# Patient Record
Sex: MALE | Race: WHITE | Employment: OTHER | URBAN - METROPOLITAN AREA
[De-identification: names, ages, dates, MRNs, and addresses within clinical notes are randomized per-mention and may not be internally consistent; named-entity substitution may affect disease eponyms.]

---

## 2017-01-11 ENCOUNTER — ANTI-COAG VISIT (OUTPATIENT)
Dept: FAMILY MEDICINE CLINIC | Age: 77
End: 2017-01-11

## 2017-01-11 DIAGNOSIS — I48.91 ATRIAL FIBRILLATION, UNSPECIFIED TYPE (HCC): ICD-10-CM

## 2017-01-11 LAB
INR BLD: 2
PROTHROMBIN TIME: 22 SEC (ref 8.1–13.7)

## 2017-01-19 ENCOUNTER — OFFICE VISIT (OUTPATIENT)
Dept: CARDIOLOGY | Age: 77
End: 2017-01-19

## 2017-01-19 VITALS
RESPIRATION RATE: 18 BRPM | DIASTOLIC BLOOD PRESSURE: 72 MMHG | HEART RATE: 80 BPM | OXYGEN SATURATION: 94 % | SYSTOLIC BLOOD PRESSURE: 118 MMHG | WEIGHT: 315 LBS | HEIGHT: 69 IN | BODY MASS INDEX: 46.65 KG/M2

## 2017-01-19 DIAGNOSIS — I10 ESSENTIAL HYPERTENSION: ICD-10-CM

## 2017-01-19 DIAGNOSIS — I25.10 CORONARY ARTERY DISEASE INVOLVING NATIVE CORONARY ARTERY OF NATIVE HEART WITHOUT ANGINA PECTORIS: ICD-10-CM

## 2017-01-19 DIAGNOSIS — I48.91 ATRIAL FIBRILLATION, UNSPECIFIED TYPE (HCC): Primary | ICD-10-CM

## 2017-01-19 DIAGNOSIS — E78.2 MIXED HYPERLIPIDEMIA: ICD-10-CM

## 2017-01-19 DIAGNOSIS — G47.33 OSA (OBSTRUCTIVE SLEEP APNEA): Chronic | ICD-10-CM

## 2017-01-19 PROCEDURE — 93000 ELECTROCARDIOGRAM COMPLETE: CPT | Performed by: INTERNAL MEDICINE

## 2017-01-19 PROCEDURE — G8419 CALC BMI OUT NRM PARAM NOF/U: HCPCS | Performed by: INTERNAL MEDICINE

## 2017-01-19 PROCEDURE — 4040F PNEUMOC VAC/ADMIN/RCVD: CPT | Performed by: INTERNAL MEDICINE

## 2017-01-19 PROCEDURE — 1036F TOBACCO NON-USER: CPT | Performed by: INTERNAL MEDICINE

## 2017-01-19 PROCEDURE — 1123F ACP DISCUSS/DSCN MKR DOCD: CPT | Performed by: INTERNAL MEDICINE

## 2017-01-19 PROCEDURE — G8484 FLU IMMUNIZE NO ADMIN: HCPCS | Performed by: INTERNAL MEDICINE

## 2017-01-19 PROCEDURE — 99213 OFFICE O/P EST LOW 20 MIN: CPT | Performed by: INTERNAL MEDICINE

## 2017-01-19 PROCEDURE — G8598 ASA/ANTIPLAT THER USED: HCPCS | Performed by: INTERNAL MEDICINE

## 2017-01-19 PROCEDURE — G8427 DOCREV CUR MEDS BY ELIG CLIN: HCPCS | Performed by: INTERNAL MEDICINE

## 2017-01-19 RX ORDER — BRIMONIDINE TARTRATE 0.15 %
DROPS OPHTHALMIC (EYE)
COMMUNITY
Start: 2017-01-16 | End: 2022-03-22

## 2017-01-19 RX ORDER — POTASSIUM CHLORIDE 750 MG/1
10 TABLET, EXTENDED RELEASE ORAL
COMMUNITY
Start: 2016-11-05 | End: 2017-07-27 | Stop reason: SDUPTHER

## 2017-01-19 RX ORDER — LATANOPROST 50 UG/ML
SOLUTION/ DROPS OPHTHALMIC
COMMUNITY
Start: 2017-01-17

## 2017-02-14 DIAGNOSIS — I48.91 ATRIAL FIBRILLATION, UNSPECIFIED TYPE (HCC): ICD-10-CM

## 2017-02-14 LAB
INR BLD: 2.2
PROTHROMBIN TIME: 24.6 SEC (ref 8.1–13.7)

## 2017-02-15 ENCOUNTER — ANTI-COAG VISIT (OUTPATIENT)
Dept: FAMILY MEDICINE CLINIC | Age: 77
End: 2017-02-15

## 2017-02-17 ENCOUNTER — TELEPHONE (OUTPATIENT)
Dept: FAMILY MEDICINE CLINIC | Age: 77
End: 2017-02-17

## 2017-03-21 ENCOUNTER — ANTI-COAG VISIT (OUTPATIENT)
Dept: FAMILY MEDICINE CLINIC | Age: 77
End: 2017-03-21

## 2017-03-21 DIAGNOSIS — I48.91 ATRIAL FIBRILLATION, UNSPECIFIED TYPE (HCC): ICD-10-CM

## 2017-03-21 LAB
INR BLD: 2.2
PROTHROMBIN TIME: 24.7 SEC (ref 8.1–13.7)

## 2017-05-02 ENCOUNTER — ANTI-COAG VISIT (OUTPATIENT)
Dept: FAMILY MEDICINE CLINIC | Age: 77
End: 2017-05-02

## 2017-05-02 DIAGNOSIS — I48.91 ATRIAL FIBRILLATION, UNSPECIFIED TYPE (HCC): ICD-10-CM

## 2017-05-02 LAB
INR BLD: 2.3
PROTHROMBIN TIME: 24.9 SEC (ref 8.1–13.7)

## 2017-05-18 ENCOUNTER — OFFICE VISIT (OUTPATIENT)
Dept: FAMILY MEDICINE CLINIC | Age: 77
End: 2017-05-18

## 2017-05-18 VITALS
WEIGHT: 302 LBS | BODY MASS INDEX: 44.73 KG/M2 | SYSTOLIC BLOOD PRESSURE: 120 MMHG | RESPIRATION RATE: 18 BRPM | HEART RATE: 72 BPM | DIASTOLIC BLOOD PRESSURE: 58 MMHG | HEIGHT: 69 IN | TEMPERATURE: 97.7 F

## 2017-05-18 DIAGNOSIS — I48.91 ATRIAL FIBRILLATION, UNSPECIFIED TYPE (HCC): ICD-10-CM

## 2017-05-18 DIAGNOSIS — I10 ESSENTIAL HYPERTENSION: Primary | ICD-10-CM

## 2017-05-18 DIAGNOSIS — G47.33 OSA (OBSTRUCTIVE SLEEP APNEA): Chronic | ICD-10-CM

## 2017-05-18 DIAGNOSIS — E78.2 MIXED HYPERLIPIDEMIA: ICD-10-CM

## 2017-05-18 DIAGNOSIS — J44.9 CHRONIC OBSTRUCTIVE PULMONARY DISEASE, UNSPECIFIED COPD TYPE (HCC): ICD-10-CM

## 2017-05-18 DIAGNOSIS — E66.01 MORBID OBESITY WITH BMI OF 40.0-44.9, ADULT (HCC): ICD-10-CM

## 2017-05-18 PROCEDURE — G8926 SPIRO NO PERF OR DOC: HCPCS | Performed by: FAMILY MEDICINE

## 2017-05-18 PROCEDURE — 3023F SPIROM DOC REV: CPT | Performed by: FAMILY MEDICINE

## 2017-05-18 PROCEDURE — 1123F ACP DISCUSS/DSCN MKR DOCD: CPT | Performed by: FAMILY MEDICINE

## 2017-05-18 PROCEDURE — G8427 DOCREV CUR MEDS BY ELIG CLIN: HCPCS | Performed by: FAMILY MEDICINE

## 2017-05-18 PROCEDURE — G8417 CALC BMI ABV UP PARAM F/U: HCPCS | Performed by: FAMILY MEDICINE

## 2017-05-18 PROCEDURE — 1036F TOBACCO NON-USER: CPT | Performed by: FAMILY MEDICINE

## 2017-05-18 PROCEDURE — 4040F PNEUMOC VAC/ADMIN/RCVD: CPT | Performed by: FAMILY MEDICINE

## 2017-05-18 PROCEDURE — G8598 ASA/ANTIPLAT THER USED: HCPCS | Performed by: FAMILY MEDICINE

## 2017-05-18 PROCEDURE — 99214 OFFICE O/P EST MOD 30 MIN: CPT | Performed by: FAMILY MEDICINE

## 2017-05-18 RX ORDER — FLUTICASONE FUROATE AND VILANTEROL 200; 25 UG/1; UG/1
1 POWDER RESPIRATORY (INHALATION) DAILY
Qty: 90 EACH | Refills: 3 | Status: SHIPPED | OUTPATIENT
Start: 2017-05-18 | End: 2018-01-18 | Stop reason: SDUPTHER

## 2017-05-18 ASSESSMENT — ENCOUNTER SYMPTOMS
SHORTNESS OF BREATH: 0
ABDOMINAL PAIN: 0
CONSTIPATION: 0
COUGH: 0
TROUBLE SWALLOWING: 0
VOMITING: 0
DIARRHEA: 0
NAUSEA: 0
BLOOD IN STOOL: 0
CHEST TIGHTNESS: 0

## 2017-06-21 ENCOUNTER — ANTI-COAG VISIT (OUTPATIENT)
Dept: FAMILY MEDICINE CLINIC | Age: 77
End: 2017-06-21

## 2017-06-21 DIAGNOSIS — J44.9 CHRONIC OBSTRUCTIVE PULMONARY DISEASE, UNSPECIFIED COPD TYPE (HCC): ICD-10-CM

## 2017-06-21 DIAGNOSIS — I10 ESSENTIAL HYPERTENSION: ICD-10-CM

## 2017-06-21 DIAGNOSIS — E78.2 MIXED HYPERLIPIDEMIA: ICD-10-CM

## 2017-06-21 LAB
ALBUMIN SERPL-MCNC: 3.8 G/DL (ref 3.9–4.9)
ALP BLD-CCNC: 80 U/L (ref 35–104)
ALT SERPL-CCNC: 17 U/L (ref 0–41)
ANION GAP SERPL CALCULATED.3IONS-SCNC: 12 MEQ/L (ref 7–13)
AST SERPL-CCNC: 19 U/L (ref 0–40)
BASOPHILS ABSOLUTE: 0.1 K/UL (ref 0–0.2)
BASOPHILS RELATIVE PERCENT: 0.8 %
BILIRUB SERPL-MCNC: 0.4 MG/DL (ref 0–1.2)
BUN BLDV-MCNC: 15 MG/DL (ref 8–23)
CALCIUM SERPL-MCNC: 8.2 MG/DL (ref 8.6–10.2)
CHLORIDE BLD-SCNC: 105 MEQ/L (ref 98–107)
CHOLESTEROL, TOTAL: 124 MG/DL (ref 0–199)
CO2: 23 MEQ/L (ref 22–29)
CREAT SERPL-MCNC: 1.05 MG/DL (ref 0.7–1.2)
EOSINOPHILS ABSOLUTE: 0.3 K/UL (ref 0–0.7)
EOSINOPHILS RELATIVE PERCENT: 3.6 %
GFR AFRICAN AMERICAN: >60
GFR NON-AFRICAN AMERICAN: >60
GLOBULIN: 2.1 G/DL (ref 2.3–3.5)
GLUCOSE BLD-MCNC: 134 MG/DL (ref 74–109)
HCT VFR BLD CALC: 40.8 % (ref 42–52)
HDLC SERPL-MCNC: 36 MG/DL (ref 40–59)
HEMOGLOBIN: 14.1 G/DL (ref 14–18)
INR BLD: 1.4
LDL CHOLESTEROL CALCULATED: 70 MG/DL (ref 0–129)
LYMPHOCYTES ABSOLUTE: 1.4 K/UL (ref 1–4.8)
LYMPHOCYTES RELATIVE PERCENT: 20.4 %
MCH RBC QN AUTO: 33.4 PG (ref 27–31.3)
MCHC RBC AUTO-ENTMCNC: 34.6 % (ref 33–37)
MCV RBC AUTO: 96.5 FL (ref 80–100)
MONOCYTES ABSOLUTE: 0.5 K/UL (ref 0.2–0.8)
MONOCYTES RELATIVE PERCENT: 7.4 %
NEUTROPHILS ABSOLUTE: 4.8 K/UL (ref 1.4–6.5)
NEUTROPHILS RELATIVE PERCENT: 67.8 %
PDW BLD-RTO: 14.2 % (ref 11.5–14.5)
PLATELET # BLD: 176 K/UL (ref 130–400)
POTASSIUM SERPL-SCNC: 3.9 MEQ/L (ref 3.5–5.1)
PROTHROMBIN TIME: 15.5 SEC (ref 8.1–13.7)
RBC # BLD: 4.23 M/UL (ref 4.7–6.1)
SODIUM BLD-SCNC: 140 MEQ/L (ref 132–144)
TOTAL PROTEIN: 5.9 G/DL (ref 6.4–8.1)
TRIGL SERPL-MCNC: 91 MG/DL (ref 0–200)
WBC # BLD: 7.1 K/UL (ref 4.8–10.8)

## 2017-07-03 ENCOUNTER — ANTI-COAG VISIT (OUTPATIENT)
Dept: FAMILY MEDICINE CLINIC | Age: 77
End: 2017-07-03

## 2017-07-03 DIAGNOSIS — I48.91 ATRIAL FIBRILLATION, UNSPECIFIED TYPE (HCC): ICD-10-CM

## 2017-07-03 DIAGNOSIS — I48.91 ATRIAL FIBRILLATION, UNSPECIFIED TYPE (HCC): Primary | ICD-10-CM

## 2017-07-03 LAB
INR BLD: 2
PROTHROMBIN TIME: 22.1 SEC (ref 8.1–13.7)

## 2017-07-12 ENCOUNTER — OFFICE VISIT (OUTPATIENT)
Dept: FAMILY MEDICINE CLINIC | Age: 77
End: 2017-07-12

## 2017-07-12 VITALS
DIASTOLIC BLOOD PRESSURE: 62 MMHG | WEIGHT: 306 LBS | HEIGHT: 69 IN | HEART RATE: 66 BPM | SYSTOLIC BLOOD PRESSURE: 122 MMHG | TEMPERATURE: 97.4 F | RESPIRATION RATE: 24 BRPM | BODY MASS INDEX: 45.32 KG/M2

## 2017-07-12 DIAGNOSIS — I10 ESSENTIAL HYPERTENSION: Primary | ICD-10-CM

## 2017-07-12 DIAGNOSIS — I48.91 ATRIAL FIBRILLATION, UNSPECIFIED TYPE (HCC): ICD-10-CM

## 2017-07-12 DIAGNOSIS — R79.9 ABNORMAL FINDING OF BLOOD CHEMISTRY: ICD-10-CM

## 2017-07-12 DIAGNOSIS — G47.33 OSA (OBSTRUCTIVE SLEEP APNEA): Chronic | ICD-10-CM

## 2017-07-12 DIAGNOSIS — E66.01 MORBID OBESITY WITH BMI OF 40.0-44.9, ADULT (HCC): ICD-10-CM

## 2017-07-12 DIAGNOSIS — J44.9 CHRONIC OBSTRUCTIVE PULMONARY DISEASE, UNSPECIFIED COPD TYPE (HCC): ICD-10-CM

## 2017-07-12 DIAGNOSIS — E78.2 MIXED HYPERLIPIDEMIA: ICD-10-CM

## 2017-07-12 PROCEDURE — 1123F ACP DISCUSS/DSCN MKR DOCD: CPT | Performed by: FAMILY MEDICINE

## 2017-07-12 PROCEDURE — 1036F TOBACCO NON-USER: CPT | Performed by: FAMILY MEDICINE

## 2017-07-12 PROCEDURE — 99214 OFFICE O/P EST MOD 30 MIN: CPT | Performed by: FAMILY MEDICINE

## 2017-07-12 PROCEDURE — G8598 ASA/ANTIPLAT THER USED: HCPCS | Performed by: FAMILY MEDICINE

## 2017-07-12 PROCEDURE — G8926 SPIRO NO PERF OR DOC: HCPCS | Performed by: FAMILY MEDICINE

## 2017-07-12 PROCEDURE — G8427 DOCREV CUR MEDS BY ELIG CLIN: HCPCS | Performed by: FAMILY MEDICINE

## 2017-07-12 PROCEDURE — 3023F SPIROM DOC REV: CPT | Performed by: FAMILY MEDICINE

## 2017-07-12 PROCEDURE — G8417 CALC BMI ABV UP PARAM F/U: HCPCS | Performed by: FAMILY MEDICINE

## 2017-07-12 PROCEDURE — 4040F PNEUMOC VAC/ADMIN/RCVD: CPT | Performed by: FAMILY MEDICINE

## 2017-07-20 ENCOUNTER — OFFICE VISIT (OUTPATIENT)
Dept: CARDIOLOGY | Age: 77
End: 2017-07-20

## 2017-07-20 VITALS
WEIGHT: 314.2 LBS | DIASTOLIC BLOOD PRESSURE: 64 MMHG | HEART RATE: 66 BPM | OXYGEN SATURATION: 97 % | RESPIRATION RATE: 16 BRPM | BODY MASS INDEX: 46.06 KG/M2 | SYSTOLIC BLOOD PRESSURE: 136 MMHG

## 2017-07-20 DIAGNOSIS — I48.91 ATRIAL FIBRILLATION, UNSPECIFIED TYPE (HCC): Primary | ICD-10-CM

## 2017-07-20 DIAGNOSIS — I10 ESSENTIAL HYPERTENSION: ICD-10-CM

## 2017-07-20 DIAGNOSIS — E66.01 MORBID OBESITY WITH BMI OF 40.0-44.9, ADULT (HCC): ICD-10-CM

## 2017-07-20 DIAGNOSIS — G47.33 OSA (OBSTRUCTIVE SLEEP APNEA): Chronic | ICD-10-CM

## 2017-07-20 PROCEDURE — G8417 CALC BMI ABV UP PARAM F/U: HCPCS | Performed by: INTERNAL MEDICINE

## 2017-07-20 PROCEDURE — G8427 DOCREV CUR MEDS BY ELIG CLIN: HCPCS | Performed by: INTERNAL MEDICINE

## 2017-07-20 PROCEDURE — 93000 ELECTROCARDIOGRAM COMPLETE: CPT | Performed by: INTERNAL MEDICINE

## 2017-07-20 PROCEDURE — 1036F TOBACCO NON-USER: CPT | Performed by: INTERNAL MEDICINE

## 2017-07-20 PROCEDURE — 99213 OFFICE O/P EST LOW 20 MIN: CPT | Performed by: INTERNAL MEDICINE

## 2017-07-20 PROCEDURE — 4040F PNEUMOC VAC/ADMIN/RCVD: CPT | Performed by: INTERNAL MEDICINE

## 2017-07-20 PROCEDURE — G8598 ASA/ANTIPLAT THER USED: HCPCS | Performed by: INTERNAL MEDICINE

## 2017-07-20 PROCEDURE — 1123F ACP DISCUSS/DSCN MKR DOCD: CPT | Performed by: INTERNAL MEDICINE

## 2017-07-24 ENCOUNTER — ANTI-COAG VISIT (OUTPATIENT)
Dept: FAMILY MEDICINE CLINIC | Age: 77
End: 2017-07-24

## 2017-07-24 DIAGNOSIS — I48.91 ATRIAL FIBRILLATION, UNSPECIFIED TYPE (HCC): ICD-10-CM

## 2017-07-24 LAB
INR BLD: 1.9
PROTHROMBIN TIME: 21 SEC (ref 8.1–13.7)

## 2017-08-30 ENCOUNTER — ANTI-COAG VISIT (OUTPATIENT)
Dept: FAMILY MEDICINE CLINIC | Age: 77
End: 2017-08-30

## 2017-08-30 DIAGNOSIS — I48.91 ATRIAL FIBRILLATION, UNSPECIFIED TYPE (HCC): ICD-10-CM

## 2017-08-30 LAB
INR BLD: 2.6
PROTHROMBIN TIME: 27.9 SEC (ref 8.1–13.7)

## 2017-10-12 DIAGNOSIS — I48.91 ATRIAL FIBRILLATION, UNSPECIFIED TYPE (HCC): ICD-10-CM

## 2017-10-12 LAB
INR BLD: 2.4
PROTHROMBIN TIME: 25 SEC (ref 8.1–13.7)

## 2017-10-13 ENCOUNTER — ANTI-COAG VISIT (OUTPATIENT)
Dept: FAMILY MEDICINE CLINIC | Age: 77
End: 2017-10-13

## 2017-10-26 ENCOUNTER — OFFICE VISIT (OUTPATIENT)
Dept: FAMILY MEDICINE CLINIC | Age: 77
End: 2017-10-26

## 2017-10-26 VITALS
HEART RATE: 84 BPM | WEIGHT: 313 LBS | BODY MASS INDEX: 45.89 KG/M2 | DIASTOLIC BLOOD PRESSURE: 70 MMHG | RESPIRATION RATE: 24 BRPM | SYSTOLIC BLOOD PRESSURE: 126 MMHG | TEMPERATURE: 97.7 F

## 2017-10-26 DIAGNOSIS — I10 ESSENTIAL HYPERTENSION: ICD-10-CM

## 2017-10-26 DIAGNOSIS — S81.802A WOUND OF LEFT LOWER EXTREMITY, INITIAL ENCOUNTER: Primary | ICD-10-CM

## 2017-10-26 DIAGNOSIS — Z23 NEED FOR IMMUNIZATION AGAINST INFLUENZA: ICD-10-CM

## 2017-10-26 PROCEDURE — 1123F ACP DISCUSS/DSCN MKR DOCD: CPT | Performed by: FAMILY MEDICINE

## 2017-10-26 PROCEDURE — 4040F PNEUMOC VAC/ADMIN/RCVD: CPT | Performed by: FAMILY MEDICINE

## 2017-10-26 PROCEDURE — 99213 OFFICE O/P EST LOW 20 MIN: CPT | Performed by: FAMILY MEDICINE

## 2017-10-26 PROCEDURE — G8417 CALC BMI ABV UP PARAM F/U: HCPCS | Performed by: FAMILY MEDICINE

## 2017-10-26 PROCEDURE — G8598 ASA/ANTIPLAT THER USED: HCPCS | Performed by: FAMILY MEDICINE

## 2017-10-26 PROCEDURE — G8484 FLU IMMUNIZE NO ADMIN: HCPCS | Performed by: FAMILY MEDICINE

## 2017-10-26 PROCEDURE — 90662 IIV NO PRSV INCREASED AG IM: CPT | Performed by: FAMILY MEDICINE

## 2017-10-26 PROCEDURE — G0008 ADMIN INFLUENZA VIRUS VAC: HCPCS | Performed by: FAMILY MEDICINE

## 2017-10-26 PROCEDURE — G8427 DOCREV CUR MEDS BY ELIG CLIN: HCPCS | Performed by: FAMILY MEDICINE

## 2017-10-26 PROCEDURE — 1036F TOBACCO NON-USER: CPT | Performed by: FAMILY MEDICINE

## 2017-10-26 RX ORDER — TERAZOSIN 10 MG/1
10 CAPSULE ORAL NIGHTLY
Qty: 90 CAPSULE | Refills: 3 | Status: SHIPPED | OUTPATIENT
Start: 2017-10-26 | End: 2018-07-22 | Stop reason: SDUPTHER

## 2017-11-09 ENCOUNTER — OFFICE VISIT (OUTPATIENT)
Dept: FAMILY MEDICINE CLINIC | Age: 77
End: 2017-11-09

## 2017-11-09 VITALS
HEIGHT: 69 IN | DIASTOLIC BLOOD PRESSURE: 80 MMHG | RESPIRATION RATE: 16 BRPM | BODY MASS INDEX: 45.85 KG/M2 | WEIGHT: 309.6 LBS | SYSTOLIC BLOOD PRESSURE: 132 MMHG | HEART RATE: 75 BPM

## 2017-11-09 DIAGNOSIS — Z23 NEED FOR DIPHTHERIA-TETANUS-PERTUSSIS (TDAP) VACCINE, ADULT/ADOLESCENT: ICD-10-CM

## 2017-11-09 DIAGNOSIS — L97.921 SKIN ULCER OF LEFT LOWER LEG, LIMITED TO BREAKDOWN OF SKIN (HCC): Primary | ICD-10-CM

## 2017-11-09 PROCEDURE — G8484 FLU IMMUNIZE NO ADMIN: HCPCS | Performed by: FAMILY MEDICINE

## 2017-11-09 PROCEDURE — 1123F ACP DISCUSS/DSCN MKR DOCD: CPT | Performed by: FAMILY MEDICINE

## 2017-11-09 PROCEDURE — G8598 ASA/ANTIPLAT THER USED: HCPCS | Performed by: FAMILY MEDICINE

## 2017-11-09 PROCEDURE — G8417 CALC BMI ABV UP PARAM F/U: HCPCS | Performed by: FAMILY MEDICINE

## 2017-11-09 PROCEDURE — 99213 OFFICE O/P EST LOW 20 MIN: CPT | Performed by: FAMILY MEDICINE

## 2017-11-09 PROCEDURE — G8427 DOCREV CUR MEDS BY ELIG CLIN: HCPCS | Performed by: FAMILY MEDICINE

## 2017-11-09 PROCEDURE — 1036F TOBACCO NON-USER: CPT | Performed by: FAMILY MEDICINE

## 2017-11-09 PROCEDURE — 4040F PNEUMOC VAC/ADMIN/RCVD: CPT | Performed by: FAMILY MEDICINE

## 2017-11-09 ASSESSMENT — PATIENT HEALTH QUESTIONNAIRE - PHQ9
2. FEELING DOWN, DEPRESSED OR HOPELESS: 0
1. LITTLE INTEREST OR PLEASURE IN DOING THINGS: 0
SUM OF ALL RESPONSES TO PHQ QUESTIONS 1-9: 0
SUM OF ALL RESPONSES TO PHQ9 QUESTIONS 1 & 2: 0

## 2017-11-09 NOTE — PROGRESS NOTES
Chief Complaint   Patient presents with    Wound Check     2 week fo left low leg       HPI: Juanjose Aguayo is a 65 y.o. male presenting for 2 week follow-up of wound on left lower leg. It is about the same as it was 2 weeks ago and is tender enough to interfere with sleep.   this patient is due for his tetanus, diphtheria, acellular pertussis booster. EXAM:  Constitutional Blood pressure 132/80, pulse 75, resp. rate 16, height 5' 9\" (1.753 m), weight (!) 309 lb 9.6 oz (140.4 kg). .   Physical Exam   Constitutional: He is oriented to person, place, and time. He appears well-developed and well-nourished. Cardiovascular: Normal rate, regular rhythm and normal heart sounds. Pulmonary/Chest: Effort normal and breath sounds normal.   Abdominal: Rebound: This patient is due for his tetanus shot. Neurological: He is oriented to person, place, and time. Skin:   Open sore approximately 2 cm x 1 and into the fatty layer on the lateral left shin. This is surrounded by some erythematous skin although not indurated. DIAGNOSIS:   1. Skin ulcer of left lower leg, limited to breakdown of skin ([address]  Amb External Referral To Wound Clinic   2. Need for diphtheria-tetanus-pertussis (Tdap) vaccine, adult/adolescent  Tdap (ADACEL) 5-2-15.5 LF-MCG/0.5 injection     Plan for follow up: Follow up in scheduled time with blood work as ordered. Other follow up as needed.       Electronically signed by Tonja Queen, 9:38 PM 11/12/17

## 2017-11-14 DIAGNOSIS — I10 ESSENTIAL HYPERTENSION: ICD-10-CM

## 2017-11-14 DIAGNOSIS — E78.2 MIXED HYPERLIPIDEMIA: Primary | ICD-10-CM

## 2017-11-14 DIAGNOSIS — R79.9 ABNORMAL FINDING OF BLOOD CHEMISTRY: ICD-10-CM

## 2017-11-14 DIAGNOSIS — E78.2 MIXED HYPERLIPIDEMIA: ICD-10-CM

## 2017-11-14 DIAGNOSIS — I48.91 ATRIAL FIBRILLATION, UNSPECIFIED TYPE (HCC): ICD-10-CM

## 2017-11-14 LAB
ALBUMIN SERPL-MCNC: 4 G/DL (ref 3.9–4.9)
ALP BLD-CCNC: 77 U/L (ref 35–104)
ALT SERPL-CCNC: 22 U/L (ref 0–41)
ANION GAP SERPL CALCULATED.3IONS-SCNC: 18 MEQ/L (ref 7–13)
AST SERPL-CCNC: 18 U/L (ref 0–40)
BILIRUB SERPL-MCNC: 0.6 MG/DL (ref 0–1.2)
BUN BLDV-MCNC: 22 MG/DL (ref 8–23)
CALCIUM SERPL-MCNC: 9.2 MG/DL (ref 8.6–10.2)
CHLORIDE BLD-SCNC: 101 MEQ/L (ref 98–107)
CHOLESTEROL, TOTAL: 134 MG/DL (ref 0–199)
CO2: 23 MEQ/L (ref 22–29)
CREAT SERPL-MCNC: 1.09 MG/DL (ref 0.7–1.2)
GFR AFRICAN AMERICAN: >60
GFR NON-AFRICAN AMERICAN: >60
GLOBULIN: 2.5 G/DL (ref 2.3–3.5)
GLUCOSE BLD-MCNC: 111 MG/DL (ref 74–109)
HBA1C MFR BLD: 6.5 % (ref 4.8–5.9)
HDLC SERPL-MCNC: 35 MG/DL (ref 40–59)
INR BLD: 2.5
LDL CHOLESTEROL CALCULATED: 73 MG/DL (ref 0–129)
POTASSIUM SERPL-SCNC: 4.2 MEQ/L (ref 3.5–5.1)
PROTHROMBIN TIME: 26.4 SEC (ref 8.1–13.7)
SODIUM BLD-SCNC: 142 MEQ/L (ref 132–144)
TOTAL PROTEIN: 6.5 G/DL (ref 6.4–8.1)
TRIGL SERPL-MCNC: 128 MG/DL (ref 0–200)

## 2017-11-15 ENCOUNTER — ANTI-COAG VISIT (OUTPATIENT)
Dept: FAMILY MEDICINE CLINIC | Age: 77
End: 2017-11-15

## 2017-11-20 ENCOUNTER — OFFICE VISIT (OUTPATIENT)
Dept: FAMILY MEDICINE CLINIC | Age: 77
End: 2017-11-20

## 2017-11-20 VITALS
WEIGHT: 312 LBS | DIASTOLIC BLOOD PRESSURE: 78 MMHG | RESPIRATION RATE: 24 BRPM | SYSTOLIC BLOOD PRESSURE: 150 MMHG | HEART RATE: 84 BPM | TEMPERATURE: 98.4 F | BODY MASS INDEX: 46.07 KG/M2

## 2017-11-20 DIAGNOSIS — J44.9 CHRONIC OBSTRUCTIVE PULMONARY DISEASE, UNSPECIFIED COPD TYPE (HCC): ICD-10-CM

## 2017-11-20 DIAGNOSIS — I10 ESSENTIAL HYPERTENSION: ICD-10-CM

## 2017-11-20 DIAGNOSIS — L97.921 SKIN ULCER OF LEFT LOWER LEG, LIMITED TO BREAKDOWN OF SKIN (HCC): ICD-10-CM

## 2017-11-20 DIAGNOSIS — E11.9 TYPE 2 DIABETES MELLITUS WITHOUT COMPLICATION, WITHOUT LONG-TERM CURRENT USE OF INSULIN (HCC): Primary | ICD-10-CM

## 2017-11-20 DIAGNOSIS — I48.91 ATRIAL FIBRILLATION, UNSPECIFIED TYPE (HCC): ICD-10-CM

## 2017-11-20 DIAGNOSIS — E78.2 MIXED HYPERLIPIDEMIA: ICD-10-CM

## 2017-11-20 PROCEDURE — G8417 CALC BMI ABV UP PARAM F/U: HCPCS | Performed by: FAMILY MEDICINE

## 2017-11-20 PROCEDURE — 4040F PNEUMOC VAC/ADMIN/RCVD: CPT | Performed by: FAMILY MEDICINE

## 2017-11-20 PROCEDURE — G8427 DOCREV CUR MEDS BY ELIG CLIN: HCPCS | Performed by: FAMILY MEDICINE

## 2017-11-20 PROCEDURE — 1123F ACP DISCUSS/DSCN MKR DOCD: CPT | Performed by: FAMILY MEDICINE

## 2017-11-20 PROCEDURE — 3023F SPIROM DOC REV: CPT | Performed by: FAMILY MEDICINE

## 2017-11-20 PROCEDURE — G8926 SPIRO NO PERF OR DOC: HCPCS | Performed by: FAMILY MEDICINE

## 2017-11-20 PROCEDURE — G8598 ASA/ANTIPLAT THER USED: HCPCS | Performed by: FAMILY MEDICINE

## 2017-11-20 PROCEDURE — G8484 FLU IMMUNIZE NO ADMIN: HCPCS | Performed by: FAMILY MEDICINE

## 2017-11-20 PROCEDURE — 1036F TOBACCO NON-USER: CPT | Performed by: FAMILY MEDICINE

## 2017-11-20 PROCEDURE — 99214 OFFICE O/P EST MOD 30 MIN: CPT | Performed by: FAMILY MEDICINE

## 2017-11-20 NOTE — PROGRESS NOTES
Dyslipidemia and Hypertension: Jac Rodriguez presents for evaluation of lipids. Compliance with treatment thus far has been good. The patient exercises three times a week. Patient here for follow-up of elevated blood pressure. He is adherent to low salt diet. Blood pressure is well controlled at home Antihypertensive medication side effects: no medication side effects noted. Use of agents associated with hypertension: none. No new myalgias or GI upset on simvastatin (Zocor). The patient is diabetic and his hemoglobin A1c is 6.5%. This is well-controlled with current diet. At this point we do not need to add medication. Skin ulcer in the left lower leg is still present, it is shallow and it seems to be healing but not rapidly. His COPD is stable with current treatment. Lab results for chronic conditions:    GFR Non- (no units)   Date Value   11/14/2017 >60.0   06/21/2017 >60.0   11/08/2016 >60.0     GFR  (no units)   Date Value   11/14/2017 >60.0   06/21/2017 >60.0   11/08/2016 >60.0     Cholesterol, Total (mg/dL)   Date Value   11/14/2017 134   06/21/2017 124   11/08/2016 147     Triglycerides (mg/dL)   Date Value   11/14/2017 128   06/21/2017 91   11/08/2016 142     HDL (mg/dL)   Date Value   11/14/2017 35 (L)   06/21/2017 36 (L)   11/08/2016 37 (L)     LDL Calculated (mg/dL)   Date Value   11/14/2017 73   06/21/2017 70   11/08/2016 82          Past Medical History:   Diagnosis Date    Atrial fibrillation (HCC)     BPH (benign prostatic hypertrophy)     CAD (coronary artery disease)     COPD (chronic obstructive pulmonary disease) (HCC)     Hyperlipidemia     Hypertension     Insomnia     Sleep apnea     CPAP     ROS:This patient reports no polyuria, polydipsia or episodes of hypoglycemia. Medications are tolerated well with good compliance reported. This patient reports no chest pains or pressure. There is no shortness of breath or chest wall soreness.  The patient COPD type ([address]  CBC Auto Differential    Well controlled, continue current medication. if the skin wound is not healed over the next couple weeks patient is to call back and he would then need referral to wound center. Plan for follow up: Follow up in 6 months with blood work as ordered. Other follow up as needed.       Electronically signed by Chapin Yuan, 9:18 PM 11/25/17

## 2017-11-25 RX ORDER — SIMVASTATIN 20 MG
20 TABLET ORAL NIGHTLY
Qty: 90 TABLET | Refills: 3 | Status: SHIPPED | OUTPATIENT
Start: 2017-11-25 | End: 2018-07-22 | Stop reason: SDUPTHER

## 2018-01-18 ENCOUNTER — OFFICE VISIT (OUTPATIENT)
Dept: CARDIOLOGY | Age: 78
End: 2018-01-18

## 2018-01-18 ENCOUNTER — TELEPHONE (OUTPATIENT)
Dept: FAMILY MEDICINE CLINIC | Age: 78
End: 2018-01-18

## 2018-01-18 VITALS
BODY MASS INDEX: 46.65 KG/M2 | HEART RATE: 66 BPM | DIASTOLIC BLOOD PRESSURE: 80 MMHG | SYSTOLIC BLOOD PRESSURE: 130 MMHG | WEIGHT: 315 LBS | HEIGHT: 69 IN

## 2018-01-18 DIAGNOSIS — I10 ESSENTIAL HYPERTENSION: ICD-10-CM

## 2018-01-18 DIAGNOSIS — E13.8 DIABETES MELLITUS OF OTHER TYPE WITH COMPLICATION, UNSPECIFIED LONG TERM INSULIN USE STATUS: ICD-10-CM

## 2018-01-18 DIAGNOSIS — I87.2 VENOUS INSUFFICIENCY: ICD-10-CM

## 2018-01-18 DIAGNOSIS — I48.91 ATRIAL FIBRILLATION, UNSPECIFIED TYPE (HCC): Primary | ICD-10-CM

## 2018-01-18 DIAGNOSIS — L98.491 CHRONIC SKIN ULCER, LIMITED TO BREAKDOWN OF SKIN (HCC): ICD-10-CM

## 2018-01-18 DIAGNOSIS — I48.20 CHRONIC ATRIAL FIBRILLATION (HCC): ICD-10-CM

## 2018-01-18 DIAGNOSIS — E66.01 MORBID OBESITY WITH BMI OF 45.0-49.9, ADULT (HCC): ICD-10-CM

## 2018-01-18 DIAGNOSIS — J44.9 CHRONIC OBSTRUCTIVE PULMONARY DISEASE, UNSPECIFIED COPD TYPE (HCC): ICD-10-CM

## 2018-01-18 DIAGNOSIS — R60.0 BILATERAL LEG EDEMA: ICD-10-CM

## 2018-01-18 DIAGNOSIS — R60.9 PERIPHERAL EDEMA: ICD-10-CM

## 2018-01-18 PROCEDURE — 93000 ELECTROCARDIOGRAM COMPLETE: CPT | Performed by: INTERNAL MEDICINE

## 2018-01-18 PROCEDURE — 4040F PNEUMOC VAC/ADMIN/RCVD: CPT | Performed by: INTERNAL MEDICINE

## 2018-01-18 PROCEDURE — G8417 CALC BMI ABV UP PARAM F/U: HCPCS | Performed by: INTERNAL MEDICINE

## 2018-01-18 PROCEDURE — G8427 DOCREV CUR MEDS BY ELIG CLIN: HCPCS | Performed by: INTERNAL MEDICINE

## 2018-01-18 PROCEDURE — G8484 FLU IMMUNIZE NO ADMIN: HCPCS | Performed by: INTERNAL MEDICINE

## 2018-01-18 PROCEDURE — 1123F ACP DISCUSS/DSCN MKR DOCD: CPT | Performed by: INTERNAL MEDICINE

## 2018-01-18 PROCEDURE — 1036F TOBACCO NON-USER: CPT | Performed by: INTERNAL MEDICINE

## 2018-01-18 PROCEDURE — 99213 OFFICE O/P EST LOW 20 MIN: CPT | Performed by: INTERNAL MEDICINE

## 2018-01-18 PROCEDURE — G8598 ASA/ANTIPLAT THER USED: HCPCS | Performed by: INTERNAL MEDICINE

## 2018-01-18 RX ORDER — LISINOPRIL 20 MG/1
TABLET ORAL
Qty: 90 TABLET | Refills: 3 | Status: SHIPPED | OUTPATIENT
Start: 2018-01-18 | End: 2018-10-29 | Stop reason: SDUPTHER

## 2018-01-18 RX ORDER — FUROSEMIDE 40 MG/1
TABLET ORAL
Qty: 180 TABLET | Refills: 3 | Status: SHIPPED | OUTPATIENT
Start: 2018-01-18 | End: 2018-10-29 | Stop reason: SDUPTHER

## 2018-01-18 RX ORDER — FINASTERIDE 5 MG/1
TABLET, FILM COATED ORAL
Qty: 90 TABLET | Refills: 3 | Status: SHIPPED | OUTPATIENT
Start: 2018-01-18 | End: 2018-10-29 | Stop reason: SDUPTHER

## 2018-01-18 RX ORDER — POTASSIUM CHLORIDE 750 MG/1
TABLET, EXTENDED RELEASE ORAL
Qty: 90 TABLET | Refills: 3 | Status: SHIPPED | OUTPATIENT
Start: 2018-01-18 | End: 2018-10-29 | Stop reason: SDUPTHER

## 2018-01-20 RX ORDER — FLUTICASONE FUROATE AND VILANTEROL 200; 25 UG/1; UG/1
1 POWDER RESPIRATORY (INHALATION) DAILY
Qty: 90 EACH | Refills: 3 | Status: SHIPPED | OUTPATIENT
Start: 2018-01-20 | End: 2018-10-29

## 2018-01-25 ENCOUNTER — ANTI-COAG VISIT (OUTPATIENT)
Dept: FAMILY MEDICINE CLINIC | Age: 78
End: 2018-01-25

## 2018-01-25 DIAGNOSIS — I48.91 ATRIAL FIBRILLATION, UNSPECIFIED TYPE (HCC): ICD-10-CM

## 2018-01-25 LAB
INR BLD: 2.7
PROTHROMBIN TIME: 29 SEC (ref 8.1–13.7)

## 2018-02-28 ENCOUNTER — ANTI-COAG VISIT (OUTPATIENT)
Dept: FAMILY MEDICINE CLINIC | Age: 78
End: 2018-02-28

## 2018-02-28 DIAGNOSIS — I48.91 ATRIAL FIBRILLATION, UNSPECIFIED TYPE (HCC): ICD-10-CM

## 2018-02-28 LAB
INR BLD: 3
PROTHROMBIN TIME: 31.4 SEC (ref 8.1–13.7)

## 2018-04-02 ENCOUNTER — ANTI-COAG VISIT (OUTPATIENT)
Dept: FAMILY MEDICINE CLINIC | Age: 78
End: 2018-04-02

## 2018-04-02 ENCOUNTER — TELEPHONE (OUTPATIENT)
Dept: FAMILY MEDICINE CLINIC | Age: 78
End: 2018-04-02

## 2018-04-02 DIAGNOSIS — I48.91 ATRIAL FIBRILLATION, UNSPECIFIED TYPE (HCC): ICD-10-CM

## 2018-04-02 LAB
INR BLD: 2.4
PROTHROMBIN TIME: 25.8 SEC (ref 8.1–13.7)

## 2018-04-02 RX ORDER — WARFARIN SODIUM 5 MG/1
TABLET ORAL
Qty: 135 TABLET | Refills: 3 | Status: SHIPPED | OUTPATIENT
Start: 2018-04-02 | End: 2018-10-29 | Stop reason: SDUPTHER

## 2018-05-14 ENCOUNTER — ANTI-COAG VISIT (OUTPATIENT)
Dept: FAMILY MEDICINE CLINIC | Age: 78
End: 2018-05-14

## 2018-05-14 DIAGNOSIS — I48.91 ATRIAL FIBRILLATION, UNSPECIFIED TYPE (HCC): ICD-10-CM

## 2018-05-14 DIAGNOSIS — E78.2 MIXED HYPERLIPIDEMIA: ICD-10-CM

## 2018-05-14 DIAGNOSIS — J44.9 CHRONIC OBSTRUCTIVE PULMONARY DISEASE, UNSPECIFIED COPD TYPE (HCC): ICD-10-CM

## 2018-05-14 DIAGNOSIS — E11.9 TYPE 2 DIABETES MELLITUS WITHOUT COMPLICATION, WITHOUT LONG-TERM CURRENT USE OF INSULIN (HCC): ICD-10-CM

## 2018-05-14 DIAGNOSIS — I10 ESSENTIAL HYPERTENSION: ICD-10-CM

## 2018-05-14 LAB
ALBUMIN SERPL-MCNC: 4.1 G/DL (ref 3.9–4.9)
ALP BLD-CCNC: 101 U/L (ref 35–104)
ALT SERPL-CCNC: 25 U/L (ref 0–41)
ANION GAP SERPL CALCULATED.3IONS-SCNC: 19 MEQ/L (ref 7–13)
AST SERPL-CCNC: 21 U/L (ref 0–40)
BASOPHILS ABSOLUTE: 0 K/UL (ref 0–0.2)
BASOPHILS RELATIVE PERCENT: 0.6 %
BILIRUB SERPL-MCNC: 0.4 MG/DL (ref 0–1.2)
BUN BLDV-MCNC: 19 MG/DL (ref 8–23)
CALCIUM SERPL-MCNC: 8.5 MG/DL (ref 8.6–10.2)
CHLORIDE BLD-SCNC: 101 MEQ/L (ref 98–107)
CHOLESTEROL, TOTAL: 139 MG/DL (ref 0–199)
CO2: 23 MEQ/L (ref 22–29)
CREAT SERPL-MCNC: 1.07 MG/DL (ref 0.7–1.2)
EOSINOPHILS ABSOLUTE: 0.2 K/UL (ref 0–0.7)
EOSINOPHILS RELATIVE PERCENT: 2.8 %
GFR AFRICAN AMERICAN: >60
GFR NON-AFRICAN AMERICAN: >60
GLOBULIN: 2.2 G/DL (ref 2.3–3.5)
GLUCOSE BLD-MCNC: 140 MG/DL (ref 74–109)
HBA1C MFR BLD: 6.5 % (ref 4.8–5.9)
HCT VFR BLD CALC: 41.9 % (ref 42–52)
HDLC SERPL-MCNC: 34 MG/DL (ref 40–59)
HEMOGLOBIN: 14.5 G/DL (ref 14–18)
INR BLD: 2.7
LDL CHOLESTEROL CALCULATED: 80 MG/DL (ref 0–129)
LYMPHOCYTES ABSOLUTE: 1.2 K/UL (ref 1–4.8)
LYMPHOCYTES RELATIVE PERCENT: 15.7 %
MCH RBC QN AUTO: 34 PG (ref 27–31.3)
MCHC RBC AUTO-ENTMCNC: 34.7 % (ref 33–37)
MCV RBC AUTO: 97.9 FL (ref 80–100)
MONOCYTES ABSOLUTE: 0.5 K/UL (ref 0.2–0.8)
MONOCYTES RELATIVE PERCENT: 6.4 %
NEUTROPHILS ABSOLUTE: 5.6 K/UL (ref 1.4–6.5)
NEUTROPHILS RELATIVE PERCENT: 74.5 %
PDW BLD-RTO: 14.9 % (ref 11.5–14.5)
PLATELET # BLD: 196 K/UL (ref 130–400)
POTASSIUM SERPL-SCNC: 4.3 MEQ/L (ref 3.5–5.1)
PROTHROMBIN TIME: 28.8 SEC (ref 9.6–12.3)
RBC # BLD: 4.28 M/UL (ref 4.7–6.1)
SODIUM BLD-SCNC: 143 MEQ/L (ref 132–144)
TOTAL PROTEIN: 6.3 G/DL (ref 6.4–8.1)
TRIGL SERPL-MCNC: 125 MG/DL (ref 0–200)
WBC # BLD: 7.6 K/UL (ref 4.8–10.8)

## 2018-05-21 ENCOUNTER — OFFICE VISIT (OUTPATIENT)
Dept: FAMILY MEDICINE CLINIC | Age: 78
End: 2018-05-21
Payer: MEDICARE

## 2018-05-21 VITALS
BODY MASS INDEX: 46.65 KG/M2 | SYSTOLIC BLOOD PRESSURE: 132 MMHG | HEART RATE: 72 BPM | DIASTOLIC BLOOD PRESSURE: 76 MMHG | RESPIRATION RATE: 16 BRPM | OXYGEN SATURATION: 94 % | HEIGHT: 69 IN | WEIGHT: 315 LBS | TEMPERATURE: 97.7 F

## 2018-05-21 DIAGNOSIS — I10 ESSENTIAL HYPERTENSION: ICD-10-CM

## 2018-05-21 DIAGNOSIS — E78.2 MIXED HYPERLIPIDEMIA: ICD-10-CM

## 2018-05-21 DIAGNOSIS — G47.33 OSA (OBSTRUCTIVE SLEEP APNEA): Chronic | ICD-10-CM

## 2018-05-21 DIAGNOSIS — E11.9 TYPE 2 DIABETES MELLITUS WITHOUT COMPLICATION, WITHOUT LONG-TERM CURRENT USE OF INSULIN (HCC): ICD-10-CM

## 2018-05-21 DIAGNOSIS — H40.9 GLAUCOMA OF BOTH EYES, UNSPECIFIED GLAUCOMA TYPE: ICD-10-CM

## 2018-05-21 DIAGNOSIS — E11.9 TYPE 2 DIABETES MELLITUS WITHOUT COMPLICATION, WITHOUT LONG-TERM CURRENT USE OF INSULIN (HCC): Primary | ICD-10-CM

## 2018-05-21 DIAGNOSIS — I48.91 ATRIAL FIBRILLATION, UNSPECIFIED TYPE (HCC): ICD-10-CM

## 2018-05-21 DIAGNOSIS — F51.01 PRIMARY INSOMNIA: ICD-10-CM

## 2018-05-21 DIAGNOSIS — J44.9 CHRONIC OBSTRUCTIVE PULMONARY DISEASE, UNSPECIFIED COPD TYPE (HCC): ICD-10-CM

## 2018-05-21 LAB
CREATININE URINE: 74.7 MG/DL
MICROALBUMIN UR-MCNC: <1.2 MG/DL
MICROALBUMIN/CREAT UR-RTO: NORMAL MG/G (ref 0–30)

## 2018-05-21 PROCEDURE — 1036F TOBACCO NON-USER: CPT | Performed by: FAMILY MEDICINE

## 2018-05-21 PROCEDURE — 1123F ACP DISCUSS/DSCN MKR DOCD: CPT | Performed by: FAMILY MEDICINE

## 2018-05-21 PROCEDURE — G8417 CALC BMI ABV UP PARAM F/U: HCPCS | Performed by: FAMILY MEDICINE

## 2018-05-21 PROCEDURE — G8926 SPIRO NO PERF OR DOC: HCPCS | Performed by: FAMILY MEDICINE

## 2018-05-21 PROCEDURE — 4040F PNEUMOC VAC/ADMIN/RCVD: CPT | Performed by: FAMILY MEDICINE

## 2018-05-21 PROCEDURE — G8427 DOCREV CUR MEDS BY ELIG CLIN: HCPCS | Performed by: FAMILY MEDICINE

## 2018-05-21 PROCEDURE — 3023F SPIROM DOC REV: CPT | Performed by: FAMILY MEDICINE

## 2018-05-21 PROCEDURE — G8598 ASA/ANTIPLAT THER USED: HCPCS | Performed by: FAMILY MEDICINE

## 2018-05-21 PROCEDURE — 99214 OFFICE O/P EST MOD 30 MIN: CPT | Performed by: FAMILY MEDICINE

## 2018-05-21 RX ORDER — DORZOLAMIDE HCL 20 MG/ML
SOLUTION/ DROPS OPHTHALMIC
Refills: 5 | COMMUNITY
Start: 2018-05-09 | End: 2022-03-22

## 2018-05-21 RX ORDER — ZOLPIDEM TARTRATE 5 MG/1
5 TABLET ORAL NIGHTLY PRN
Qty: 30 TABLET | Refills: 2 | Status: SHIPPED | OUTPATIENT
Start: 2018-05-21 | End: 2018-10-29 | Stop reason: SDUPTHER

## 2018-05-21 ASSESSMENT — PATIENT HEALTH QUESTIONNAIRE - PHQ9
SUM OF ALL RESPONSES TO PHQ9 QUESTIONS 1 & 2: 0
SUM OF ALL RESPONSES TO PHQ QUESTIONS 1-9: 0
1. LITTLE INTEREST OR PLEASURE IN DOING THINGS: 0
2. FEELING DOWN, DEPRESSED OR HOPELESS: 0

## 2018-05-23 ASSESSMENT — ENCOUNTER SYMPTOMS
COUGH: 0
CONSTIPATION: 0
ABDOMINAL PAIN: 0
CHEST TIGHTNESS: 0
SHORTNESS OF BREATH: 0
VOMITING: 0
DIARRHEA: 0
BLOOD IN STOOL: 0
NAUSEA: 0
TROUBLE SWALLOWING: 0

## 2018-05-31 ENCOUNTER — OFFICE VISIT (OUTPATIENT)
Dept: FAMILY MEDICINE CLINIC | Age: 78
End: 2018-05-31
Payer: MEDICARE

## 2018-05-31 VITALS
BODY MASS INDEX: 47.55 KG/M2 | WEIGHT: 315 LBS | HEART RATE: 80 BPM | DIASTOLIC BLOOD PRESSURE: 70 MMHG | SYSTOLIC BLOOD PRESSURE: 120 MMHG | RESPIRATION RATE: 16 BRPM | TEMPERATURE: 98.7 F

## 2018-05-31 DIAGNOSIS — M10.9 ACUTE GOUTY ARTHRITIS: Primary | ICD-10-CM

## 2018-05-31 DIAGNOSIS — M10.272 ACUTE DRUG-INDUCED GOUT INVOLVING TOE OF LEFT FOOT: ICD-10-CM

## 2018-05-31 PROCEDURE — 4040F PNEUMOC VAC/ADMIN/RCVD: CPT | Performed by: INTERNAL MEDICINE

## 2018-05-31 PROCEDURE — G8417 CALC BMI ABV UP PARAM F/U: HCPCS | Performed by: INTERNAL MEDICINE

## 2018-05-31 PROCEDURE — 1036F TOBACCO NON-USER: CPT | Performed by: INTERNAL MEDICINE

## 2018-05-31 PROCEDURE — G8598 ASA/ANTIPLAT THER USED: HCPCS | Performed by: INTERNAL MEDICINE

## 2018-05-31 PROCEDURE — 99213 OFFICE O/P EST LOW 20 MIN: CPT | Performed by: INTERNAL MEDICINE

## 2018-05-31 PROCEDURE — 1123F ACP DISCUSS/DSCN MKR DOCD: CPT | Performed by: INTERNAL MEDICINE

## 2018-05-31 PROCEDURE — G8427 DOCREV CUR MEDS BY ELIG CLIN: HCPCS | Performed by: INTERNAL MEDICINE

## 2018-05-31 RX ORDER — COLCHICINE 0.6 MG/1
TABLET ORAL
Qty: 30 TABLET | Refills: 2 | Status: SHIPPED | OUTPATIENT
Start: 2018-05-31 | End: 2018-06-08 | Stop reason: SINTOL

## 2018-05-31 ASSESSMENT — ENCOUNTER SYMPTOMS
SHORTNESS OF BREATH: 0
VOMITING: 0
NAUSEA: 0
BACK PAIN: 0
COUGH: 0
SINUS PRESSURE: 0
RHINORRHEA: 0
SORE THROAT: 0
SINUS PAIN: 0
WHEEZING: 0
COLOR CHANGE: 1
DIARRHEA: 0

## 2018-06-04 ENCOUNTER — TELEPHONE (OUTPATIENT)
Dept: FAMILY MEDICINE CLINIC | Age: 78
End: 2018-06-04

## 2018-06-08 ENCOUNTER — OFFICE VISIT (OUTPATIENT)
Dept: FAMILY MEDICINE CLINIC | Age: 78
End: 2018-06-08
Payer: MEDICARE

## 2018-06-08 VITALS
TEMPERATURE: 97.3 F | HEART RATE: 78 BPM | BODY MASS INDEX: 46.65 KG/M2 | WEIGHT: 315 LBS | RESPIRATION RATE: 12 BRPM | DIASTOLIC BLOOD PRESSURE: 60 MMHG | HEIGHT: 69 IN | OXYGEN SATURATION: 92 % | SYSTOLIC BLOOD PRESSURE: 118 MMHG

## 2018-06-08 DIAGNOSIS — I48.91 ATRIAL FIBRILLATION, UNSPECIFIED TYPE (HCC): ICD-10-CM

## 2018-06-08 DIAGNOSIS — M10.071 ACUTE IDIOPATHIC GOUT INVOLVING TOE OF RIGHT FOOT: ICD-10-CM

## 2018-06-08 DIAGNOSIS — M10.071 ACUTE IDIOPATHIC GOUT INVOLVING TOE OF RIGHT FOOT: Primary | ICD-10-CM

## 2018-06-08 LAB
BASOPHILS ABSOLUTE: 0.1 K/UL (ref 0–0.2)
BASOPHILS RELATIVE PERCENT: 0.7 %
EOSINOPHILS ABSOLUTE: 0.2 K/UL (ref 0–0.7)
EOSINOPHILS RELATIVE PERCENT: 1.9 %
HCT VFR BLD CALC: 39.5 % (ref 42–52)
HEMOGLOBIN: 13.6 G/DL (ref 14–18)
INR BLD: 1.9
LYMPHOCYTES ABSOLUTE: 1 K/UL (ref 1–4.8)
LYMPHOCYTES RELATIVE PERCENT: 12.1 %
MCH RBC QN AUTO: 33.4 PG (ref 27–31.3)
MCHC RBC AUTO-ENTMCNC: 34.5 % (ref 33–37)
MCV RBC AUTO: 96.9 FL (ref 80–100)
MONOCYTES ABSOLUTE: 0.5 K/UL (ref 0.2–0.8)
MONOCYTES RELATIVE PERCENT: 5.7 %
NEUTROPHILS ABSOLUTE: 6.4 K/UL (ref 1.4–6.5)
NEUTROPHILS RELATIVE PERCENT: 79.6 %
PDW BLD-RTO: 14.7 % (ref 11.5–14.5)
PLATELET # BLD: 233 K/UL (ref 130–400)
PROTHROMBIN TIME: 19.8 SEC (ref 9.6–12.3)
RBC # BLD: 4.08 M/UL (ref 4.7–6.1)
URIC ACID, SERUM: 8.1 MG/DL (ref 3.4–7)
WBC # BLD: 8.1 K/UL (ref 4.8–10.8)

## 2018-06-08 PROCEDURE — 1036F TOBACCO NON-USER: CPT | Performed by: FAMILY MEDICINE

## 2018-06-08 PROCEDURE — G8417 CALC BMI ABV UP PARAM F/U: HCPCS | Performed by: FAMILY MEDICINE

## 2018-06-08 PROCEDURE — 96372 THER/PROPH/DIAG INJ SC/IM: CPT | Performed by: FAMILY MEDICINE

## 2018-06-08 PROCEDURE — G8598 ASA/ANTIPLAT THER USED: HCPCS | Performed by: FAMILY MEDICINE

## 2018-06-08 PROCEDURE — G8427 DOCREV CUR MEDS BY ELIG CLIN: HCPCS | Performed by: FAMILY MEDICINE

## 2018-06-08 PROCEDURE — 1123F ACP DISCUSS/DSCN MKR DOCD: CPT | Performed by: FAMILY MEDICINE

## 2018-06-08 PROCEDURE — 4040F PNEUMOC VAC/ADMIN/RCVD: CPT | Performed by: FAMILY MEDICINE

## 2018-06-08 PROCEDURE — 99213 OFFICE O/P EST LOW 20 MIN: CPT | Performed by: FAMILY MEDICINE

## 2018-06-08 RX ORDER — METHYLPREDNISOLONE ACETATE 80 MG/ML
80 INJECTION, SUSPENSION INTRA-ARTICULAR; INTRALESIONAL; INTRAMUSCULAR; SOFT TISSUE ONCE
Status: COMPLETED | OUTPATIENT
Start: 2018-06-08 | End: 2018-06-08

## 2018-06-08 RX ADMIN — METHYLPREDNISOLONE ACETATE 80 MG: 80 INJECTION, SUSPENSION INTRA-ARTICULAR; INTRALESIONAL; INTRAMUSCULAR; SOFT TISSUE at 08:37

## 2018-06-08 ASSESSMENT — PATIENT HEALTH QUESTIONNAIRE - PHQ9
SUM OF ALL RESPONSES TO PHQ QUESTIONS 1-9: 0
1. LITTLE INTEREST OR PLEASURE IN DOING THINGS: 0
SUM OF ALL RESPONSES TO PHQ9 QUESTIONS 1 & 2: 0
2. FEELING DOWN, DEPRESSED OR HOPELESS: 0

## 2018-06-11 ENCOUNTER — ANTI-COAG VISIT (OUTPATIENT)
Dept: FAMILY MEDICINE CLINIC | Age: 78
End: 2018-06-11
Payer: MEDICARE

## 2018-06-11 DIAGNOSIS — I48.91 ATRIAL FIBRILLATION, UNSPECIFIED TYPE (HCC): ICD-10-CM

## 2018-06-11 PROCEDURE — 93793 ANTICOAG MGMT PT WARFARIN: CPT | Performed by: FAMILY MEDICINE

## 2018-07-02 ENCOUNTER — HOSPITAL ENCOUNTER (OUTPATIENT)
Dept: NON INVASIVE DIAGNOSTICS | Age: 78
Discharge: HOME | End: 2018-07-02
Payer: MEDICARE

## 2018-07-02 DIAGNOSIS — R60.0 BILATERAL LEG EDEMA: ICD-10-CM

## 2018-07-02 DIAGNOSIS — I48.91 ATRIAL FIBRILLATION, UNSPECIFIED TYPE (HCC): ICD-10-CM

## 2018-07-02 LAB
LV EF: 50 %
LVEF MODALITY: NORMAL

## 2018-07-02 PROCEDURE — 93306 TTE W/DOPPLER COMPLETE: CPT

## 2018-07-17 ENCOUNTER — OFFICE VISIT (OUTPATIENT)
Dept: CARDIOLOGY CLINIC | Age: 78
End: 2018-07-17
Payer: MEDICARE

## 2018-07-17 VITALS
RESPIRATION RATE: 16 BRPM | DIASTOLIC BLOOD PRESSURE: 68 MMHG | SYSTOLIC BLOOD PRESSURE: 124 MMHG | HEART RATE: 82 BPM | BODY MASS INDEX: 44.77 KG/M2 | WEIGHT: 303.2 LBS | OXYGEN SATURATION: 96 %

## 2018-07-17 DIAGNOSIS — I10 ESSENTIAL HYPERTENSION: ICD-10-CM

## 2018-07-17 DIAGNOSIS — I48.91 ATRIAL FIBRILLATION, UNSPECIFIED TYPE (HCC): Primary | ICD-10-CM

## 2018-07-17 DIAGNOSIS — I25.10 CORONARY ARTERY DISEASE INVOLVING NATIVE CORONARY ARTERY OF NATIVE HEART WITHOUT ANGINA PECTORIS: ICD-10-CM

## 2018-07-17 DIAGNOSIS — G47.33 OSA (OBSTRUCTIVE SLEEP APNEA): Chronic | ICD-10-CM

## 2018-07-17 DIAGNOSIS — E66.01 MORBID OBESITY WITH BMI OF 40.0-44.9, ADULT (HCC): ICD-10-CM

## 2018-07-17 DIAGNOSIS — E78.2 MIXED HYPERLIPIDEMIA: ICD-10-CM

## 2018-07-17 DIAGNOSIS — I48.91 ATRIAL FIBRILLATION, UNSPECIFIED TYPE (HCC): ICD-10-CM

## 2018-07-17 LAB
INR BLD: 2.9
PROTHROMBIN TIME: 30.8 SEC (ref 9.6–12.3)

## 2018-07-17 PROCEDURE — G8598 ASA/ANTIPLAT THER USED: HCPCS | Performed by: INTERNAL MEDICINE

## 2018-07-17 PROCEDURE — 99214 OFFICE O/P EST MOD 30 MIN: CPT | Performed by: INTERNAL MEDICINE

## 2018-07-17 PROCEDURE — G8417 CALC BMI ABV UP PARAM F/U: HCPCS | Performed by: INTERNAL MEDICINE

## 2018-07-17 PROCEDURE — 4040F PNEUMOC VAC/ADMIN/RCVD: CPT | Performed by: INTERNAL MEDICINE

## 2018-07-17 PROCEDURE — 93000 ELECTROCARDIOGRAM COMPLETE: CPT | Performed by: INTERNAL MEDICINE

## 2018-07-17 PROCEDURE — 1123F ACP DISCUSS/DSCN MKR DOCD: CPT | Performed by: INTERNAL MEDICINE

## 2018-07-17 PROCEDURE — 1036F TOBACCO NON-USER: CPT | Performed by: INTERNAL MEDICINE

## 2018-07-17 PROCEDURE — 1101F PT FALLS ASSESS-DOCD LE1/YR: CPT | Performed by: INTERNAL MEDICINE

## 2018-07-17 PROCEDURE — G8427 DOCREV CUR MEDS BY ELIG CLIN: HCPCS | Performed by: INTERNAL MEDICINE

## 2018-07-17 RX ORDER — FLUTICASONE FUROATE AND VILANTEROL 200; 25 UG/1; UG/1
POWDER RESPIRATORY (INHALATION)
COMMUNITY
End: 2019-03-12 | Stop reason: ALTCHOICE

## 2018-07-17 NOTE — PROGRESS NOTES
nausea, vomiting, diarrhea, constipation, motor weakness, insomnia, weight loss, syncope, dizziness, lightheadedness, palpitations, PND, orthopnea, or claudication. No bleeding issues on coumadin. 7-31-14: as above, having back issues still. Pt denies chest pain, dyspnea, dyspnea on exertion, change in exercise capacity, fatigue,  nausea, vomiting, diarrhea, constipation, motor weakness, insomnia, weight loss, syncope, dizziness, lightheadedness, palpitations, PND, orthopnea, or claudication. No hosp. No bleeding issues. Compliant with meds. Does not have CPAP, was told he did not need CPAP by Dr. Roel Curry.    1-29-15: as above, still having back pain issues. Was told he doesn't need CPAP machine by DR. Torres. Pt denies chest pain, dyspnea, dyspnea on exertion, change in exercise capacity, fatigue,  nausea, vomiting, diarrhea, constipation, motor weakness, insomnia, weight loss, syncope, dizziness, lightheadedness, palpitations, PND, orthopnea, or claudication.' no recent hospitalizations. No bleeding issues. S/p back surgery with DR. Sana Galicia    10-13-15: as above, doing well ovearall. No change in meds. No hospitalizations. Needs to follow up with Dr. Roel Curry for CPAP/DUARTE. On coumadin, no bleeding issues. Pt denies chest pain, dyspnea, dyspnea on exertion, change in exercise capacity, fatigue,  nausea, vomiting, diarrhea, constipation, motor weakness, insomnia, weight loss, syncope, dizziness, lightheadedness, palpitations, PND, orthopnea, or claudication. Having a hard time losing weight. 11-3-15: as above, s/p ECho with EF of 50%, mild LVH, moderate biatrial enlargement, normal RVSP. Compliant with med. No recent hosptializations. BP is under control. 6-16-16: as above, doing welll overall. Did have some URI type symptoms. No hospitalizations. Remains on coumadin and no bleeding issues. Has not been able to obtain CPAP yet from Dr. Roel Curry. Will need to follow up. Diet and exercise could be better.  Pt denies chest pain, dyspnea, dyspnea on exertion, change in exercise capacity, fatigue,  nausea, vomiting, diarrhea, constipation, motor weakness, insomnia, weight loss, syncope, dizziness, lightheadedness, palpitations, PND, orthopnea,. BP and HR are good. Lipid profile is good. 1-19-17: Pt denies chest pain, dyspnea, dyspnea on exertion, change in exercise capacity, fatigue,  nausea, vomiting, diarrhea, constipation, motor weakness, insomnia, weight loss, syncope, dizziness, lightheadedness, palpitations, PND, orthopnea, or claudication. No nitro use. BP and hr are good. CAD is stable. No LE discoloration or ulcers. No LE edema. No CHF type symptoms. Lipid profile is normal.  DUARTE is under control. No issues with Afibb at this time, no bleeding issues on coumadin. 7-20-17: Pt denies chest pain, dyspnea, dyspnea on exertion, change in exercise capacity, fatigue,  nausea, vomiting, diarrhea, constipation, motor weakness, insomnia, weight loss, syncope, dizziness, lightheadedness, palpitations, PND, orthopnea, or claudication. No nitro use. BP and hr are good. CAD is stable. No LE discoloration or ulcers. No LE edema. No CHF type symptoms. Lipid profile is normal.  No bleeding issues on [address]. Compliant with meds. No hosp. Compliant with CPAP machine. 1-18-18:  States he had left LE ulvers/wounds that have now healed. Was following with [facility] dr Troy Yang. 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 S/p LE venous dupplex US which was negative for DVT and + for right GSV reflux. Remain on coumadin. afibb is under control. Pt denies chest pain, dyspnea, dyspnea on exertion, change in exercise capacity, fatigue,  nausea, vomiting, diarrhea, constipation, motor weakness, insomnia, weight loss, syncope, dizziness, lightheadedness, palpitations, PND, orthopnea. Does have LE edema b/l. Compliant with CPAP machine.        7-17-18: s/p ECHO with EF of 50%, mild LVH, mild RVE, RVSP of 16mmHg, mod MG tablet TAKE ONE TABLET BY MOUTH ONCE DAILY 90 tablet 3    potassium chloride (KLOR-CON M10) 10 MEQ extended release tablet TAKE ONE TABLET BY MOUTH ONCE DAILY 90 tablet 3    simvastatin (ZOCOR) 20 MG tablet Take 1 tablet by mouth nightly 90 tablet 3    terazosin (HYTRIN) 10 MG capsule Take 1 capsule by mouth nightly 90 capsule 3    silver sulfADIAZINE (SILVADENE) 1 % cream Apply topically daily. 50 g 1    CPAP Machine MISC by Does not apply route      brimonidine (ALPHAGAN P) 0.15 % ophthalmic solution       latanoprost (XALATAN) 0.005 % ophthalmic solution       ALPHAGAN P 0.1 % SOLN       levothyroxine (LEVOTHROID) 50 MCG tablet Take 1 tablet by mouth daily. 30 tablet 3    Vitamin D (CHOLECALCIFEROL) 1000 UNITS CAPS capsule Take 1,000 Units by mouth. Every friday       Brimonidine Tartrate (ALPHAGAN P OP) Apply 3 micro curie to eye 2 times daily.  DORZOLAMIDE HCL OP Apply 10 micro curie to eye 2 times daily.  LATANOPROST OP Apply 2.5 micro curie to eye nightly.  Fluticasone Furoate-Vilanterol (BREO ELLIPTA) 200-25 MCG/INH AEPB breo ellipta 200-25 mcg/inh aepb       No current facility-administered medications for this visit. ALLERGIES: Colcrys [colchicine]    Review of Systems:  General ROS: negative  Psychological ROS: negative  Hematological and Lymphatic ROS: No history of blood clots or bleeding disorder. Respiratory ROS: no cough, shortness of breath, or wheezing  Cardiovascular ROS: no chest pain or dyspnea on exertion  Gastrointestinal ROS: no abdominal pain, change in bowel habits, or black or bloody stools  Genito-Urinary ROS: no dysuria, trouble voiding, or hematuria  Musculoskeletal ROS: negative  Neurological ROS: no TIA or stroke symptoms  Dermatological ROS: negative    VITALS:  Blood pressure 124/68, pulse 82, resp. rate 16, weight (!) 303 lb 3.2 oz (137.5 kg), SpO2 96 %. Body mass index is 44.77 kg/m².     Physical Examination:  General appearance - Mixed hyperlipidemia     5. Morbid obesity with BMI of 40.0-44.9, adult ([address]     6. DUARTE (obstructive sleep apnea)           PLAN:     Patient will need to continue to follow up with you for their general medical care and return to see me in the office in 6 months    As always, aggressive risk factor modification is strongly recommended. We should adhere to the [address] guidelines for HTN management and the NCEP ATP III guidelines for LDL-C management. The nature of cardiac risk has been fully discussed with this patient. I have made him aware of his LDL target goal given his cardiovascular risk analysis. I have discussed the appropriate diet. The need for lifelong compliance in order to reduce risk is stressed. A regular exercise program is recommended to help achieve and maintain normal body weight, fitness and improve lipid balance. Continue medications at current doses. Check EKG    Diet and exrecise. Cont with [facility]. Echo prior to next office visit. CPAP qhs. Patient was advised and encouraged to check blood pressure at home or at a pharmacy, maintain a logbook, and also call us back if blood pressure are above the target ranges or if it is low. Patient clearly understands and agrees to the instructions. We will need to continue to monitor muscle and liver enzymes, BUN, CR, and electrolytes. Details of medical condition explained and patient was warned about adverse consequences of uncontrolled medical conditions and possible side effects of prescribed medications. Patient was advised to go to the ER if he starts experiencing adverse effects of the medications. patient was instructed to call us back or go to nearby emergency room immediately if symptoms get worse or do not improve. Thank you for allowing me to participate in the care of your patient, please don't hesitate to contact me if you have any further questions.

## 2018-07-18 ENCOUNTER — ANTI-COAG VISIT (OUTPATIENT)
Dept: FAMILY MEDICINE CLINIC | Age: 78
End: 2018-07-18

## 2018-07-20 ENCOUNTER — TELEPHONE (OUTPATIENT)
Dept: FAMILY MEDICINE CLINIC | Age: 78
End: 2018-07-20

## 2018-07-25 ENCOUNTER — CARE COORDINATION (OUTPATIENT)
Dept: CARE COORDINATION | Age: 78
End: 2018-07-25

## 2018-07-26 ENCOUNTER — CARE COORDINATION (OUTPATIENT)
Dept: CARE COORDINATION | Age: 78
End: 2018-07-26

## 2018-07-26 ENCOUNTER — OFFICE VISIT (OUTPATIENT)
Dept: FAMILY MEDICINE CLINIC | Age: 78
End: 2018-07-26
Payer: MEDICARE

## 2018-07-26 VITALS
BODY MASS INDEX: 45.38 KG/M2 | DIASTOLIC BLOOD PRESSURE: 62 MMHG | WEIGHT: 306.4 LBS | RESPIRATION RATE: 12 BRPM | HEART RATE: 63 BPM | HEIGHT: 69 IN | TEMPERATURE: 97.2 F | SYSTOLIC BLOOD PRESSURE: 114 MMHG | OXYGEN SATURATION: 96 %

## 2018-07-26 DIAGNOSIS — I10 ESSENTIAL HYPERTENSION: ICD-10-CM

## 2018-07-26 DIAGNOSIS — E78.2 MIXED HYPERLIPIDEMIA: ICD-10-CM

## 2018-07-26 DIAGNOSIS — E11.9 TYPE 2 DIABETES MELLITUS WITHOUT COMPLICATION, WITHOUT LONG-TERM CURRENT USE OF INSULIN (HCC): ICD-10-CM

## 2018-07-26 DIAGNOSIS — M10.071 ACUTE IDIOPATHIC GOUT INVOLVING TOE OF RIGHT FOOT: Primary | ICD-10-CM

## 2018-07-26 PROBLEM — L97.921 SKIN ULCER OF LEFT LOWER LEG, LIMITED TO BREAKDOWN OF SKIN (HCC): Status: RESOLVED | Noted: 2017-11-09 | Resolved: 2018-07-26

## 2018-07-26 PROCEDURE — 4040F PNEUMOC VAC/ADMIN/RCVD: CPT | Performed by: FAMILY MEDICINE

## 2018-07-26 PROCEDURE — 1036F TOBACCO NON-USER: CPT | Performed by: FAMILY MEDICINE

## 2018-07-26 PROCEDURE — 1123F ACP DISCUSS/DSCN MKR DOCD: CPT | Performed by: FAMILY MEDICINE

## 2018-07-26 PROCEDURE — 99214 OFFICE O/P EST MOD 30 MIN: CPT | Performed by: FAMILY MEDICINE

## 2018-07-26 PROCEDURE — G8417 CALC BMI ABV UP PARAM F/U: HCPCS | Performed by: FAMILY MEDICINE

## 2018-07-26 PROCEDURE — 1101F PT FALLS ASSESS-DOCD LE1/YR: CPT | Performed by: FAMILY MEDICINE

## 2018-07-26 PROCEDURE — G8427 DOCREV CUR MEDS BY ELIG CLIN: HCPCS | Performed by: FAMILY MEDICINE

## 2018-07-26 PROCEDURE — 96372 THER/PROPH/DIAG INJ SC/IM: CPT | Performed by: FAMILY MEDICINE

## 2018-07-26 PROCEDURE — G8598 ASA/ANTIPLAT THER USED: HCPCS | Performed by: FAMILY MEDICINE

## 2018-07-26 RX ORDER — METHYLPREDNISOLONE ACETATE 80 MG/ML
80 INJECTION, SUSPENSION INTRA-ARTICULAR; INTRALESIONAL; INTRAMUSCULAR; SOFT TISSUE ONCE
Status: COMPLETED | OUTPATIENT
Start: 2018-07-26 | End: 2018-07-26

## 2018-07-26 RX ORDER — INDOMETHACIN 25 MG/1
CAPSULE ORAL
Refills: 0 | COMMUNITY
Start: 2018-07-21 | End: 2018-10-29

## 2018-07-26 RX ORDER — ALLOPURINOL 300 MG/1
300 TABLET ORAL DAILY
Qty: 90 TABLET | Refills: 3 | Status: SHIPPED | OUTPATIENT
Start: 2018-07-26

## 2018-07-26 RX ADMIN — METHYLPREDNISOLONE ACETATE 80 MG: 80 INJECTION, SUSPENSION INTRA-ARTICULAR; INTRALESIONAL; INTRAMUSCULAR; SOFT TISSUE at 09:10

## 2018-08-31 ENCOUNTER — OFFICE VISIT (OUTPATIENT)
Dept: FAMILY MEDICINE CLINIC | Age: 78
End: 2018-08-31

## 2018-08-31 VITALS
OXYGEN SATURATION: 97 % | BODY MASS INDEX: 43.18 KG/M2 | DIASTOLIC BLOOD PRESSURE: 78 MMHG | TEMPERATURE: 96.5 F | WEIGHT: 301.6 LBS | RESPIRATION RATE: 14 BRPM | SYSTOLIC BLOOD PRESSURE: 118 MMHG | HEIGHT: 70 IN | HEART RATE: 77 BPM

## 2018-08-31 DIAGNOSIS — M79.672 FOOT PAIN, LEFT: Primary | ICD-10-CM

## 2018-08-31 DIAGNOSIS — I48.91 ATRIAL FIBRILLATION, UNSPECIFIED TYPE (HCC): ICD-10-CM

## 2018-08-31 LAB
INR BLD: 3.4
PROTHROMBIN TIME: 36.1 SEC (ref 9.6–12.3)

## 2018-08-31 PROCEDURE — 99999 PR OFFICE/OUTPT VISIT,PROCEDURE ONLY: CPT | Performed by: NURSE PRACTITIONER

## 2018-09-04 ENCOUNTER — ANTI-COAG VISIT (OUTPATIENT)
Dept: FAMILY MEDICINE CLINIC | Age: 78
End: 2018-09-04
Payer: MEDICARE

## 2018-09-04 DIAGNOSIS — Z79.01 WARFARIN ANTICOAGULATION: ICD-10-CM

## 2018-09-04 DIAGNOSIS — I48.91 ATRIAL FIBRILLATION, UNSPECIFIED TYPE (HCC): ICD-10-CM

## 2018-09-04 PROCEDURE — 93793 ANTICOAG MGMT PT WARFARIN: CPT | Performed by: FAMILY MEDICINE

## 2018-09-10 ENCOUNTER — CARE COORDINATION (OUTPATIENT)
Dept: CARE COORDINATION | Age: 78
End: 2018-09-10

## 2018-09-10 ENCOUNTER — OFFICE VISIT (OUTPATIENT)
Dept: FAMILY MEDICINE CLINIC | Age: 78
End: 2018-09-10
Payer: MEDICARE

## 2018-09-10 VITALS
HEART RATE: 78 BPM | WEIGHT: 306.6 LBS | OXYGEN SATURATION: 95 % | SYSTOLIC BLOOD PRESSURE: 118 MMHG | HEIGHT: 70 IN | RESPIRATION RATE: 12 BRPM | DIASTOLIC BLOOD PRESSURE: 60 MMHG | BODY MASS INDEX: 43.89 KG/M2 | TEMPERATURE: 97.2 F

## 2018-09-10 DIAGNOSIS — L03.116 CELLULITIS OF LEFT FOOT: Primary | ICD-10-CM

## 2018-09-10 PROCEDURE — G8417 CALC BMI ABV UP PARAM F/U: HCPCS | Performed by: FAMILY MEDICINE

## 2018-09-10 PROCEDURE — G8598 ASA/ANTIPLAT THER USED: HCPCS | Performed by: FAMILY MEDICINE

## 2018-09-10 PROCEDURE — 1036F TOBACCO NON-USER: CPT | Performed by: FAMILY MEDICINE

## 2018-09-10 PROCEDURE — 1101F PT FALLS ASSESS-DOCD LE1/YR: CPT | Performed by: FAMILY MEDICINE

## 2018-09-10 PROCEDURE — 99213 OFFICE O/P EST LOW 20 MIN: CPT | Performed by: FAMILY MEDICINE

## 2018-09-10 PROCEDURE — G8427 DOCREV CUR MEDS BY ELIG CLIN: HCPCS | Performed by: FAMILY MEDICINE

## 2018-09-10 PROCEDURE — 1123F ACP DISCUSS/DSCN MKR DOCD: CPT | Performed by: FAMILY MEDICINE

## 2018-09-10 PROCEDURE — 4040F PNEUMOC VAC/ADMIN/RCVD: CPT | Performed by: FAMILY MEDICINE

## 2018-09-10 RX ORDER — CEPHALEXIN 500 MG/1
500 CAPSULE ORAL 3 TIMES DAILY
Qty: 30 CAPSULE | Refills: 0 | Status: SHIPPED | OUTPATIENT
Start: 2018-09-10 | End: 2018-09-20

## 2018-09-10 ASSESSMENT — PATIENT HEALTH QUESTIONNAIRE - PHQ9
SUM OF ALL RESPONSES TO PHQ QUESTIONS 1-9: 0
1. LITTLE INTEREST OR PLEASURE IN DOING THINGS: 0
SUM OF ALL RESPONSES TO PHQ QUESTIONS 1-9: 0
2. FEELING DOWN, DEPRESSED OR HOPELESS: 0
SUM OF ALL RESPONSES TO PHQ9 QUESTIONS 1 & 2: 0

## 2018-09-10 NOTE — PATIENT INSTRUCTIONS
Patient Education        Cellulitis: Care Instructions  Your Care Instructions    Cellulitis is a skin infection caused by bacteria, most often strep or staph. It often occurs after a break in the skin from a scrape, cut, bite, or puncture, or after a rash. Cellulitis may be treated without doing tests to find out what caused it. But your doctor may do tests, if needed, to look for a specific bacteria, like methicillin-resistant Staphylococcus aureus (MRSA). The doctor has checked you carefully, but problems can develop later. If you notice any problems or new symptoms, get medical treatment right away. Follow-up care is a key part of your treatment and safety. Be sure to make and go to all appointments, and call your doctor if you are having problems. It's also a good idea to know your test results and keep a list of the medicines you take. How can you care for yourself at home? · Take your antibiotics as directed. Do not stop taking them just because you feel better. You need to take the full course of antibiotics. · Prop up the infected area on pillows to reduce pain and swelling. Try to keep the area above the level of your heart as often as you can. · If your doctor told you how to care for your wound, follow your doctor's instructions. If you did not get instructions, follow this general advice:  ¨ Wash the wound with clean water 2 times a day. Don't use hydrogen peroxide or alcohol, which can slow healing. ¨ You may cover the wound with a thin layer of petroleum jelly, such as Vaseline, and a nonstick bandage. ¨ Apply more petroleum jelly and replace the bandage as needed. · Be safe with medicines. Take pain medicines exactly as directed. ¨ If the doctor gave you a prescription medicine for pain, take it as prescribed. ¨ If you are not taking a prescription pain medicine, ask your doctor if you can take an over-the-counter medicine.   To prevent cellulitis in the future  · Try to prevent cuts, scrapes, or other injuries to your skin. Cellulitis most often occurs where there is a break in the skin. · If you get a scrape, cut, mild burn, or bite, wash the wound with clean water as soon as you can to help avoid infection. Don't use hydrogen peroxide or alcohol, which can slow healing. · If you have swelling in your legs (edema), support stockings and good skin care may help prevent leg sores and cellulitis. · Take care of your feet, especially if you have diabetes or other conditions that increase the risk of infection. Wear shoes and socks. Do not go barefoot. If you have athlete's foot or other skin problems on your feet, talk to your doctor about how to treat them. When should you call for help? Call your doctor now or seek immediate medical care if:    · You have signs that your infection is getting worse, such as:  ¨ Increased pain, swelling, warmth, or redness. ¨ Red streaks leading from the area. ¨ Pus draining from the area. ¨ A fever.     · You get a rash.    Watch closely for changes in your health, and be sure to contact your doctor if:    · You do not get better as expected. Where can you learn more? Go to [email or web address]://[employer]. org and sign in to your [employer] account. Enter X667 in the [facility] box to learn more about \"Cellulitis: Care Instructions. \"     If you do not have an account, please click on the \"Sign Up Now\" link. Current as of: May 10, 2017  Content Version: 11.7  © 5323-5287 [employer]. Care instructions adapted under license by [facility] ([facility]). If you have questions about a medical condition or this instruction, always ask your healthcare professional. [place] [address] any warranty or liability for your use of this information.

## 2018-09-10 NOTE — CARE COORDINATION
Ambulatory Care Coordination Note  9/10/2018  CM Risk Score: 3  Bianca Mortality Risk Score: 14.63    ACC: Jackie Hernandez RN    Summary Note: Patient was seen in office today. Patient was seen for LT FOOT/LEG CELLULITIS. Patient was placed on antibiotics by PCP. ACC RECOMMENDED TO PATIENT IF S/S ARE NOT GETTING BETTER IN A FEW DAYS, HE SHOULD CALL OFFICE FOR ADDITIONAL EVAL FROM PCP. ACC reviewed diabetic education which included self-management diabetic ZONES, medication compliance. Patient was encouraged to comply with ADA diet and exercise. The patient denied, CP, SOB, recent falls. Patient does have bilateral lower edema. Jose Enrique received counseling on the following healthy behaviors: SELF MANAGEMENT of chronic health conditions,with goal to improve quality of life and overall wellbeing. The patient is asked to make an attempt to improve diet and exercise patterns to aid in medical management. Patient given educational materials on Diabetes/ CELLULITIS/.     I have instructed Anna Streeter to complete a self tracking handout on Blood Sugars  and Weights and instructed them to bring it with them to his next appointment. Discussed use, benefit, and side effects of prescribed medications. Barriers to medication compliance addressed. All patient questions answered. Pt voiced understanding. Diabetes Assessment    Medic Alert ID:  No  Meal Planning:  Avoidance of concentrated sweets   How often do you test your blood sugar?:  Daily   Do you have barriers with adherence to non-pharmacologic self-management interventions?  (Nutrition/Exercise/Self-Monitoring):  Yes   Have you ever had to go to the ED for symptoms of low blood sugar?:  No       Do you have hyperglycemia symptoms?:  No   Do you have hypoglycemia symptoms?:  No   Last Blood Sugar Value:  168   Blood Sugar Monitoring Regimen:  Morning Fasting   Blood Sugar Trends:  No Change        Lab Results   Component Value Date    LABA1C 6.5 (H) 05/14/2018 LABA1C 6.5 (H) 11/14/2017     Lab Results   Component Value Date    LABMICR <1.20 05/21/2018    [address] 05/14/2018    CREATININE 1.07 08/31/2018         Ambulatory Care Coordination Assessment    Care Coordination Protocol  Referral from Primary Care Provider:  No  Week 1 - Initial Assessment     Do you have all of your prescriptions and are they filled?:  Yes  Barriers to medication adherence:  None  Are you able to afford your medications?:  Yes  How often do you have trouble taking your medications the way you have been told to take them?:  I always take them as prescribed. Do you have Home O2 Therapy?:  No      Ability to seek help/take action for Emergent Urgent situations i.e. fire, crime, inclement weather or health crisis. :  Independent  Ability to ambulate to restroom:  Independent  Ability handle personal hygeine needs (bathing/dressing/grooming): Independent  Ability to manage Medications: Independent  Ability to prepare Food Preparation:  Independent  Ability to maintain home (clean home, laundry): Independent  Ability to drive and/or has transportation:  Independent  Ability to do shopping:  Independent  Ability to manage finances:   Independent  Is patient able to live independently?:  Yes     Current Housing:  Private Residence        Per the Fall Risk Screening, did the patient have 2 or more falls or 1 fall with injury in the past year?:  No     Frequent urination at night?:  No  Do you use rails/bars?:  Yes  Do you have a non-slip tub mat?:  Yes     Are you experiencing loss of meaning?:  No  Are you experiencing loss of hope and peace?:  No     Thinking about your patient's physical health needs, are there any symptoms or problems (risk indicators) you are unsure about that require further investigation?:  No identified areas of uncertainly or problems already being investigated   Are the patients physical health problems impacting on their mental well-being?:  No identified areas of concern   Are there any problems with your patients lifestyle behaviors (alcohol, drugs, diet, exercise) that are impacting on physical or mental well-being?:  No identified areas of concern   Do you have any other concerns about your patients mental well-being? How would you rate their severity and impact on the patient?:  No identified areas of concern   How would you rate their home environment in terms of safety and stability (including domestic violence, insecure housing, neighbor harassment)?:  Consistently safe, supportive, stable, no identified problems   How do daily activities impact on the patient's well-being? (include current or anticipated unemployment, work, caregiving, access to transportation or other):  Some general dissatisfaction but no concern   How would you rate their social network (family, work, friends)?:  Adequate participation with social networks   How would you rate their financial resources (including ability to afford all required medical care)?:  Financially secure, some resource challenges   How wells does the patient now understand their health and well-being (symptoms, signs or risk factors) and what they need to do to manage their health?:  Reasonable to good understanding but do not feel able to engage with advice at this time   How well do you think your patient can engage in healthcare discussions? (Barriers include language, deafness, aphasia, alcohol or drug problems, learning difficulties, concentration): Adequate communication, with or without minor barriers   Do other services need to be involved to help this patient?:  Other care/services in place and adequate   Are current services involved with this patient well-coordinated? (Include coordination with other services you are now recommendation): All required care/services in place and well-coordinated   Suggested Interventions and Community Resources   Zone Management Tools:   In Process         Schedule an

## 2018-09-10 NOTE — PROGRESS NOTES
Chief Complaint   Patient presents with    Foot Swelling     HPI: Oma Meadows is a 77 y.o. male presenting for follow-up of left foot edema and pain. The skin was red and warm plus felt like a hard surface under the skin. He may have been bitten by something. He states it hurts to walk on it because the pain. He was seen in the ER where no therapy was added. EXAM:  Constitutional Blood pressure 118/60, pulse 78, temperature 97.2 °F (36.2 °C), temperature source Temporal, resp. rate 12, height 5' 10\" (1.778 m), weight (!) 306 lb 9.6 oz (139.1 kg), SpO2 95 %. Physical Exam   Constitutional: He is oriented to person, place, and time. He appears well-developed and well-nourished. Cardiovascular: Normal rate, regular rhythm and normal heart sounds. Pulmonary/Chest: Effort normal and breath sounds normal.   Musculoskeletal:   The left foot is swollen with swelling going up to just below the knee. The skin of the dorsum of the left foot is erythematous. Pulses are palpable posterior tibial and dorsalis pedis. Neurological: He is oriented to person, place, and time. DIAGNOSIS:    Diagnosis Orders   1. Cellulitis of left foot  cephALEXin (KEFLEX) 500 MG capsule    Symptomatic, treat. The patient is also asked to continue on the diuretics. Plan for follow up: Follow up in scheduled time with blood work as ordered. Other follow up as needed.       Electronically signed by Victor Hugo Navarro, 2:24 PM 9/10/18

## 2018-10-22 DIAGNOSIS — I10 ESSENTIAL HYPERTENSION: ICD-10-CM

## 2018-10-22 DIAGNOSIS — E78.2 MIXED HYPERLIPIDEMIA: ICD-10-CM

## 2018-10-22 DIAGNOSIS — E11.9 TYPE 2 DIABETES MELLITUS WITHOUT COMPLICATION, WITHOUT LONG-TERM CURRENT USE OF INSULIN (HCC): ICD-10-CM

## 2018-10-22 LAB
ALBUMIN SERPL-MCNC: 4.1 G/DL (ref 3.9–4.9)
ALP BLD-CCNC: 80 U/L (ref 35–104)
ALT SERPL-CCNC: 19 U/L (ref 0–41)
ANION GAP SERPL CALCULATED.3IONS-SCNC: 18 MEQ/L (ref 7–13)
AST SERPL-CCNC: 15 U/L (ref 0–40)
BILIRUB SERPL-MCNC: 0.5 MG/DL (ref 0–1.2)
BUN BLDV-MCNC: 11 MG/DL (ref 8–23)
CALCIUM SERPL-MCNC: 8.9 MG/DL (ref 8.6–10.2)
CHLORIDE BLD-SCNC: 105 MEQ/L (ref 98–107)
CHOLESTEROL, TOTAL: 106 MG/DL (ref 0–199)
CO2: 24 MEQ/L (ref 22–29)
CREAT SERPL-MCNC: 0.98 MG/DL (ref 0.7–1.2)
GFR AFRICAN AMERICAN: >60
GFR NON-AFRICAN AMERICAN: >60
GLOBULIN: 2.3 G/DL (ref 2.3–3.5)
GLUCOSE BLD-MCNC: 147 MG/DL (ref 74–109)
HBA1C MFR BLD: 5.8 % (ref 4.8–5.9)
HDLC SERPL-MCNC: 37 MG/DL (ref 40–59)
LDL CHOLESTEROL CALCULATED: 49 MG/DL (ref 0–129)
POTASSIUM SERPL-SCNC: 3.9 MEQ/L (ref 3.5–5.1)
SODIUM BLD-SCNC: 147 MEQ/L (ref 132–144)
TOTAL PROTEIN: 6.4 G/DL (ref 6.4–8.1)
TRIGL SERPL-MCNC: 100 MG/DL (ref 0–200)

## 2018-10-25 DIAGNOSIS — E11.9 TYPE 2 DIABETES MELLITUS WITHOUT COMPLICATION, WITHOUT LONG-TERM CURRENT USE OF INSULIN (HCC): ICD-10-CM

## 2018-10-25 LAB
CREATININE URINE: 87.9 MG/DL
MICROALBUMIN UR-MCNC: <1.2 MG/DL
MICROALBUMIN/CREAT UR-RTO: NORMAL MG/G (ref 0–30)

## 2018-10-29 ENCOUNTER — OFFICE VISIT (OUTPATIENT)
Dept: FAMILY MEDICINE CLINIC | Age: 78
End: 2018-10-29
Payer: MEDICARE

## 2018-10-29 VITALS
OXYGEN SATURATION: 94 % | DIASTOLIC BLOOD PRESSURE: 64 MMHG | BODY MASS INDEX: 44.07 KG/M2 | TEMPERATURE: 97.4 F | RESPIRATION RATE: 16 BRPM | HEART RATE: 75 BPM | WEIGHT: 307.8 LBS | SYSTOLIC BLOOD PRESSURE: 124 MMHG | HEIGHT: 70 IN

## 2018-10-29 DIAGNOSIS — R60.9 PERIPHERAL EDEMA: ICD-10-CM

## 2018-10-29 DIAGNOSIS — F51.01 PRIMARY INSOMNIA: ICD-10-CM

## 2018-10-29 DIAGNOSIS — Z79.01 WARFARIN ANTICOAGULATION: ICD-10-CM

## 2018-10-29 DIAGNOSIS — I48.91 ATRIAL FIBRILLATION, UNSPECIFIED TYPE (HCC): ICD-10-CM

## 2018-10-29 DIAGNOSIS — Z23 NEED FOR INFLUENZA VACCINATION: ICD-10-CM

## 2018-10-29 DIAGNOSIS — J44.9 CHRONIC OBSTRUCTIVE PULMONARY DISEASE, UNSPECIFIED COPD TYPE (HCC): ICD-10-CM

## 2018-10-29 DIAGNOSIS — E78.2 MIXED HYPERLIPIDEMIA: ICD-10-CM

## 2018-10-29 DIAGNOSIS — E11.9 TYPE 2 DIABETES MELLITUS WITHOUT COMPLICATION, WITHOUT LONG-TERM CURRENT USE OF INSULIN (HCC): ICD-10-CM

## 2018-10-29 DIAGNOSIS — I10 ESSENTIAL HYPERTENSION: Primary | ICD-10-CM

## 2018-10-29 DIAGNOSIS — I10 ESSENTIAL HYPERTENSION: ICD-10-CM

## 2018-10-29 DIAGNOSIS — M10.071 ACUTE IDIOPATHIC GOUT OF RIGHT FOOT: ICD-10-CM

## 2018-10-29 LAB
INR BLD: 1.8
PROTHROMBIN TIME: 19.4 SEC (ref 9.6–12.3)

## 2018-10-29 PROCEDURE — 4040F PNEUMOC VAC/ADMIN/RCVD: CPT | Performed by: FAMILY MEDICINE

## 2018-10-29 PROCEDURE — G8482 FLU IMMUNIZE ORDER/ADMIN: HCPCS | Performed by: FAMILY MEDICINE

## 2018-10-29 PROCEDURE — 1036F TOBACCO NON-USER: CPT | Performed by: FAMILY MEDICINE

## 2018-10-29 PROCEDURE — G0008 ADMIN INFLUENZA VIRUS VAC: HCPCS | Performed by: FAMILY MEDICINE

## 2018-10-29 PROCEDURE — G8427 DOCREV CUR MEDS BY ELIG CLIN: HCPCS | Performed by: FAMILY MEDICINE

## 2018-10-29 PROCEDURE — G8598 ASA/ANTIPLAT THER USED: HCPCS | Performed by: FAMILY MEDICINE

## 2018-10-29 PROCEDURE — 99214 OFFICE O/P EST MOD 30 MIN: CPT | Performed by: FAMILY MEDICINE

## 2018-10-29 PROCEDURE — 1101F PT FALLS ASSESS-DOCD LE1/YR: CPT | Performed by: FAMILY MEDICINE

## 2018-10-29 PROCEDURE — G8417 CALC BMI ABV UP PARAM F/U: HCPCS | Performed by: FAMILY MEDICINE

## 2018-10-29 PROCEDURE — G8926 SPIRO NO PERF OR DOC: HCPCS | Performed by: FAMILY MEDICINE

## 2018-10-29 PROCEDURE — 90662 IIV NO PRSV INCREASED AG IM: CPT | Performed by: FAMILY MEDICINE

## 2018-10-29 PROCEDURE — 3023F SPIROM DOC REV: CPT | Performed by: FAMILY MEDICINE

## 2018-10-29 PROCEDURE — 1123F ACP DISCUSS/DSCN MKR DOCD: CPT | Performed by: FAMILY MEDICINE

## 2018-10-29 RX ORDER — ZOLPIDEM TARTRATE 5 MG/1
5 TABLET ORAL NIGHTLY PRN
Qty: 30 TABLET | Refills: 2 | Status: SHIPPED | OUTPATIENT
Start: 2018-10-29 | End: 2019-01-27

## 2018-10-29 ASSESSMENT — PATIENT HEALTH QUESTIONNAIRE - PHQ9
SUM OF ALL RESPONSES TO PHQ QUESTIONS 1-9: 0
SUM OF ALL RESPONSES TO PHQ9 QUESTIONS 1 & 2: 0
SUM OF ALL RESPONSES TO PHQ QUESTIONS 1-9: 0
2. FEELING DOWN, DEPRESSED OR HOPELESS: 0
1. LITTLE INTEREST OR PLEASURE IN DOING THINGS: 0

## 2018-10-29 NOTE — PROGRESS NOTES
YRS +, IM, PF, PREFILL SYR, 0.5ML (FLUZONE HD)   6. Chronic obstructive pulmonary disease, unspecified COPD type ([address]      Well controlled, continue current treatment. 7. Mixed hyperlipidemia  Lipid Panel      Monitor. 8. Primary insomnia  zolpidem (AMBIEN) 5 MG tablet    Symptomatic, treat. 9. Warfarin anticoagulation  Protime-INR      Check pro time. 10. Acute idiopathic gout of right foot  Uric Acid      Check uric acid level. Plan for follow up: Follow up in 3 months with blood work as ordered. Otherfollow up as needed. Patient will make arrangements for PT/INR depending on results.     Electronically signed by Gertrude Damon, 8:03 PM 11/2/18

## 2018-10-30 ENCOUNTER — ANTI-COAG VISIT (OUTPATIENT)
Dept: FAMILY MEDICINE CLINIC | Age: 78
End: 2018-10-30
Payer: MEDICARE

## 2018-10-30 DIAGNOSIS — Z79.01 WARFARIN ANTICOAGULATION: ICD-10-CM

## 2018-10-30 DIAGNOSIS — I48.91 ATRIAL FIBRILLATION, UNSPECIFIED TYPE (HCC): ICD-10-CM

## 2018-10-30 PROCEDURE — 93793 ANTICOAG MGMT PT WARFARIN: CPT | Performed by: FAMILY MEDICINE

## 2018-10-30 RX ORDER — POTASSIUM CHLORIDE 750 MG/1
TABLET, EXTENDED RELEASE ORAL
Qty: 90 TABLET | Refills: 3 | Status: SHIPPED | OUTPATIENT
Start: 2018-10-30 | End: 2019-10-22 | Stop reason: SDUPTHER

## 2018-10-30 RX ORDER — LISINOPRIL 20 MG/1
TABLET ORAL
Qty: 90 TABLET | Refills: 3 | Status: SHIPPED | OUTPATIENT
Start: 2018-10-30 | End: 2019-10-22 | Stop reason: SDUPTHER

## 2018-10-30 RX ORDER — FINASTERIDE 5 MG/1
TABLET, FILM COATED ORAL
Qty: 90 TABLET | Refills: 3 | Status: SHIPPED | OUTPATIENT
Start: 2018-10-30 | End: 2019-10-22 | Stop reason: SDUPTHER

## 2018-10-30 RX ORDER — FUROSEMIDE 40 MG/1
TABLET ORAL
Qty: 180 TABLET | Refills: 3 | Status: SHIPPED | OUTPATIENT
Start: 2018-10-30 | End: 2019-10-22 | Stop reason: SDUPTHER

## 2018-11-02 PROBLEM — M10.071 ACUTE IDIOPATHIC GOUT OF RIGHT FOOT: Status: ACTIVE | Noted: 2018-11-02

## 2018-11-02 ASSESSMENT — ENCOUNTER SYMPTOMS
SHORTNESS OF BREATH: 0
NAUSEA: 0
DIARRHEA: 0
BLOOD IN STOOL: 0
CHEST TIGHTNESS: 0
ABDOMINAL PAIN: 0
VOMITING: 0
COUGH: 0
CONSTIPATION: 0

## 2018-11-16 ENCOUNTER — CARE COORDINATION (OUTPATIENT)
Dept: CARE COORDINATION | Age: 78
End: 2018-11-16

## 2018-11-20 ENCOUNTER — OFFICE VISIT (OUTPATIENT)
Dept: INTERVENTIONAL RADIOLOGY/VASCULAR | Age: 78
End: 2018-11-20
Payer: MEDICARE

## 2018-11-20 VITALS
OXYGEN SATURATION: 96 % | HEART RATE: 71 BPM | BODY MASS INDEX: 44.19 KG/M2 | RESPIRATION RATE: 14 BRPM | WEIGHT: 308 LBS | DIASTOLIC BLOOD PRESSURE: 60 MMHG | SYSTOLIC BLOOD PRESSURE: 122 MMHG

## 2018-11-20 DIAGNOSIS — I87.2 EDEMA OF RIGHT LOWER LEG DUE TO PERIPHERAL VENOUS INSUFFICIENCY: Primary | ICD-10-CM

## 2018-11-20 DIAGNOSIS — I89.0 LYMPHEDEMA OF BOTH LOWER EXTREMITIES: ICD-10-CM

## 2018-11-20 DIAGNOSIS — R60.0 EDEMA OF RIGHT LOWER LEG DUE TO PERIPHERAL VENOUS INSUFFICIENCY: Primary | ICD-10-CM

## 2018-11-20 PROCEDURE — G8417 CALC BMI ABV UP PARAM F/U: HCPCS | Performed by: NURSE PRACTITIONER

## 2018-11-20 PROCEDURE — 1101F PT FALLS ASSESS-DOCD LE1/YR: CPT | Performed by: NURSE PRACTITIONER

## 2018-11-20 PROCEDURE — G8482 FLU IMMUNIZE ORDER/ADMIN: HCPCS | Performed by: NURSE PRACTITIONER

## 2018-11-20 PROCEDURE — 99205 OFFICE O/P NEW HI 60 MIN: CPT | Performed by: NURSE PRACTITIONER

## 2018-11-20 PROCEDURE — G8428 CUR MEDS NOT DOCUMENT: HCPCS | Performed by: NURSE PRACTITIONER

## 2018-11-20 ASSESSMENT — ENCOUNTER SYMPTOMS
VOMITING: 0
BACK PAIN: 1
EYE PAIN: 0
EYE REDNESS: 0
COUGH: 0
WHEEZING: 0
ABDOMINAL PAIN: 0
NAUSEA: 0
EYE ITCHING: 1
SHORTNESS OF BREATH: 1
ALLERGIC/IMMUNOLOGIC NEGATIVE: 1
SORE THROAT: 0
CONSTIPATION: 0
SINUS PAIN: 0
DIARRHEA: 0
SINUS PRESSURE: 0

## 2018-11-20 NOTE — CARE COORDINATION
Acc left detailed message regarding nature of call and requested return call.  Contact information supplied

## 2018-11-25 ENCOUNTER — CARE COORDINATION (OUTPATIENT)
Dept: CARE COORDINATION | Age: 78
End: 2018-11-25

## 2018-12-06 ENCOUNTER — ANTI-COAG VISIT (OUTPATIENT)
Dept: FAMILY MEDICINE CLINIC | Age: 78
End: 2018-12-06
Payer: MEDICARE

## 2018-12-06 DIAGNOSIS — Z79.01 WARFARIN ANTICOAGULATION: ICD-10-CM

## 2018-12-06 DIAGNOSIS — I48.91 ATRIAL FIBRILLATION, UNSPECIFIED TYPE (HCC): ICD-10-CM

## 2018-12-06 LAB
INR BLD: 2.9
PROTHROMBIN TIME: 28.4 SEC (ref 9–11.5)

## 2018-12-06 PROCEDURE — 93793 ANTICOAG MGMT PT WARFARIN: CPT | Performed by: FAMILY MEDICINE

## 2019-01-07 ENCOUNTER — ANTI-COAG VISIT (OUTPATIENT)
Dept: FAMILY MEDICINE CLINIC | Age: 79
End: 2019-01-07
Payer: MEDICARE

## 2019-01-07 DIAGNOSIS — Z79.01 WARFARIN ANTICOAGULATION: ICD-10-CM

## 2019-01-07 DIAGNOSIS — I48.91 ATRIAL FIBRILLATION, UNSPECIFIED TYPE (HCC): ICD-10-CM

## 2019-01-07 LAB
INR BLD: 2.4
PROTHROMBIN TIME: 23.4 SEC (ref 9–11.5)

## 2019-01-07 PROCEDURE — 93793 ANTICOAG MGMT PT WARFARIN: CPT | Performed by: FAMILY MEDICINE

## 2019-01-09 ENCOUNTER — TELEPHONE (OUTPATIENT)
Dept: FAMILY MEDICINE CLINIC | Age: 79
End: 2019-01-09

## 2019-01-15 ENCOUNTER — OFFICE VISIT (OUTPATIENT)
Dept: CARDIOLOGY CLINIC | Age: 79
End: 2019-01-15
Payer: MEDICARE

## 2019-01-15 ENCOUNTER — CARE COORDINATION (OUTPATIENT)
Dept: CARE COORDINATION | Age: 79
End: 2019-01-15

## 2019-01-15 VITALS
HEART RATE: 71 BPM | BODY MASS INDEX: 43.86 KG/M2 | OXYGEN SATURATION: 98 % | RESPIRATION RATE: 22 BRPM | WEIGHT: 306.4 LBS | HEIGHT: 70 IN | SYSTOLIC BLOOD PRESSURE: 126 MMHG | DIASTOLIC BLOOD PRESSURE: 80 MMHG

## 2019-01-15 DIAGNOSIS — I25.10 CORONARY ARTERY DISEASE INVOLVING NATIVE CORONARY ARTERY OF NATIVE HEART WITHOUT ANGINA PECTORIS: ICD-10-CM

## 2019-01-15 DIAGNOSIS — E78.2 MIXED HYPERLIPIDEMIA: ICD-10-CM

## 2019-01-15 DIAGNOSIS — I48.91 ATRIAL FIBRILLATION, UNSPECIFIED TYPE (HCC): Primary | ICD-10-CM

## 2019-01-15 DIAGNOSIS — I10 ESSENTIAL HYPERTENSION: ICD-10-CM

## 2019-01-15 DIAGNOSIS — E66.01 MORBID OBESITY WITH BMI OF 40.0-44.9, ADULT (HCC): ICD-10-CM

## 2019-01-15 DIAGNOSIS — G47.33 OSA (OBSTRUCTIVE SLEEP APNEA): Chronic | ICD-10-CM

## 2019-01-15 PROCEDURE — G8482 FLU IMMUNIZE ORDER/ADMIN: HCPCS | Performed by: INTERNAL MEDICINE

## 2019-01-15 PROCEDURE — 1101F PT FALLS ASSESS-DOCD LE1/YR: CPT | Performed by: INTERNAL MEDICINE

## 2019-01-15 PROCEDURE — G8598 ASA/ANTIPLAT THER USED: HCPCS | Performed by: INTERNAL MEDICINE

## 2019-01-15 PROCEDURE — 93000 ELECTROCARDIOGRAM COMPLETE: CPT | Performed by: INTERNAL MEDICINE

## 2019-01-15 PROCEDURE — 4040F PNEUMOC VAC/ADMIN/RCVD: CPT | Performed by: INTERNAL MEDICINE

## 2019-01-15 PROCEDURE — 99214 OFFICE O/P EST MOD 30 MIN: CPT | Performed by: INTERNAL MEDICINE

## 2019-01-15 PROCEDURE — 1036F TOBACCO NON-USER: CPT | Performed by: INTERNAL MEDICINE

## 2019-01-15 PROCEDURE — G8427 DOCREV CUR MEDS BY ELIG CLIN: HCPCS | Performed by: INTERNAL MEDICINE

## 2019-01-15 PROCEDURE — G8417 CALC BMI ABV UP PARAM F/U: HCPCS | Performed by: INTERNAL MEDICINE

## 2019-01-15 PROCEDURE — 1123F ACP DISCUSS/DSCN MKR DOCD: CPT | Performed by: INTERNAL MEDICINE

## 2019-01-22 ENCOUNTER — PROCEDURE VISIT (OUTPATIENT)
Dept: INTERVENTIONAL RADIOLOGY/VASCULAR | Age: 79
End: 2019-01-22
Payer: MEDICARE

## 2019-01-22 VITALS
DIASTOLIC BLOOD PRESSURE: 81 MMHG | HEART RATE: 70 BPM | RESPIRATION RATE: 14 BRPM | SYSTOLIC BLOOD PRESSURE: 161 MMHG | OXYGEN SATURATION: 95 %

## 2019-01-22 DIAGNOSIS — I83.893 VARICOSE VEINS OF BILATERAL LOWER EXTREMITIES WITH OTHER COMPLICATIONS: Primary | ICD-10-CM

## 2019-01-22 DIAGNOSIS — R60.0 EDEMA OF RIGHT LOWER LEG DUE TO PERIPHERAL VENOUS INSUFFICIENCY: ICD-10-CM

## 2019-01-22 DIAGNOSIS — I87.2 EDEMA OF RIGHT LOWER LEG DUE TO PERIPHERAL VENOUS INSUFFICIENCY: ICD-10-CM

## 2019-01-22 PROCEDURE — 36475 ENDOVENOUS RF 1ST VEIN: CPT | Performed by: RADIOLOGY

## 2019-01-29 ENCOUNTER — OFFICE VISIT (OUTPATIENT)
Dept: FAMILY MEDICINE CLINIC | Age: 79
End: 2019-01-29
Payer: MEDICARE

## 2019-01-29 VITALS
WEIGHT: 302.8 LBS | SYSTOLIC BLOOD PRESSURE: 130 MMHG | RESPIRATION RATE: 14 BRPM | DIASTOLIC BLOOD PRESSURE: 62 MMHG | HEIGHT: 70 IN | OXYGEN SATURATION: 95 % | HEART RATE: 84 BPM | TEMPERATURE: 97.9 F | BODY MASS INDEX: 43.35 KG/M2

## 2019-01-29 DIAGNOSIS — J44.9 CHRONIC OBSTRUCTIVE PULMONARY DISEASE, UNSPECIFIED COPD TYPE (HCC): Primary | ICD-10-CM

## 2019-01-29 PROCEDURE — 1123F ACP DISCUSS/DSCN MKR DOCD: CPT | Performed by: FAMILY MEDICINE

## 2019-01-29 PROCEDURE — G8427 DOCREV CUR MEDS BY ELIG CLIN: HCPCS | Performed by: FAMILY MEDICINE

## 2019-01-29 PROCEDURE — 99213 OFFICE O/P EST LOW 20 MIN: CPT | Performed by: FAMILY MEDICINE

## 2019-01-29 PROCEDURE — G8482 FLU IMMUNIZE ORDER/ADMIN: HCPCS | Performed by: FAMILY MEDICINE

## 2019-01-29 PROCEDURE — 3023F SPIROM DOC REV: CPT | Performed by: FAMILY MEDICINE

## 2019-01-29 PROCEDURE — G8598 ASA/ANTIPLAT THER USED: HCPCS | Performed by: FAMILY MEDICINE

## 2019-01-29 PROCEDURE — G8926 SPIRO NO PERF OR DOC: HCPCS | Performed by: FAMILY MEDICINE

## 2019-01-29 PROCEDURE — 1036F TOBACCO NON-USER: CPT | Performed by: FAMILY MEDICINE

## 2019-01-29 PROCEDURE — 1101F PT FALLS ASSESS-DOCD LE1/YR: CPT | Performed by: FAMILY MEDICINE

## 2019-01-29 PROCEDURE — G8417 CALC BMI ABV UP PARAM F/U: HCPCS | Performed by: FAMILY MEDICINE

## 2019-01-29 PROCEDURE — 4040F PNEUMOC VAC/ADMIN/RCVD: CPT | Performed by: FAMILY MEDICINE

## 2019-01-29 ASSESSMENT — PATIENT HEALTH QUESTIONNAIRE - PHQ9
SUM OF ALL RESPONSES TO PHQ QUESTIONS 1-9: 0
SUM OF ALL RESPONSES TO PHQ9 QUESTIONS 1 & 2: 0
1. LITTLE INTEREST OR PLEASURE IN DOING THINGS: 0
2. FEELING DOWN, DEPRESSED OR HOPELESS: 0
SUM OF ALL RESPONSES TO PHQ QUESTIONS 1-9: 0

## 2019-02-04 ENCOUNTER — OFFICE VISIT (OUTPATIENT)
Dept: INTERVENTIONAL RADIOLOGY/VASCULAR | Age: 79
End: 2019-02-04
Payer: MEDICARE

## 2019-02-04 VITALS
DIASTOLIC BLOOD PRESSURE: 72 MMHG | SYSTOLIC BLOOD PRESSURE: 120 MMHG | RESPIRATION RATE: 14 BRPM | OXYGEN SATURATION: 94 % | HEART RATE: 76 BPM

## 2019-02-04 DIAGNOSIS — I87.2 EDEMA OF RIGHT LOWER LEG DUE TO PERIPHERAL VENOUS INSUFFICIENCY: Primary | ICD-10-CM

## 2019-02-04 DIAGNOSIS — R60.0 EDEMA OF RIGHT LOWER LEG DUE TO PERIPHERAL VENOUS INSUFFICIENCY: Primary | ICD-10-CM

## 2019-02-04 DIAGNOSIS — I89.0 LYMPHEDEMA OF BOTH LOWER EXTREMITIES: ICD-10-CM

## 2019-02-04 PROCEDURE — 99214 OFFICE O/P EST MOD 30 MIN: CPT | Performed by: NURSE PRACTITIONER

## 2019-02-04 PROCEDURE — 1036F TOBACCO NON-USER: CPT | Performed by: NURSE PRACTITIONER

## 2019-02-04 PROCEDURE — G8598 ASA/ANTIPLAT THER USED: HCPCS | Performed by: NURSE PRACTITIONER

## 2019-02-04 PROCEDURE — G8482 FLU IMMUNIZE ORDER/ADMIN: HCPCS | Performed by: NURSE PRACTITIONER

## 2019-02-04 PROCEDURE — 1101F PT FALLS ASSESS-DOCD LE1/YR: CPT | Performed by: NURSE PRACTITIONER

## 2019-02-04 PROCEDURE — G8428 CUR MEDS NOT DOCUMENT: HCPCS | Performed by: NURSE PRACTITIONER

## 2019-02-04 PROCEDURE — G8417 CALC BMI ABV UP PARAM F/U: HCPCS | Performed by: NURSE PRACTITIONER

## 2019-02-04 PROCEDURE — 4040F PNEUMOC VAC/ADMIN/RCVD: CPT | Performed by: NURSE PRACTITIONER

## 2019-02-04 PROCEDURE — 1123F ACP DISCUSS/DSCN MKR DOCD: CPT | Performed by: NURSE PRACTITIONER

## 2019-02-04 ASSESSMENT — ENCOUNTER SYMPTOMS
BACK PAIN: 1
VOMITING: 0
SORE THROAT: 0
CONSTIPATION: 0
ABDOMINAL PAIN: 0
WHEEZING: 0
EYE PAIN: 0
EYE ITCHING: 0
SHORTNESS OF BREATH: 1
SINUS PAIN: 0
NAUSEA: 0
ALLERGIC/IMMUNOLOGIC NEGATIVE: 1
EYE REDNESS: 0
DIARRHEA: 0
SINUS PRESSURE: 0
COUGH: 0

## 2019-02-14 DIAGNOSIS — Z79.01 WARFARIN ANTICOAGULATION: ICD-10-CM

## 2019-02-14 DIAGNOSIS — I48.91 ATRIAL FIBRILLATION, UNSPECIFIED TYPE (HCC): ICD-10-CM

## 2019-02-14 LAB
INR BLD: 2.6
PROTHROMBIN TIME: 25.7 SEC (ref 9–11.5)

## 2019-02-15 ENCOUNTER — ANTI-COAG VISIT (OUTPATIENT)
Dept: FAMILY MEDICINE CLINIC | Age: 79
End: 2019-02-15
Payer: MEDICARE

## 2019-02-15 DIAGNOSIS — Z79.01 WARFARIN ANTICOAGULATION: ICD-10-CM

## 2019-02-15 DIAGNOSIS — I48.91 ATRIAL FIBRILLATION, UNSPECIFIED TYPE (HCC): ICD-10-CM

## 2019-02-15 PROCEDURE — 93793 ANTICOAG MGMT PT WARFARIN: CPT | Performed by: FAMILY MEDICINE

## 2019-02-26 ENCOUNTER — PROCEDURE VISIT (OUTPATIENT)
Dept: INTERVENTIONAL RADIOLOGY/VASCULAR | Age: 79
End: 2019-02-26

## 2019-02-26 VITALS — RESPIRATION RATE: 14 BRPM | OXYGEN SATURATION: 97 % | HEART RATE: 90 BPM

## 2019-02-26 DIAGNOSIS — I87.2 VENOUS INSUFFICIENCY: ICD-10-CM

## 2019-02-26 PROCEDURE — 99999 PR OFFICE/OUTPT VISIT,PROCEDURE ONLY: CPT | Performed by: RADIOLOGY

## 2019-02-27 ENCOUNTER — TELEPHONE (OUTPATIENT)
Dept: FAMILY MEDICINE CLINIC | Age: 79
End: 2019-02-27

## 2019-03-04 PROBLEM — I87.2 VENOUS INSUFFICIENCY: Status: ACTIVE | Noted: 2019-03-04

## 2019-03-11 DIAGNOSIS — I87.2 VENOUS INSUFFICIENCY: Primary | ICD-10-CM

## 2019-03-12 ENCOUNTER — OFFICE VISIT (OUTPATIENT)
Dept: INTERVENTIONAL RADIOLOGY/VASCULAR | Age: 79
End: 2019-03-12
Payer: MEDICARE

## 2019-03-12 VITALS
SYSTOLIC BLOOD PRESSURE: 138 MMHG | DIASTOLIC BLOOD PRESSURE: 71 MMHG | OXYGEN SATURATION: 92 % | WEIGHT: 303 LBS | BODY MASS INDEX: 43.48 KG/M2 | RESPIRATION RATE: 14 BRPM | HEART RATE: 75 BPM

## 2019-03-12 DIAGNOSIS — I87.2 VENOUS INSUFFICIENCY: Primary | ICD-10-CM

## 2019-03-12 DIAGNOSIS — R60.0 EDEMA OF RIGHT LOWER LEG DUE TO PERIPHERAL VENOUS INSUFFICIENCY: ICD-10-CM

## 2019-03-12 DIAGNOSIS — I87.2 EDEMA OF RIGHT LOWER LEG DUE TO PERIPHERAL VENOUS INSUFFICIENCY: ICD-10-CM

## 2019-03-12 DIAGNOSIS — I89.0 LYMPHEDEMA OF BOTH LOWER EXTREMITIES: ICD-10-CM

## 2019-03-12 PROCEDURE — 4040F PNEUMOC VAC/ADMIN/RCVD: CPT | Performed by: NURSE PRACTITIONER

## 2019-03-12 PROCEDURE — 1123F ACP DISCUSS/DSCN MKR DOCD: CPT | Performed by: NURSE PRACTITIONER

## 2019-03-12 PROCEDURE — 1101F PT FALLS ASSESS-DOCD LE1/YR: CPT | Performed by: NURSE PRACTITIONER

## 2019-03-12 PROCEDURE — G8428 CUR MEDS NOT DOCUMENT: HCPCS | Performed by: NURSE PRACTITIONER

## 2019-03-12 PROCEDURE — G8598 ASA/ANTIPLAT THER USED: HCPCS | Performed by: NURSE PRACTITIONER

## 2019-03-12 PROCEDURE — 1036F TOBACCO NON-USER: CPT | Performed by: NURSE PRACTITIONER

## 2019-03-12 PROCEDURE — G8417 CALC BMI ABV UP PARAM F/U: HCPCS | Performed by: NURSE PRACTITIONER

## 2019-03-12 PROCEDURE — G8482 FLU IMMUNIZE ORDER/ADMIN: HCPCS | Performed by: NURSE PRACTITIONER

## 2019-03-12 PROCEDURE — 99214 OFFICE O/P EST MOD 30 MIN: CPT | Performed by: NURSE PRACTITIONER

## 2019-03-12 ASSESSMENT — ENCOUNTER SYMPTOMS
VOMITING: 0
SORE THROAT: 0
EYE REDNESS: 0
DIARRHEA: 0
BACK PAIN: 1
SINUS PAIN: 0
WHEEZING: 0
ALLERGIC/IMMUNOLOGIC NEGATIVE: 1
CONSTIPATION: 0
COUGH: 0
ABDOMINAL PAIN: 0
NAUSEA: 0
SINUS PRESSURE: 0
SHORTNESS OF BREATH: 1
EYE ITCHING: 0
EYE PAIN: 0

## 2019-03-19 ENCOUNTER — CARE COORDINATION (OUTPATIENT)
Dept: CARE COORDINATION | Age: 79
End: 2019-03-19

## 2019-04-18 ENCOUNTER — CARE COORDINATION (OUTPATIENT)
Dept: CARE COORDINATION | Age: 79
End: 2019-04-18

## 2019-04-18 NOTE — CARE COORDINATION
ACC CALLED PATIENT LEFT DETAILED MESSAGE REGARDING NATURE OF CALL AND REQUESTED RETURN CALL.  CONTACT INFORMATION SUPPLIED

## 2019-04-25 ENCOUNTER — CARE COORDINATION (OUTPATIENT)
Dept: CARE COORDINATION | Age: 79
End: 2019-04-25

## 2019-04-25 NOTE — CARE COORDINATION
ACC CALLED LEFT DETAILED MESSAGE REGARDING NATURE OF CALL AND REQUESTED RETURN CALL.  CONTACT INFORMATION SUPPLIED

## 2019-05-03 ENCOUNTER — CARE COORDINATION (OUTPATIENT)
Dept: CARE COORDINATION | Age: 79
End: 2019-05-03

## 2019-10-10 ENCOUNTER — OFFICE VISIT (OUTPATIENT)
Dept: CARDIOLOGY CLINIC | Age: 79
End: 2019-10-10
Payer: MEDICARE

## 2019-10-10 VITALS
OXYGEN SATURATION: 99 % | WEIGHT: 293.6 LBS | HEART RATE: 63 BPM | RESPIRATION RATE: 14 BRPM | SYSTOLIC BLOOD PRESSURE: 136 MMHG | DIASTOLIC BLOOD PRESSURE: 62 MMHG | BODY MASS INDEX: 42.13 KG/M2

## 2019-10-10 DIAGNOSIS — G47.33 OSA (OBSTRUCTIVE SLEEP APNEA): Chronic | ICD-10-CM

## 2019-10-10 DIAGNOSIS — I10 ESSENTIAL HYPERTENSION: Primary | ICD-10-CM

## 2019-10-10 DIAGNOSIS — E78.2 MIXED HYPERLIPIDEMIA: ICD-10-CM

## 2019-10-10 DIAGNOSIS — I48.91 ATRIAL FIBRILLATION, UNSPECIFIED TYPE (HCC): ICD-10-CM

## 2019-10-10 DIAGNOSIS — R60.0 EDEMA OF RIGHT LOWER LEG DUE TO PERIPHERAL VENOUS INSUFFICIENCY: ICD-10-CM

## 2019-10-10 DIAGNOSIS — I25.10 CORONARY ARTERY DISEASE INVOLVING NATIVE CORONARY ARTERY OF NATIVE HEART WITHOUT ANGINA PECTORIS: ICD-10-CM

## 2019-10-10 DIAGNOSIS — I87.2 EDEMA OF RIGHT LOWER LEG DUE TO PERIPHERAL VENOUS INSUFFICIENCY: ICD-10-CM

## 2019-10-10 PROCEDURE — G8482 FLU IMMUNIZE ORDER/ADMIN: HCPCS | Performed by: INTERNAL MEDICINE

## 2019-10-10 PROCEDURE — G8427 DOCREV CUR MEDS BY ELIG CLIN: HCPCS | Performed by: INTERNAL MEDICINE

## 2019-10-10 PROCEDURE — 4040F PNEUMOC VAC/ADMIN/RCVD: CPT | Performed by: INTERNAL MEDICINE

## 2019-10-10 PROCEDURE — 1123F ACP DISCUSS/DSCN MKR DOCD: CPT | Performed by: INTERNAL MEDICINE

## 2019-10-10 PROCEDURE — 93000 ELECTROCARDIOGRAM COMPLETE: CPT | Performed by: INTERNAL MEDICINE

## 2019-10-10 PROCEDURE — 99214 OFFICE O/P EST MOD 30 MIN: CPT | Performed by: INTERNAL MEDICINE

## 2019-10-10 PROCEDURE — G8417 CALC BMI ABV UP PARAM F/U: HCPCS | Performed by: INTERNAL MEDICINE

## 2019-10-10 PROCEDURE — 1036F TOBACCO NON-USER: CPT | Performed by: INTERNAL MEDICINE

## 2019-10-10 PROCEDURE — G8598 ASA/ANTIPLAT THER USED: HCPCS | Performed by: INTERNAL MEDICINE

## 2019-10-10 RX ORDER — ZOLPIDEM TARTRATE 5 MG/1
5 TABLET ORAL NIGHTLY PRN
COMMUNITY

## 2020-04-27 ENCOUNTER — ANTI-COAG VISIT (OUTPATIENT)
Dept: PRIMARY CARE CLINIC | Age: 80
End: 2020-04-27

## 2020-06-22 RX ORDER — FUROSEMIDE 40 MG/1
TABLET ORAL
Qty: 180 TABLET | Refills: 1 | Status: SHIPPED | OUTPATIENT
Start: 2020-06-22 | End: 2021-03-22

## 2020-06-22 RX ORDER — LISINOPRIL 20 MG/1
TABLET ORAL
Qty: 90 TABLET | Refills: 1 | Status: SHIPPED | OUTPATIENT
Start: 2020-06-22 | End: 2021-03-22

## 2020-06-22 RX ORDER — FINASTERIDE 5 MG/1
TABLET, FILM COATED ORAL
Qty: 90 TABLET | Refills: 1 | Status: SHIPPED | OUTPATIENT
Start: 2020-06-22 | End: 2021-03-22

## 2020-06-22 RX ORDER — POTASSIUM CHLORIDE 750 MG/1
TABLET, EXTENDED RELEASE ORAL
Qty: 90 TABLET | Refills: 1 | Status: SHIPPED | OUTPATIENT
Start: 2020-06-22 | End: 2021-03-22

## 2020-06-22 NOTE — TELEPHONE ENCOUNTER
requesting medication refill. Please approve or deny this request.    Rx requested:  Requested Prescriptions     Pending Prescriptions Disp Refills    potassium chloride (KLOR-CON M) 10 MEQ extended release tablet [Pharmacy Med Name: potassium chloride ER 10 mEq tablet,extended release(part/cryst)] 90 tablet 2     Sig: TAKE 1 TABLET ONCE DAILY    finasteride (PROSCAR) 5 MG tablet [Pharmacy Med Name: finasteride 5 mg tablet] 90 tablet 2     Sig: TAKE 1 TABLET ONCE DAILY    lisinopril (PRINIVIL;ZESTRIL) 20 MG tablet [Pharmacy Med Name: lisinopril 20 mg tablet] 90 tablet 2     Sig: TAKE 1 TABLET ONCE DAILY    furosemide (LASIX) 40 MG tablet [Pharmacy Med Name: furosemide 40 mg tablet] 180 tablet 2     Sig: TAKE 1 TABLET TWICE DAILY         Last Office Visit:   10/10/2019      Next Visit Date:  No future appointments.

## 2020-08-26 ENCOUNTER — TELEPHONE (OUTPATIENT)
Dept: PRIMARY CARE CLINIC | Age: 80
End: 2020-08-26

## 2020-08-26 NOTE — TELEPHONE ENCOUNTER
Scheduling outreach call to review Health Maintenance and / or schedule appointment. AWV due  Colonoscopy n/a  [address] GAP YES  Lab work due   Mammogram n/a  PHQ-9 due  Last appointment none  Upcoming appointment no  Scanned and updated HM yes    Patient following Ramin Burks at [facility] per note 10-10-19.

## 2020-10-13 ENCOUNTER — OFFICE VISIT (OUTPATIENT)
Dept: CARDIOLOGY CLINIC | Age: 80
End: 2020-10-13
Payer: MEDICARE

## 2020-10-13 VITALS
BODY MASS INDEX: 44.77 KG/M2 | DIASTOLIC BLOOD PRESSURE: 80 MMHG | TEMPERATURE: 97.9 F | WEIGHT: 312 LBS | SYSTOLIC BLOOD PRESSURE: 120 MMHG | HEART RATE: 67 BPM

## 2020-10-13 PROCEDURE — 1036F TOBACCO NON-USER: CPT | Performed by: INTERNAL MEDICINE

## 2020-10-13 PROCEDURE — 4040F PNEUMOC VAC/ADMIN/RCVD: CPT | Performed by: INTERNAL MEDICINE

## 2020-10-13 PROCEDURE — 1123F ACP DISCUSS/DSCN MKR DOCD: CPT | Performed by: INTERNAL MEDICINE

## 2020-10-13 PROCEDURE — 99214 OFFICE O/P EST MOD 30 MIN: CPT | Performed by: INTERNAL MEDICINE

## 2020-10-13 PROCEDURE — G8484 FLU IMMUNIZE NO ADMIN: HCPCS | Performed by: INTERNAL MEDICINE

## 2020-10-13 PROCEDURE — G8427 DOCREV CUR MEDS BY ELIG CLIN: HCPCS | Performed by: INTERNAL MEDICINE

## 2020-10-13 PROCEDURE — G8417 CALC BMI ABV UP PARAM F/U: HCPCS | Performed by: INTERNAL MEDICINE

## 2020-10-13 PROCEDURE — 93000 ELECTROCARDIOGRAM COMPLETE: CPT | Performed by: INTERNAL MEDICINE

## 2020-10-13 NOTE — PROGRESS NOTES
Chief Complaint   Patient presents with    Atrial Fibrillation    Hypertension    Hyperlipidemia       Patient presents for initial medical evaluation. Patient is followed on a regular basis by Dr. Melvin Breen MD. Patient with hx of Afibb since 90's, on chronic coumadin therapy. Had cardiac cath in 90's, has not had any recent stress test or echo. Patient with COPD as well, follows Dr. Moses Eddy. Pt denies chest pain, fatigue, nausea, vomiting, diarrhea, constipation, motor weakness, insomnia, weight loss, syncope, dizziness, lightheadedness, palpitations, PND, orthopnea, or claudication. No bleeding issus with coumadin. Recently had a mechanical fall. Had Echo in 5/2012 which showed normal LVF, EF 50%. 9-25-12: as above, s/p negative nuclear stress test with normal LVF, there was suggestion of PHTN due to increased RV uptake. Pt denies chest pain, dyspnea, dyspnea on exertion, change in exercise capacity, fatigue, nausea, vomiting, diarrhea, constipation, motor weakness, insomnia, weight loss, syncope, dizziness, lightheadedness, palpitations, PND, orthopnea, or claudication. Patient has chronic back issues with will see Neurosurgeon/PMR. 3-18-13: as above, doing ok overall, s/p back procedure with Dr. Benjamín Ramos and now feeling much better. No bleeding issues, no recent hospitalizations. Pt denies chest pain, dyspnea, dyspnea on exertion, change in exercise capacity, fatigue, nausea, vomiting, diarrhea, constipation, motor weakness, insomnia, weight loss, syncope, dizziness, lightheadedness, palpitations, PND, orthopnea, or claudication. Does snore a bit a night. No hx of DUARTE. 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     11-21-13: As above, doing well overall. No recent hospitalizations, SL nitro use, or bleeding issues. No change in meds. No recent hospitlizations.  Pt denies chest pain, dyspnea, dyspnea on exertion, change in exercise capacity, fatigue,  nausea, vomiting, diarrhea, constipation, motor weakness, insomnia, weight loss, syncope, dizziness, lightheadedness, palpitations, PND, orthopnea, or claudication. No bleeding issues on coumadin. 7-31-14: as above, having back issues still. Pt denies chest pain, dyspnea, dyspnea on exertion, change in exercise capacity, fatigue,  nausea, vomiting, diarrhea, constipation, motor weakness, insomnia, weight loss, syncope, dizziness, lightheadedness, palpitations, PND, orthopnea, or claudication. No hosp. No bleeding issues. Compliant with meds. Does not have CPAP, was told he did not need CPAP by Dr. Dodson Saint.    1-29-15: as above, still having back pain issues. Was told he doesn't need CPAP machine by DR. Torres. Pt denies chest pain, dyspnea, dyspnea on exertion, change in exercise capacity, fatigue,  nausea, vomiting, diarrhea, constipation, motor weakness, insomnia, weight loss, syncope, dizziness, lightheadedness, palpitations, PND, orthopnea, or claudication.' no recent hospitalizations. No bleeding issues. S/p back surgery with DR. Mcclure [facility]    10-13-15: as above, doing well ovearall. No change in meds. No hospitalizations. Needs to follow up with Dr. Dodson Saint for CPAP/DUARTE. On coumadin, no bleeding issues. Pt denies chest pain, dyspnea, dyspnea on exertion, change in exercise capacity, fatigue,  nausea, vomiting, diarrhea, constipation, motor weakness, insomnia, weight loss, syncope, dizziness, lightheadedness, palpitations, PND, orthopnea, or claudication. Having a hard time losing weight. 11-3-15: as above, s/p ECho with EF of 50%, mild LVH, moderate biatrial enlargement, normal RVSP. Compliant with med. No recent hosptializations. BP is under control. 6-16-16: as above, doing welll overall. Did have some URI type symptoms. No hospitalizations. Remains on coumadin and no bleeding issues. Has not been able to obtain CPAP yet from Dr. Dodson Saint. Will need to follow up. Diet and exercise could be better.  Pt denies chest pain, dyspnea, dyspnea on exertion, change in exercise capacity, fatigue,  nausea, vomiting, diarrhea, constipation, motor weakness, insomnia, weight loss, syncope, dizziness, lightheadedness, palpitations, PND, orthopnea,. BP and HR are good. Lipid profile is good. 1-19-17: Pt denies chest pain, dyspnea, dyspnea on exertion, change in exercise capacity, fatigue,  nausea, vomiting, diarrhea, constipation, motor weakness, insomnia, weight loss, syncope, dizziness, lightheadedness, palpitations, PND, orthopnea, or claudication. No nitro use. BP and hr are good. CAD is stable. No LE discoloration or ulcers. No LE edema. No CHF type symptoms. Lipid profile is normal.  DUARTE is under control. No issues with Afibb at this time, no bleeding issues on coumadin. 7-20-17: Pt denies chest pain, dyspnea, dyspnea on exertion, change in exercise capacity, fatigue,  nausea, vomiting, diarrhea, constipation, motor weakness, insomnia, weight loss, syncope, dizziness, lightheadedness, palpitations, PND, orthopnea, or claudication. No nitro use. BP and hr are good. CAD is stable. No LE discoloration or ulcers. No LE edema. No CHF type symptoms. Lipid profile is normal.  No bleeding issues on [address]. Compliant with meds. No hosp. Compliant with CPAP machine. 1-18-18:  States he had left LE ulvers/wounds that have now healed. Was following with [facility] dr De La Paz Comment. 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 S/p LE venous dupplex US which was negative for DVT and + for right GSV reflux. Remain on coumadin. afibb is under control. Pt denies chest pain, dyspnea, dyspnea on exertion, change in exercise capacity, fatigue,  nausea, vomiting, diarrhea, constipation, motor weakness, insomnia, weight loss, syncope, dizziness, lightheadedness, palpitations, PND, orthopnea. Does have LE edema b/l. Compliant with CPAP machine. 7-17-18: s/p ECHO with EF of 50%, mild LVH, mild RVE, RVSP of 16mmHg, mod LAE.  Compliant with CPAP at night. LE wounds healed up.states he does not have DM. On [address] [place] [address] for chronic Afibb, INR is ok. No bleeding issues. Pt denies chest pain, dyspnea, dyspnea on exertion, change in exercise capacity, fatigue,  nausea, vomiting, diarrhea, constipation, motor weakness, insomnia, weight loss, syncope, dizziness, lightheadedness, palpitations, PND, orthopnea, or claudication. No nitro use. BP and hr are good. CAD is stable. No LE discoloration or ulcers. No LE edema. No CHF type symptoms. Lipid profile is normal. No recent hospitalization. No change in meds. LDL is 80. EKG with afibb at 79bpm.     1-15-19: needs venous ablation with dr. Nina Burton. Doing well overall. Pt denies chest pain, dyspnea, dyspnea on exertion, change in exercise capacity, fatigue,  nausea, vomiting, diarrhea, constipation, motor weakness, insomnia, weight loss, syncope, dizziness, lightheadedness, palpitations, PND, orthopnea, or claudication. No nitro use. BP and hr are good. CAD is stable. No LE discoloration or ulcers. No LE edema. No CHF type symptoms. Lipid profile is normal. No recent hospitalization. No change in meds. On [address] [place] [address] for chronic Afibb, INR is ok EKG in afibb at 92bpm.       10-10-19: EKG with afib, CVR, has hx of chronic afib, on [address] [place] [address]. Pt denies chest pain, dyspnea, dyspnea on exertion, change in exercise capacity, fatigue,  nausea, vomiting, diarrhea, constipation, motor weakness, insomnia, weight loss, syncope, dizziness, lightheadedness, palpitations, PND, orthopnea, or claudication. No nitro use. BP and hr are good. CAD is stable. No LE discoloration or ulcers. No LE edema. No CHF type symptoms. Lipid profile is normal. No recent hospitalization. No change in meds. States he feels great. Hx of DUARTE on cpap.     10/13/2020: Patient with history of chronic atrial fibrillation on oral anticoagulation. He denies any bleeding issues. He denies any lightheadedness, dizziness or syncope. He is compliant with his medications.   No capsule TAKE 1 CAPSULE BY MOUTH NIGHTLY 90 capsule 3    simvastatin (ZOCOR) 20 MG tablet TAKE 1 TABLET BY MOUTH DAILY AT BEDTIME 90 tablet 3    dorzolamide (TRUSOPT) 2 % ophthalmic solution INSTILL 1 DROP INTO BOTH EYES 2 TIMES A DAY  5    CPAP Machine MISC by Does not apply route      brimonidine (ALPHAGAN P) 0.15 % ophthalmic solution       latanoprost (XALATAN) 0.005 % ophthalmic solution       levothyroxine (LEVOTHROID) 50 MCG tablet Take 1 tablet by mouth daily. 30 tablet 3    Vitamin D (CHOLECALCIFEROL) 1000 UNITS CAPS capsule Take 1,000 Units by mouth. Every friday        No current facility-administered medications for this visit. ALLERGIES: Colcrys [colchicine]    Review of Systems:  General ROS: negative  Psychological ROS: negative  Hematological and Lymphatic ROS: No history of blood clots or bleeding disorder. Respiratory ROS: no cough, shortness of breath, or wheezing  Cardiovascular ROS: no chest pain or dyspnea on exertion  Gastrointestinal ROS: no abdominal pain, change in bowel habits, or black or bloody stools  Genito-Urinary ROS: no dysuria, trouble voiding, or hematuria  Musculoskeletal ROS: negative  Neurological ROS: no TIA or stroke symptoms  Dermatological ROS: negative    VITALS:  Blood pressure 120/80, pulse 67, temperature 97.9 °F (36.6 °C), temperature source Infrared, weight (!) 312 lb (141.5 kg). Body mass index is 44.77 kg/m². Physical Examination:  General appearance - alert, well appearing, and in no distress  Mental status - alert, oriented to person, place, and time  Neck - Neck is supple, no JVD or carotid bruits. No thyromegaly or adenopathy.    Chest - clear to auscultation, no wheezes, rales or rhonchi, symmetric air entry  Heart - normal rate, regular rhythm, normal S1, S2, no murmurs, rubs, clicks or gallops  Abdomen - soft, nontender, nondistended, no masses or organomegaly  Neurological - alert, oriented, normal speech, no focal findings or movement disorder noted  Extremities - peripheral pulses normal, no pedal edema, no clubbing or cyanosis  Skin - normal coloration and turgor, no rashes, no suspicious skin lesions noted    LABS:    Chemistry        Component Value Date/Time     05/17/2019 0900    K 3.7 05/17/2019 0900     05/17/2019 0900    CO2 24 10/22/2018 0934    BUN 11 10/22/2018 0934    CREATININE 1.08 05/17/2019 0900        Component Value Date/Time    CALCIUM 9.0 05/17/2019 0900    ALKPHOS 95 05/17/2019 0900    AST 19 05/17/2019 0900    ALT 20 05/17/2019 0900    BILITOT 0.6 05/17/2019 0900            Lab Results   Component Value Date    WBC 7.8 05/17/2019    HGB 12.5 (L) 05/17/2019    HCT 38.6 (L) 05/17/2019     05/17/2019     05/17/2019       No components found for: CHLPL  Lab Results   Component Value Date    TRIG 114 05/17/2019    TRIG 100 10/22/2018    TRIG 125 05/14/2018     Lab Results   Component Value Date    HDL 29.0 (A) 05/17/2019    HDL 37 (L) 10/22/2018    HDL 34 (L) 05/14/2018     Lab Results   Component Value Date    LDLCALC 49 10/22/2018    [address] 05/14/2018    [address] 73 11/14/2017     Lab Results   Component Value Date    LABVLDL 23 05/17/2019       EKG: afibb, old anteroseptal Infarct. 66bpm.     ASSESSMENT:     Diagnosis Orders   1. Atrial fibrillation, unspecified type ([address]  EKG 12 Lead         PLAN:     Patient will need to continue to follow up with you for their general medical care and return to see me in the office in 6 months    As always, aggressive risk factor modification is strongly recommended. We should adhere to the [address] VII guidelines for HTN management and the NCEP ATP III guidelines for LDL-C management. The nature of cardiac risk has been fully discussed with this patient. I have made him aware of his LDL target goal given his cardiovascular risk analysis. I have discussed the appropriate diet. The need for lifelong compliance in order to reduce risk is stressed.  A regular exercise program is recommended to help achieve and maintain normal body weight, fitness and improve lipid balance. Continue medications at current doses. Check EKG    Diet and exrecise. Cont with [address]. Echo in the future if warranted by symptoms. CPAP qhs. Patient was advised and encouraged to check blood pressure at home or at a pharmacy, maintain a logbook, and also call us back if blood pressure are above the target ranges or if it is low. Patient clearly understands and agrees to the instructions. We will need to continue to monitor muscle and liver enzymes, BUN, CR, and electrolytes. Details of medical condition explained and patient was warned about adverse consequences of uncontrolled medical conditions and possible side effects of prescribed medications. Patient was advised to go to the ER if he starts experiencing adverse effects of the medications. patient was instructed to call us back or go to nearby emergency room immediately if symptoms get worse or do not improve. Thank you for allowing me to participate in the care of your patient, please don't hesitate to contact me if you have any further questions.

## 2021-03-19 DIAGNOSIS — R60.9 PERIPHERAL EDEMA: ICD-10-CM

## 2021-03-19 DIAGNOSIS — I10 ESSENTIAL HYPERTENSION: ICD-10-CM

## 2021-03-23 RX ORDER — FINASTERIDE 5 MG/1
TABLET, FILM COATED ORAL
Qty: 90 TABLET | Refills: 3 | Status: SHIPPED | OUTPATIENT
Start: 2021-03-23 | End: 2022-02-23

## 2021-03-23 RX ORDER — POTASSIUM CHLORIDE 750 MG/1
TABLET, EXTENDED RELEASE ORAL
Qty: 90 TABLET | Refills: 3 | Status: SHIPPED | OUTPATIENT
Start: 2021-03-23 | End: 2022-02-23

## 2021-03-23 RX ORDER — FUROSEMIDE 40 MG/1
TABLET ORAL
Qty: 180 TABLET | Refills: 3 | Status: SHIPPED | OUTPATIENT
Start: 2021-03-23 | End: 2022-02-23

## 2021-03-23 RX ORDER — LISINOPRIL 20 MG/1
TABLET ORAL
Qty: 90 TABLET | Refills: 3 | Status: SHIPPED | OUTPATIENT
Start: 2021-03-23 | End: 2022-02-23

## 2021-04-20 ENCOUNTER — OFFICE VISIT (OUTPATIENT)
Dept: CARDIOLOGY CLINIC | Age: 81
End: 2021-04-20
Payer: MEDICARE

## 2021-04-20 VITALS
SYSTOLIC BLOOD PRESSURE: 130 MMHG | BODY MASS INDEX: 45.63 KG/M2 | WEIGHT: 315 LBS | TEMPERATURE: 97.3 F | DIASTOLIC BLOOD PRESSURE: 80 MMHG | HEART RATE: 81 BPM

## 2021-04-20 DIAGNOSIS — E78.2 MIXED HYPERLIPIDEMIA: ICD-10-CM

## 2021-04-20 DIAGNOSIS — I48.91 ATRIAL FIBRILLATION, UNSPECIFIED TYPE (HCC): Primary | ICD-10-CM

## 2021-04-20 DIAGNOSIS — E66.01 MORBID OBESITY WITH BMI OF 40.0-44.9, ADULT (HCC): ICD-10-CM

## 2021-04-20 DIAGNOSIS — I10 ESSENTIAL HYPERTENSION: ICD-10-CM

## 2021-04-20 DIAGNOSIS — G47.33 OSA (OBSTRUCTIVE SLEEP APNEA): Chronic | ICD-10-CM

## 2021-04-20 DIAGNOSIS — I25.10 CORONARY ARTERY DISEASE INVOLVING NATIVE CORONARY ARTERY OF NATIVE HEART WITHOUT ANGINA PECTORIS: ICD-10-CM

## 2021-04-20 PROCEDURE — 93000 ELECTROCARDIOGRAM COMPLETE: CPT | Performed by: INTERNAL MEDICINE

## 2021-04-20 PROCEDURE — 1123F ACP DISCUSS/DSCN MKR DOCD: CPT | Performed by: INTERNAL MEDICINE

## 2021-04-20 PROCEDURE — G8427 DOCREV CUR MEDS BY ELIG CLIN: HCPCS | Performed by: INTERNAL MEDICINE

## 2021-04-20 PROCEDURE — 99214 OFFICE O/P EST MOD 30 MIN: CPT | Performed by: INTERNAL MEDICINE

## 2021-04-20 PROCEDURE — 1036F TOBACCO NON-USER: CPT | Performed by: INTERNAL MEDICINE

## 2021-04-20 PROCEDURE — 4040F PNEUMOC VAC/ADMIN/RCVD: CPT | Performed by: INTERNAL MEDICINE

## 2021-04-20 PROCEDURE — G8417 CALC BMI ABV UP PARAM F/U: HCPCS | Performed by: INTERNAL MEDICINE

## 2021-04-20 NOTE — PROGRESS NOTES
Chief Complaint   Patient presents with    Atrial Fibrillation       Patient presents for initial medical evaluation. Patient is followed on a regular basis by Dr. Cuba Mackay MD. Patient with hx of Afibb since 90's, on chronic coumadin therapy. Had cardiac cath in 90's, has not had any recent stress test or echo. Patient with COPD as well, follows Dr. Amy Seymour. Pt denies chest pain, fatigue, nausea, vomiting, diarrhea, constipation, motor weakness, insomnia, weight loss, syncope, dizziness, lightheadedness, palpitations, PND, orthopnea, or claudication. No bleeding issus with coumadin. Recently had a mechanical fall. Had Echo in 5/2012 which showed normal LVF, EF 50%. 9-25-12: as above, s/p negative nuclear stress test with normal LVF, there was suggestion of PHTN due to increased RV uptake. Pt denies chest pain, dyspnea, dyspnea on exertion, change in exercise capacity, fatigue, nausea, vomiting, diarrhea, constipation, motor weakness, insomnia, weight loss, syncope, dizziness, lightheadedness, palpitations, PND, orthopnea, or claudication. Patient has chronic back issues with will see Neurosurgeon/PMR. 3-18-13: as above, doing ok overall, s/p back procedure with Dr. Dawson Echeverria and now feeling much better. No bleeding issues, no recent hospitalizations. Pt denies chest pain, dyspnea, dyspnea on exertion, change in exercise capacity, fatigue, nausea, vomiting, diarrhea, constipation, motor weakness, insomnia, weight loss, syncope, dizziness, lightheadedness, palpitations, PND, orthopnea, or claudication. Does snore a bit a night. No hx of DUARTE. 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     11-21-13: As above, doing well overall. No recent hospitalizations, SL nitro use, or bleeding issues. No change in meds. No recent hospitlizations.  Pt denies chest pain, dyspnea, dyspnea on exertion, change in exercise capacity, fatigue,  nausea, vomiting, diarrhea, constipation, motor weakness, insomnia, weight loss, syncope, dizziness, lightheadedness, palpitations, PND, orthopnea, or claudication. No bleeding issues on coumadin. 7-31-14: as above, having back issues still. Pt denies chest pain, dyspnea, dyspnea on exertion, change in exercise capacity, fatigue,  nausea, vomiting, diarrhea, constipation, motor weakness, insomnia, weight loss, syncope, dizziness, lightheadedness, palpitations, PND, orthopnea, or claudication. No hosp. No bleeding issues. Compliant with meds. Does not have CPAP, was told he did not need CPAP by Dr. Stanton Mcgraw.    1-29-15: as above, still having back pain issues. Was told he doesn't need CPAP machine by DR. Torres. Pt denies chest pain, dyspnea, dyspnea on exertion, change in exercise capacity, fatigue,  nausea, vomiting, diarrhea, constipation, motor weakness, insomnia, weight loss, syncope, dizziness, lightheadedness, palpitations, PND, orthopnea, or claudication.' no recent hospitalizations. No bleeding issues. S/p back surgery with DR. Yanira Garner    10-13-15: as above, doing well ovearall. No change in meds. No hospitalizations. Needs to follow up with Dr. Stanton Mcgraw for CPAP/DUARTE. On coumadin, no bleeding issues. Pt denies chest pain, dyspnea, dyspnea on exertion, change in exercise capacity, fatigue,  nausea, vomiting, diarrhea, constipation, motor weakness, insomnia, weight loss, syncope, dizziness, lightheadedness, palpitations, PND, orthopnea, or claudication. Having a hard time losing weight. 11-3-15: as above, s/p ECho with EF of 50%, mild LVH, moderate biatrial enlargement, normal RVSP. Compliant with med. No recent hosptializations. BP is under control. 6-16-16: as above, doing welll overall. Did have some URI type symptoms. No hospitalizations. Remains on coumadin and no bleeding issues. Has not been able to obtain CPAP yet from Dr. Stanton Mcgraw. Will need to follow up. Diet and exercise could be better.  Pt denies chest pain, dyspnea, dyspnea on exertion, change in healed up.states he does not have DM. On [address] [place] [address] for chronic Afibb, INR is ok. No bleeding issues. Pt denies chest pain, dyspnea, dyspnea on exertion, change in exercise capacity, fatigue,  nausea, vomiting, diarrhea, constipation, motor weakness, insomnia, weight loss, syncope, dizziness, lightheadedness, palpitations, PND, orthopnea, or claudication. No nitro use. BP and hr are good. CAD is stable. No LE discoloration or ulcers. No LE edema. No CHF type symptoms. Lipid profile is normal. No recent hospitalization. No change in meds. LDL is 80. EKG with afibb at 79bpm.     1-15-19: needs venous ablation with dr. Preston Zuñiga. Doing well overall. Pt denies chest pain, dyspnea, dyspnea on exertion, change in exercise capacity, fatigue,  nausea, vomiting, diarrhea, constipation, motor weakness, insomnia, weight loss, syncope, dizziness, lightheadedness, palpitations, PND, orthopnea, or claudication. No nitro use. BP and hr are good. CAD is stable. No LE discoloration or ulcers. No LE edema. No CHF type symptoms. Lipid profile is normal. No recent hospitalization. No change in meds. On [address] [place] [address] for chronic Afibb, INR is ok EKG in afibb at 92bpm.       10-10-19: EKG with afib, CVR, has hx of chronic afib, on [address] [place] [address]. Pt denies chest pain, dyspnea, dyspnea on exertion, change in exercise capacity, fatigue,  nausea, vomiting, diarrhea, constipation, motor weakness, insomnia, weight loss, syncope, dizziness, lightheadedness, palpitations, PND, orthopnea, or claudication. No nitro use. BP and hr are good. CAD is stable. No LE discoloration or ulcers. No LE edema. No CHF type symptoms. Lipid profile is normal. No recent hospitalization. No change in meds. States he feels great. Hx of DUARTE on cpap.     10/13/2020: Patient with history of chronic atrial fibrillation on oral anticoagulation. He denies any bleeding issues. He denies any lightheadedness, dizziness or syncope. He is compliant with his medications. No recent hospitalizations. Patient with history of DUARTE and he is compliant with his CPAP machine. States he does not have diabetes he is not on any long-term insulin use. Hemoglobin A1c is 6 as of May 2019. No change in his cardiac medications. Blood pressure is 120/80 heart rate of 67 bpm.  His LDL is 50, total cholesterol 102. Triglycerides 114. EKG with atrial fibrillation at 67 bpm.    4-20-21: Patient is doing well overall. He denies any angina or CHF type symptoms. Patient with history of chronic atrial fibrillation on oral anticoagulation. He denies any recent hospitalizations. No bleeding issues. Patient with history of obstructive sleep apnea and he is compliant with his CPAP machine. Blood pressure is 130/80 heart rate of 81 bpm today. He denies any lightheadedness dizziness palpitations or passing out. Lipid profile is okay per patient.   EKG today with A. fib at a heart rate of 81 bpm.      Patient Active Problem List   Diagnosis    Hypertension    Atrial fibrillation ([facility])    Hyperlipidemia    COPD (chronic obstructive pulmonary disease) ([facility])    Benign prostatic hyperplasia    CAD (coronary artery disease)    Insomnia    DUARTE (obstructive sleep apnea)    Morbid obesity with BMI of 40.0-44.9, adult ([facility])    Type 2 diabetes mellitus without complication, without long-term current use of insulin ([facility])    Edema of right lower leg due to peripheral venous insufficiency    Calculi, ureter    Congenital cystic kidney disease    Acute idiopathic gout of right foot    Lymphedema of both lower extremities       Current Outpatient Medications   Medication Sig Dispense Refill    potassium chloride (KLOR-CON M) 10 MEQ extended release tablet TAKE 1 TABLET ONCE DAILY 90 tablet 3    finasteride (PROSCAR) 5 MG tablet TAKE 1 TABLET ONCE DAILY 90 tablet 3    furosemide (LASIX) 40 MG tablet TAKE 1 TABLET TWICE DAILY 180 tablet 3    lisinopril (PRINIVIL;ZESTRIL) 20 MG tablet TAKE 1 TABLET ONCE DAILY 90 tablet 3    zolpidem (AMBIEN) 5 MG tablet Take 5 mg by mouth nightly as needed for Sleep.  umeclidinium-vilanterol (ANORO ELLIPTA) 62.5-25 MCG/INH AEPB inhaler Inhale 1 puff into the lungs daily 3 each 3    warfarin (COUMADIN) 5 MG tablet TAKE ONE AND ONE-HALF TABLETS DAILY, ADJUSTING AS NEEDED 135 tablet 3    PROAIR  (90 Base) MCG/ACT inhaler INHALE 2 PUFFS EVERY 4 HOURS AS NEEDED  2    allopurinol (ZYLOPRIM) 300 MG tablet Take 1 tablet by mouth daily 90 tablet 3    terazosin (HYTRIN) 10 MG capsule TAKE 1 CAPSULE BY MOUTH NIGHTLY 90 capsule 3    simvastatin (ZOCOR) 20 MG tablet TAKE 1 TABLET BY MOUTH DAILY AT BEDTIME 90 tablet 3    dorzolamide (TRUSOPT) 2 % ophthalmic solution INSTILL 1 DROP INTO BOTH EYES 2 TIMES A DAY  5    CPAP Machine MISC by Does not apply route      brimonidine (ALPHAGAN P) 0.15 % ophthalmic solution       latanoprost (XALATAN) 0.005 % ophthalmic solution       levothyroxine (LEVOTHROID) 50 MCG tablet Take 1 tablet by mouth daily. 30 tablet 3    Vitamin D (CHOLECALCIFEROL) 1000 UNITS CAPS capsule Take 1,000 Units by mouth. Every friday        No current facility-administered medications for this visit. ALLERGIES: Colcrys [colchicine]    Review of Systems:  General ROS: negative  Psychological ROS: negative  Hematological and Lymphatic ROS: No history of blood clots or bleeding disorder. Respiratory ROS: no cough, shortness of breath, or wheezing  Cardiovascular ROS: no chest pain or dyspnea on exertion  Gastrointestinal ROS: no abdominal pain, change in bowel habits, or black or bloody stools  Genito-Urinary ROS: no dysuria, trouble voiding, or hematuria  Musculoskeletal ROS: negative  Neurological ROS: no TIA or stroke symptoms  Dermatological ROS: negative    VITALS:  Blood pressure 130/80, pulse 81, temperature 97.3 °F (36.3 °C), temperature source Infrared, weight (!) 318 lb (144.2 kg). Body mass index is 45.63 kg/m².     Physical Examination:  General appearance - alert, well appearing, and in no distress  Mental status - alert, oriented to person, place, and time  Neck - Neck is supple, no JVD or carotid bruits. No thyromegaly or adenopathy. Chest - clear to auscultation, no wheezes, rales or rhonchi, symmetric air entry  Heart - normal rate, regular rhythm, normal S1, S2, no murmurs, rubs, clicks or gallops  Abdomen - soft, nontender, nondistended, no masses or organomegaly  Neurological - alert, oriented, normal speech, no focal findings or movement disorder noted  Extremities - peripheral pulses normal, no pedal edema, no clubbing or cyanosis  Skin - normal coloration and turgor, no rashes, no suspicious skin lesions noted    LABS:    Chemistry        Component Value Date/Time     05/17/2019 0900    K 3.7 05/17/2019 0900     05/17/2019 0900    CO2 24 10/22/2018 0934    BUN 11 10/22/2018 0934    CREATININE 1.08 05/17/2019 0900        Component Value Date/Time    CALCIUM 9.0 05/17/2019 0900    ALKPHOS 95 05/17/2019 0900    AST 19 05/17/2019 0900    ALT 20 05/17/2019 0900    BILITOT 0.6 05/17/2019 0900            Lab Results   Component Value Date    WBC 7.8 05/17/2019    HGB 12.5 (L) 05/17/2019    HCT 38.6 (L) 05/17/2019     05/17/2019     05/17/2019       No components found for: CHLPL  Lab Results   Component Value Date    TRIG 114 05/17/2019    TRIG 100 10/22/2018    TRIG 125 05/14/2018     Lab Results   Component Value Date    HDL 29.0 (A) 05/17/2019    HDL 37 (L) 10/22/2018    HDL 34 (L) 05/14/2018     Lab Results   Component Value Date    LDLCALC 49 10/22/2018    [address] 05/14/2018 [address] 73 11/14/2017     Lab Results   Component Value Date    LABVLDL 23 05/17/2019       EKG: afibb, old anteroseptal Infarct. 66bpm.     ASSESSMENT:     Diagnosis Orders   1. Atrial fibrillation, unspecified type (HCC)  EKG 12 Lead   2. Coronary artery disease involving native coronary artery of native heart without angina pectoris     3.  Morbid obesity with BMI of 40.0-44.9, adult ([address]     4. Mixed hyperlipidemia     5. Essential hypertension     6. DUARTE (obstructive sleep apnea)           PLAN:     Patient will need to continue to follow up with you for their general medical care and return to see me in the office in 6 months    As always, aggressive risk factor modification is strongly recommended. We should adhere to the [address] VII guidelines for HTN management and the NCEP ATP III guidelines for LDL-C management. The nature of cardiac risk has been fully discussed with this patient. I have made him aware of his LDL target goal given his cardiovascular risk analysis. I have discussed the appropriate diet. The need for lifelong compliance in order to reduce risk is stressed. A regular exercise program is recommended to help achieve and maintain normal body weight, fitness and improve lipid balance. Continue medications at current doses. Consider stress test in the near future if warranted by symptoms    Check EKG    Diet and exrecise. Cont with [facility]. Echo in the future if warranted by symptoms. CPAP qhs. Patient was advised and encouraged to check blood pressure at home or at a pharmacy, maintain a logbook, and also call us back if blood pressure are above the target ranges or if it is low. Patient clearly understands and agrees to the instructions. We will need to continue to monitor muscle and liver enzymes, BUN, CR, and electrolytes. Details of medical condition explained and patient was warned about adverse consequences of uncontrolled medical conditions and possible side effects of prescribed medications. Patient was advised to go to the ER if he starts experiencing adverse effects of the medications. patient was instructed to call us back or go to nearby emergency room immediately if symptoms get worse or do not improve.     Thank you for allowing me to participate in the care of your patient, please don't hesitate to contact me if you have any further questions.

## 2021-12-14 DIAGNOSIS — I48.91 ATRIAL FIBRILLATION, UNSPECIFIED TYPE (HCC): ICD-10-CM

## 2021-12-14 RX ORDER — WARFARIN SODIUM 5 MG/1
TABLET ORAL
Qty: 135 TABLET | Refills: 3 | Status: SHIPPED | OUTPATIENT
Start: 2021-12-14 | End: 2022-01-18 | Stop reason: ALTCHOICE

## 2021-12-14 NOTE — TELEPHONE ENCOUNTER
Requesting medication refill. Please approve or deny this request.    Rx requested:  Requested Prescriptions     Pending Prescriptions Disp Refills    warfarin (COUMADIN) 5 MG tablet 135 tablet 3       Last Office Visit:   Visit date not found    Last Filled:      Last Labs:      Next Visit Date:  No future appointments.

## 2021-12-15 NOTE — TELEPHONE ENCOUNTER
Pls have him make first appt with me  Never seen before    Also advise to have coumadin clinic for INR monitoring. When last did he take his meds, including warfarin in particular?

## 2022-01-18 ENCOUNTER — VIRTUAL VISIT (OUTPATIENT)
Dept: CARDIOLOGY CLINIC | Age: 82
End: 2022-01-18
Payer: MEDICARE

## 2022-01-18 DIAGNOSIS — E78.2 MIXED HYPERLIPIDEMIA: ICD-10-CM

## 2022-01-18 DIAGNOSIS — I87.2 EDEMA OF RIGHT LOWER LEG DUE TO PERIPHERAL VENOUS INSUFFICIENCY: ICD-10-CM

## 2022-01-18 DIAGNOSIS — E66.01 MORBID OBESITY WITH BMI OF 40.0-44.9, ADULT (HCC): ICD-10-CM

## 2022-01-18 DIAGNOSIS — I25.10 CORONARY ARTERY DISEASE INVOLVING NATIVE CORONARY ARTERY OF NATIVE HEART WITHOUT ANGINA PECTORIS: ICD-10-CM

## 2022-01-18 DIAGNOSIS — G47.33 OSA (OBSTRUCTIVE SLEEP APNEA): Chronic | ICD-10-CM

## 2022-01-18 DIAGNOSIS — I48.91 ATRIAL FIBRILLATION, UNSPECIFIED TYPE (HCC): Primary | ICD-10-CM

## 2022-01-18 DIAGNOSIS — I10 PRIMARY HYPERTENSION: ICD-10-CM

## 2022-01-18 DIAGNOSIS — R60.0 EDEMA OF RIGHT LOWER LEG DUE TO PERIPHERAL VENOUS INSUFFICIENCY: ICD-10-CM

## 2022-01-18 PROCEDURE — 99442 PR PHYS/QHP TELEPHONE EVALUATION 11-20 MIN: CPT | Performed by: INTERNAL MEDICINE

## 2022-01-18 NOTE — PROGRESS NOTES
No chief complaint on file. TELEHEALTH EVALUATION -- Audio/Visual (During [id number]-83 public health emergency)    Due to COVID 19 outbreak, patient's office visit was converted to a virtual visit. Patient was contacted and agreed to proceed with a virtual visit via Telephone Visit  The risks and benefits of converting to a virtual visit were discussed in light of the current infectious disease epidemic. Patient also understood that insurance coverage and co-pays are up to their individual insurance plans. Pursuant to the emergency declaration under the [facility] [place] [address] waiver authority and the [employer] Act, this Virtual  Visit was conducted, with patient's consent, to reduce the patient's risk of exposure to COVID-19 and provide continuity of care for an established patient. Services were provided through a video synchronous discussion virtually to substitute for in-person clinic visit. Total Virtual Visit time spent:11 min. Please note this report has been partially produced using speech recognition software and may cause contain errors related to that system including grammar, punctuation and spelling as well as words and phrases that may seem inappropriate. If there are questions or concerns please feel free to contact me to clarify. Patient presents for initial medical evaluation. Patient is followed on a regular basis by Dr. Lui Pineda MD. Patient with hx of Afibb since 90's, on chronic coumadin therapy. Had cardiac cath in 90's, has not had any recent stress test or echo. Patient with COPD as well, follows Dr. Young Abreu. Pt denies chest pain, fatigue, nausea, vomiting, diarrhea, constipation, motor weakness, insomnia, weight loss, syncope, dizziness, lightheadedness, palpitations, PND, orthopnea, or claudication. No bleeding issus with coumadin. Recently had a mechanical fall.  Had Echo in 5/2012 which showed normal LVF, EF 50%. 9-25-12: as above, s/p negative nuclear stress test with normal LVF, there was suggestion of PHTN due to increased RV uptake. Pt denies chest pain, dyspnea, dyspnea on exertion, change in exercise capacity, fatigue, nausea, vomiting, diarrhea, constipation, motor weakness, insomnia, weight loss, syncope, dizziness, lightheadedness, palpitations, PND, orthopnea, or claudication. Patient has chronic back issues with will see Neurosurgeon/PMR. 3-18-13: as above, doing ok overall, s/p back procedure with Dr. Ruben Snowden and now feeling much better. No bleeding issues, no recent hospitalizations. Pt denies chest pain, dyspnea, dyspnea on exertion, change in exercise capacity, fatigue, nausea, vomiting, diarrhea, constipation, motor weakness, insomnia, weight loss, syncope, dizziness, lightheadedness, palpitations, PND, orthopnea, or claudication. Does snore a bit a night. No hx of DUARTE. 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     11-21-13: As above, doing well overall. No recent hospitalizations, SL nitro use, or bleeding issues. No change in meds. No recent hospitlizations. Pt denies chest pain, dyspnea, dyspnea on exertion, change in exercise capacity, fatigue,  nausea, vomiting, diarrhea, constipation, motor weakness, insomnia, weight loss, syncope, dizziness, lightheadedness, palpitations, PND, orthopnea, or claudication. No bleeding issues on coumadin. 7-31-14: as above, having back issues still. Pt denies chest pain, dyspnea, dyspnea on exertion, change in exercise capacity, fatigue,  nausea, vomiting, diarrhea, constipation, motor weakness, insomnia, weight loss, syncope, dizziness, lightheadedness, palpitations, PND, orthopnea, or claudication. No hosp. No bleeding issues. Compliant with meds. Does not have CPAP, was told he did not need CPAP by Dr. Reid Easton.    1-29-15: as above, still having back pain issues.  Was told he doesn't need CPAP machine by DR. Torres. Pt denies chest pain, dyspnea, dyspnea on exertion, change in exercise capacity, fatigue,  nausea, vomiting, diarrhea, constipation, motor weakness, insomnia, weight loss, syncope, dizziness, lightheadedness, palpitations, PND, orthopnea, or claudication.' no recent hospitalizations. No bleeding issues. S/p back surgery with DR. Eli Huynh    10-13-15: as above, doing well ovearall. No change in meds. No hospitalizations. Needs to follow up with Dr. Reid Easton for CPAP/DUARTE. On coumadin, no bleeding issues. Pt denies chest pain, dyspnea, dyspnea on exertion, change in exercise capacity, fatigue,  nausea, vomiting, diarrhea, constipation, motor weakness, insomnia, weight loss, syncope, dizziness, lightheadedness, palpitations, PND, orthopnea, or claudication. Having a hard time losing weight. 11-3-15: as above, s/p ECho with EF of 50%, mild LVH, moderate biatrial enlargement, normal RVSP. Compliant with med. No recent hosptializations. BP is under control. 6-16-16: as above, doing welll overall. Did have some URI type symptoms. No hospitalizations. Remains on coumadin and no bleeding issues. Has not been able to obtain CPAP yet from Dr. Reid Easton. Will need to follow up. Diet and exercise could be better. Pt denies chest pain, dyspnea, dyspnea on exertion, change in exercise capacity, fatigue,  nausea, vomiting, diarrhea, constipation, motor weakness, insomnia, weight loss, syncope, dizziness, lightheadedness, palpitations, PND, orthopnea,. BP and HR are good. Lipid profile is good. 1-19-17: Pt denies chest pain, dyspnea, dyspnea on exertion, change in exercise capacity, fatigue,  nausea, vomiting, diarrhea, constipation, motor weakness, insomnia, weight loss, syncope, dizziness, lightheadedness, palpitations, PND, orthopnea, or claudication. No nitro use. BP and hr are good. CAD is stable. No LE discoloration or ulcers. No LE edema. No CHF type symptoms.  Lipid profile is normal.  DUARTE is under control. No issues with Afibb at this time, no bleeding issues on coumadin. 7-20-17: Pt denies chest pain, dyspnea, dyspnea on exertion, change in exercise capacity, fatigue,  nausea, vomiting, diarrhea, constipation, motor weakness, insomnia, weight loss, syncope, dizziness, lightheadedness, palpitations, PND, orthopnea, or claudication. No nitro use. BP and hr are good. CAD is stable. No LE discoloration or ulcers. No LE edema. No CHF type symptoms. Lipid profile is normal.  No bleeding issues on [address] [place] [address]. Compliant with meds. No hosp. Compliant with CPAP machine. 1-18-18:  States he had left LE ulvers/wounds that have now healed. Was following with [facility] dr Lima Deras. 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 S/p LE venous dupplex US which was negative for DVT and + for right GSV reflux. Remain on coumadin. afibb is under control. Pt denies chest pain, dyspnea, dyspnea on exertion, change in exercise capacity, fatigue,  nausea, vomiting, diarrhea, constipation, motor weakness, insomnia, weight loss, syncope, dizziness, lightheadedness, palpitations, PND, orthopnea. Does have LE edema b/l. Compliant with CPAP machine. 7-17-18: s/p ECHO with EF of 50%, mild LVH, mild RVE, RVSP of 16mmHg, mod LAE. Compliant with CPAP at night. LE wounds healed up.states he does not have DM. On [address] [place] [address] for chronic Afibb, INR is ok. No bleeding issues. Pt denies chest pain, dyspnea, dyspnea on exertion, change in exercise capacity, fatigue,  nausea, vomiting, diarrhea, constipation, motor weakness, insomnia, weight loss, syncope, dizziness, lightheadedness, palpitations, PND, orthopnea, or claudication. No nitro use. BP and hr are good. CAD is stable. No LE discoloration or ulcers. No LE edema. No CHF type symptoms. Lipid profile is normal. No recent hospitalization. No change in meds. LDL is 80. EKG with afibb at 79bpm.     1-15-19: needs venous ablation with dr. Keith Patino. Doing well overall.  Pt denies chest pain, dyspnea, dyspnea on exertion, change in exercise capacity, fatigue,  nausea, vomiting, diarrhea, constipation, motor weakness, insomnia, weight loss, syncope, dizziness, lightheadedness, palpitations, PND, orthopnea, or claudication. No nitro use. BP and hr are good. CAD is stable. No LE discoloration or ulcers. No LE edema. No CHF type symptoms. Lipid profile is normal. No recent hospitalization. No change in meds. On [address] for chronic Afibb, INR is ok EKG in afibb at 92bpm.       10-10-19: EKG with afib, CVR, has hx of chronic afib, on [address]. Pt denies chest pain, dyspnea, dyspnea on exertion, change in exercise capacity, fatigue,  nausea, vomiting, diarrhea, constipation, motor weakness, insomnia, weight loss, syncope, dizziness, lightheadedness, palpitations, PND, orthopnea, or claudication. No nitro use. BP and hr are good. CAD is stable. No LE discoloration or ulcers. No LE edema. No CHF type symptoms. Lipid profile is normal. No recent hospitalization. No change in meds. States he feels great. Hx of DUARTE on cpap.     10/13/2020: Patient with history of chronic atrial fibrillation on oral anticoagulation. He denies any bleeding issues. He denies any lightheadedness, dizziness or syncope. He is compliant with his medications. No recent hospitalizations. Patient with history of DUARTE and he is compliant with his CPAP machine. States he does not have diabetes he is not on any long-term insulin use. Hemoglobin A1c is 6 as of May 2019. No change in his cardiac medications. Blood pressure is 120/80 heart rate of 67 bpm.  His LDL is 50, total cholesterol 102. Triglycerides 114. EKG with atrial fibrillation at 67 bpm.    4-20-21: Patient is doing well overall. He denies any angina or CHF type symptoms. Patient with history of chronic atrial fibrillation on oral anticoagulation. He denies any recent hospitalizations. No bleeding issues.   Patient with history of obstructive sleep apnea and he is compliant with his CPAP machine. Blood pressure is 130/80 heart rate of 81 bpm today. He denies any lightheadedness dizziness palpitations or passing out. Lipid profile is okay per patient. EKG today with A. fib at a heart rate of 81 bpm.    1-18-22: patient with recent hospitalization for GIB/PUD  2 weeks ago at [facility], coumadin was stopped. S/p echo with EF of 50%. does have some LE edema and was placed on diuretics/torsemide. Patient with history of chronic atrial fibrillation. Patient Active Problem List   Diagnosis    Hypertension    [facility] ([facility] [address]    Hyperlipidemia    COPD [facility]chronic obstructive pulmonary disease) [facility]    Benign prostatic hyperplasia    CAD (coronary artery disease)    Insomnia    DUARTE (obstructive sleep apnea)    Morbid obesity with BMI of 40.0-44.9, adult ([facility])    Type 2 diabetes mellitus without complication, without long-term current use of insulin ([facility])    Edema of right lower leg due to peripheral venous insufficiency    Calculi, ureter    Congenital cystic kidney disease    Acute idiopathic gout of right foot    Lymphedema of both lower extremities       Current Outpatient Medications   Medication Sig Dispense Refill    potassium chloride (KLOR-CON M) 10 MEQ extended release tablet TAKE 1 TABLET ONCE DAILY 90 tablet 3    finasteride (PROSCAR) 5 MG tablet TAKE 1 TABLET ONCE DAILY 90 tablet 3    furosemide (LASIX) 40 MG tablet TAKE 1 TABLET TWICE DAILY 180 tablet 3    lisinopril (PRINIVIL;ZESTRIL) 20 MG tablet TAKE 1 TABLET ONCE DAILY 90 tablet 3    zolpidem (AMBIEN) 5 MG tablet Take 5 mg by mouth nightly as needed for Sleep.       umeclidinium-vilanterol (ANORO ELLIPTA) 62.5-25 MCG/INH AEPB inhaler Inhale 1 puff into the lungs daily 3 each 3    PROAIR  (90 Base) MCG/ACT inhaler INHALE 2 PUFFS EVERY 4 HOURS AS NEEDED  2    allopurinol (ZYLOPRIM) 300 MG tablet Take 1 tablet by mouth daily 90 tablet 3    terazosin (HYTRIN) 10 MG capsule TAKE 1 CAPSULE BY MOUTH NIGHTLY 90 capsule 3    simvastatin (ZOCOR) 20 MG tablet TAKE 1 TABLET BY MOUTH DAILY AT BEDTIME 90 tablet 3    dorzolamide (TRUSOPT) 2 % ophthalmic solution INSTILL 1 DROP INTO BOTH EYES 2 TIMES A DAY  5    CPAP Machine MISC by Does not apply route      brimonidine (ALPHAGAN P) 0.15 % ophthalmic solution       latanoprost (XALATAN) 0.005 % ophthalmic solution       levothyroxine (LEVOTHROID) 50 MCG tablet Take 1 tablet by mouth daily. 30 tablet 3    Vitamin D (CHOLECALCIFEROL) 1000 UNITS CAPS capsule Take 1,000 Units by mouth. Every friday        No current facility-administered medications for this visit. ALLERGIES: Colcrys [colchicine]    Review of Systems:  General ROS: negative  Psychological ROS: negative  Hematological and Lymphatic ROS: No history of blood clots or bleeding disorder. Respiratory ROS: no cough, shortness of breath, or wheezing  Cardiovascular ROS: no chest pain or dyspnea on exertion  Gastrointestinal ROS: no abdominal pain, change in bowel habits, or black or bloody stools  Genito-Urinary ROS: no dysuria, trouble voiding, or hematuria  Musculoskeletal ROS: negative  Neurological ROS: no TIA or stroke symptoms  Dermatological ROS: negative    VITALS:  There were no vitals taken for this visit. There is no height or weight on file to calculate BMI.       PHYSICAL EXAMINATION:  [ INSTRUCTIONS:  \"[x]\" Indicates a positive item  \"[]\" Indicates a negative item  -- DELETE ALL ITEMS NOT EXAMINED]  [x] Alert  [x] Oriented to person/place/time    [x] No apparent distress  [] Toxic appearing    [] Face flushed appearing [x] Sclera clear  [] Lips are cyanotic      [x] Breathing appears normal  [] Appears tachypneic      [] Rash on visible skin    [] Cranial Nerves II-XII grossly intact    [] Motor grossly intact in visible upper extremities    [] Motor grossly intact in visible lower extremities    [x] Normal Mood  [] Anxious appearing    [] Depressed appearing  [] Confused appearing      [] Poor short term memory  [] Poor long term memory    [] OTHER:      Due to this being a TeleHealth encounter, evaluation of the following organ systems is limited: Vitals/Constitutional/EENT/Resp/CV/GI//MS/Neuro/Skin/Heme-Lymph-Imm. LABS:    Chemistry        Component Value Date/Time     05/17/2019 0900    K 3.7 05/17/2019 0900     05/17/2019 0900    CO2 24 10/22/2018 0934    BUN 11 10/22/2018 0934    CREATININE 1.08 05/17/2019 0900        Component Value Date/Time    CALCIUM 9.0 05/17/2019 0900    ALKPHOS 95 05/17/2019 0900    AST 19 05/17/2019 0900    ALT 20 05/17/2019 0900    BILITOT 0.6 05/17/2019 0900            Lab Results   Component Value Date    WBC 7.8 05/17/2019    HGB 12.5 (L) 05/17/2019    HCT 38.6 (L) 05/17/2019     05/17/2019     05/17/2019       No components found for: CHLPL  Lab Results   Component Value Date    TRIG 114 05/17/2019    TRIG 100 10/22/2018    TRIG 125 05/14/2018     Lab Results   Component Value Date    HDL 29.0 (A) 05/17/2019    HDL 37 (L) 10/22/2018    HDL 34 (L) 05/14/2018     Lab Results   Component Value Date    LDLCALC 49 10/22/2018    [address] 80 05/14/2018    [address] 73 11/14/2017     Lab Results   Component Value Date    LABVLDL 23 05/17/2019       EKG: afibb, old anteroseptal Infarct. 66bpm.     ASSESSMENT:     Diagnosis Orders   1. Atrial fibrillation, unspecified type ([facility] [address]     2. Coronary artery disease involving native coronary artery of native heart without angina pectoris     3. Mixed hyperlipidemia     4. Primary hypertension     5. DUARTE (obstructive sleep apnea)     6. Morbid obesity with BMI of 40.0-44.9, adult ([facility] [address]     7. Edema of right lower leg due to peripheral venous insufficiency           PLAN:       As always, aggressive risk factor modification is strongly recommended. We should adhere to the [address] VII guidelines for HTN management and the NCEP ATP III guidelines for LDL-C management.     The nature of cardiac risk has been fully discussed with this patient. I have made him aware of his LDL target goal given his cardiovascular risk analysis. I have discussed the appropriate diet. The need for lifelong compliance in order to reduce risk is stressed. A regular exercise program is recommended to help achieve and maintain normal body weight, fitness and improve lipid balance. Continue medications at current doses. Follow up with GI and resume COumadin/DOAC when ok with GI. Will consider watchman device if unable to tolerate [address]. Consider stress test in the near future if warranted by symptoms    Check EKG next OV    Diet and exrecise. CPAP qhs. Patient was advised and encouraged to check blood pressure at home or at a pharmacy, maintain a logbook, and also call us back if blood pressure are above the target ranges or if it is low. Patient clearly understands and agrees to the instructions. We will need to continue to monitor muscle and liver enzymes, BUN, CR, and electrolytes. Details of medical condition explained and patient was warned about adverse consequences of uncontrolled medical conditions and possible side effects of prescribed medications. Patient was advised to go to the ER if he starts experiencing adverse effects of the medications. patient was instructed to call us back or go to nearby emergency room immediately if symptoms get worse or do not improve. Thank you for allowing me to participate in the care of your patient, please don't hesitate to contact me if you have any further questions.

## 2022-02-22 DIAGNOSIS — R60.9 PERIPHERAL EDEMA: ICD-10-CM

## 2022-02-22 DIAGNOSIS — I10 ESSENTIAL HYPERTENSION: ICD-10-CM

## 2022-02-22 NOTE — TELEPHONE ENCOUNTER
Please approve or deny this refill request. The order is pended. Thank you.     LOV 1/18/2022    Next Visit Date:  Future Appointments   Date Time Provider Dwayne Mckeon   3/22/2022 10:00 AM DO Diego Huerta

## 2022-02-23 RX ORDER — POTASSIUM CHLORIDE 750 MG/1
TABLET, EXTENDED RELEASE ORAL
Qty: 90 TABLET | Refills: 3 | Status: SHIPPED | OUTPATIENT
Start: 2022-02-23

## 2022-02-23 RX ORDER — FINASTERIDE 5 MG/1
TABLET, FILM COATED ORAL
Qty: 90 TABLET | Refills: 3 | Status: SHIPPED | OUTPATIENT
Start: 2022-02-23

## 2022-02-23 RX ORDER — LISINOPRIL 20 MG/1
TABLET ORAL
Qty: 90 TABLET | Refills: 3 | Status: SHIPPED | OUTPATIENT
Start: 2022-02-23 | End: 2022-03-22

## 2022-02-23 RX ORDER — FUROSEMIDE 40 MG/1
TABLET ORAL
Qty: 180 TABLET | Refills: 3 | Status: SHIPPED | OUTPATIENT
Start: 2022-02-23 | End: 2022-03-22

## 2022-03-22 ENCOUNTER — OFFICE VISIT (OUTPATIENT)
Dept: CARDIOLOGY CLINIC | Age: 82
End: 2022-03-22
Payer: MEDICARE

## 2022-03-22 VITALS
DIASTOLIC BLOOD PRESSURE: 64 MMHG | HEIGHT: 70 IN | HEART RATE: 83 BPM | BODY MASS INDEX: 45.1 KG/M2 | WEIGHT: 315 LBS | SYSTOLIC BLOOD PRESSURE: 138 MMHG | OXYGEN SATURATION: 95 %

## 2022-03-22 DIAGNOSIS — E66.01 MORBID OBESITY WITH BMI OF 40.0-44.9, ADULT (HCC): ICD-10-CM

## 2022-03-22 DIAGNOSIS — I10 BENIGN ESSENTIAL HYPERTENSION: ICD-10-CM

## 2022-03-22 DIAGNOSIS — E78.2 MIXED HYPERLIPIDEMIA: ICD-10-CM

## 2022-03-22 DIAGNOSIS — G47.33 OSA (OBSTRUCTIVE SLEEP APNEA): Chronic | ICD-10-CM

## 2022-03-22 DIAGNOSIS — I48.91 ATRIAL FIBRILLATION, UNSPECIFIED TYPE (HCC): Primary | ICD-10-CM

## 2022-03-22 DIAGNOSIS — I10 PRIMARY HYPERTENSION: ICD-10-CM

## 2022-03-22 DIAGNOSIS — I10 ESSENTIAL HYPERTENSION: ICD-10-CM

## 2022-03-22 DIAGNOSIS — I25.10 CORONARY ARTERY DISEASE INVOLVING NATIVE CORONARY ARTERY OF NATIVE HEART WITHOUT ANGINA PECTORIS: ICD-10-CM

## 2022-03-22 PROBLEM — M10.00 IDIOPATHIC GOUT: Status: ACTIVE | Noted: 2022-03-22

## 2022-03-22 PROCEDURE — 4004F PT TOBACCO SCREEN RCVD TLK: CPT | Performed by: INTERNAL MEDICINE

## 2022-03-22 PROCEDURE — 93000 ELECTROCARDIOGRAM COMPLETE: CPT | Performed by: INTERNAL MEDICINE

## 2022-03-22 PROCEDURE — 1123F ACP DISCUSS/DSCN MKR DOCD: CPT | Performed by: INTERNAL MEDICINE

## 2022-03-22 PROCEDURE — 99214 OFFICE O/P EST MOD 30 MIN: CPT | Performed by: INTERNAL MEDICINE

## 2022-03-22 PROCEDURE — 4040F PNEUMOC VAC/ADMIN/RCVD: CPT | Performed by: INTERNAL MEDICINE

## 2022-03-22 PROCEDURE — G8417 CALC BMI ABV UP PARAM F/U: HCPCS | Performed by: INTERNAL MEDICINE

## 2022-03-22 PROCEDURE — G8484 FLU IMMUNIZE NO ADMIN: HCPCS | Performed by: INTERNAL MEDICINE

## 2022-03-22 PROCEDURE — G8427 DOCREV CUR MEDS BY ELIG CLIN: HCPCS | Performed by: INTERNAL MEDICINE

## 2022-03-22 RX ORDER — WARFARIN SODIUM 5 MG/1
TABLET ORAL
COMMUNITY
Start: 2022-03-13

## 2022-03-22 RX ORDER — SPIRONOLACTONE 25 MG/1
TABLET ORAL
COMMUNITY
Start: 2022-03-13

## 2022-03-22 RX ORDER — PANTOPRAZOLE SODIUM 40 MG/1
TABLET, DELAYED RELEASE ORAL
COMMUNITY
Start: 2022-01-10

## 2022-03-22 RX ORDER — TORSEMIDE 20 MG/1
TABLET ORAL
COMMUNITY
Start: 2022-01-11

## 2022-03-22 NOTE — PROGRESS NOTES
Chief Complaint   Patient presents with    Follow-up    Atrial Fibrillation         Patient presents for initial medical evaluation. Patient is followed on a regular basis by Dr. Mason Paiz MD. Patient with hx of Afibb since 90's, on chronic coumadin therapy. Had cardiac cath in 90's, has not had any recent stress test or echo. Patient with COPD as well, follows Dr. Adarsh Irwin. Pt denies chest pain, fatigue, nausea, vomiting, diarrhea, constipation, motor weakness, insomnia, weight loss, syncope, dizziness, lightheadedness, palpitations, PND, orthopnea, or claudication. No bleeding issus with coumadin. Recently had a mechanical fall. Had Echo in 5/2012 which showed normal LVF, EF 50%. 9-25-12: as above, s/p negative nuclear stress test with normal LVF, there was suggestion of PHTN due to increased RV uptake. Pt denies chest pain, dyspnea, dyspnea on exertion, change in exercise capacity, fatigue, nausea, vomiting, diarrhea, constipation, motor weakness, insomnia, weight loss, syncope, dizziness, lightheadedness, palpitations, PND, orthopnea, or claudication. Patient has chronic back issues with will see Neurosurgeon/PMR. 3-18-13: as above, doing ok overall, s/p back procedure with Dr. Gilbert Hanks and now feeling much better. No bleeding issues, no recent hospitalizations. Pt denies chest pain, dyspnea, dyspnea on exertion, change in exercise capacity, fatigue, nausea, vomiting, diarrhea, constipation, motor weakness, insomnia, weight loss, syncope, dizziness, lightheadedness, palpitations, PND, orthopnea, or claudication. Does snore a bit a night. No hx of DUARTE. 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     11-21-13: As above, doing well overall. No recent hospitalizations, SL nitro use, or bleeding issues. No change in meds. No recent hospitlizations.  Pt denies chest pain, dyspnea, dyspnea on exertion, change in exercise capacity, fatigue,  nausea, vomiting, diarrhea, constipation, motor weakness, insomnia, weight loss, syncope, dizziness, lightheadedness, palpitations, PND, orthopnea, or claudication. No bleeding issues on coumadin. 7-31-14: as above, having back issues still. Pt denies chest pain, dyspnea, dyspnea on exertion, change in exercise capacity, fatigue,  nausea, vomiting, diarrhea, constipation, motor weakness, insomnia, weight loss, syncope, dizziness, lightheadedness, palpitations, PND, orthopnea, or claudication. No hosp. No bleeding issues. Compliant with meds. Does not have CPAP, was told he did not need CPAP by Dr. Manuel Colmenares.    1-29-15: as above, still having back pain issues. Was told he doesn't need CPAP machine by DR. Torres. Pt denies chest pain, dyspnea, dyspnea on exertion, change in exercise capacity, fatigue,  nausea, vomiting, diarrhea, constipation, motor weakness, insomnia, weight loss, syncope, dizziness, lightheadedness, palpitations, PND, orthopnea, or claudication.' no recent hospitalizations. No bleeding issues. S/p back surgery with DR. Sara Glasgow    10-13-15: as above, doing well ovearall. No change in meds. No hospitalizations. Needs to follow up with Dr. Manuel Colmenares for CPAP/DUARTE. On coumadin, no bleeding issues. Pt denies chest pain, dyspnea, dyspnea on exertion, change in exercise capacity, fatigue,  nausea, vomiting, diarrhea, constipation, motor weakness, insomnia, weight loss, syncope, dizziness, lightheadedness, palpitations, PND, orthopnea, or claudication. Having a hard time losing weight. 11-3-15: as above, s/p ECho with EF of 50%, mild LVH, moderate biatrial enlargement, normal RVSP. Compliant with med. No recent hosptializations. BP is under control. 6-16-16: as above, doing welll overall. Did have some URI type symptoms. No hospitalizations. Remains on coumadin and no bleeding issues. Has not been able to obtain CPAP yet from Dr. Manuel Colmenares. Will need to follow up. Diet and exercise could be better.  Pt denies chest pain, dyspnea, dyspnea on exertion, change in exercise capacity, fatigue,  nausea, vomiting, diarrhea, constipation, motor weakness, insomnia, weight loss, syncope, dizziness, lightheadedness, palpitations, PND, orthopnea,. BP and HR are good. Lipid profile is good. 1-19-17: Pt denies chest pain, dyspnea, dyspnea on exertion, change in exercise capacity, fatigue,  nausea, vomiting, diarrhea, constipation, motor weakness, insomnia, weight loss, syncope, dizziness, lightheadedness, palpitations, PND, orthopnea, or claudication. No nitro use. BP and hr are good. CAD is stable. No LE discoloration or ulcers. No LE edema. No CHF type symptoms. Lipid profile is normal.  DUARTE is under control. No issues with Afibb at this time, no bleeding issues on coumadin. 7-20-17: Pt denies chest pain, dyspnea, dyspnea on exertion, change in exercise capacity, fatigue,  nausea, vomiting, diarrhea, constipation, motor weakness, insomnia, weight loss, syncope, dizziness, lightheadedness, palpitations, PND, orthopnea, or claudication. No nitro use. BP and hr are good. CAD is stable. No LE discoloration or ulcers. No LE edema. No CHF type symptoms. Lipid profile is normal.  No bleeding issues on [address]. Compliant with meds. No hosp. Compliant with CPAP machine. 1-18-18:  States he had left LE ulvers/wounds that have now healed. Was following with [facility] dr Tonya Car. 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 S/p LE venous dupplex US which was negative for DVT and + for right GSV reflux. Remain on coumadin. afibb is under control. Pt denies chest pain, dyspnea, dyspnea on exertion, change in exercise capacity, fatigue,  nausea, vomiting, diarrhea, constipation, motor weakness, insomnia, weight loss, syncope, dizziness, lightheadedness, palpitations, PND, orthopnea. Does have LE edema b/l. Compliant with CPAP machine. 7-17-18: s/p ECHO with EF of 50%, mild LVH, mild RVE, RVSP of 16mmHg, mod LAE.  Compliant with CPAP at night. LE wounds healed up.states he does not have DM. On [address] [place] [address] for chronic Afibb, INR is ok. No bleeding issues. Pt denies chest pain, dyspnea, dyspnea on exertion, change in exercise capacity, fatigue,  nausea, vomiting, diarrhea, constipation, motor weakness, insomnia, weight loss, syncope, dizziness, lightheadedness, palpitations, PND, orthopnea, or claudication. No nitro use. BP and hr are good. CAD is stable. No LE discoloration or ulcers. No LE edema. No CHF type symptoms. Lipid profile is normal. No recent hospitalization. No change in meds. LDL is 80. EKG with afibb at 79bpm.     1-15-19: needs venous ablation with dr. Radha Cruz. Doing well overall. Pt denies chest pain, dyspnea, dyspnea on exertion, change in exercise capacity, fatigue,  nausea, vomiting, diarrhea, constipation, motor weakness, insomnia, weight loss, syncope, dizziness, lightheadedness, palpitations, PND, orthopnea, or claudication. No nitro use. BP and hr are good. CAD is stable. No LE discoloration or ulcers. No LE edema. No CHF type symptoms. Lipid profile is normal. No recent hospitalization. No change in meds. On [address] [place] [address] for chronic Afibb, INR is ok EKG in afibb at 92bpm.       10-10-19: EKG with afib, CVR, has hx of chronic afib, on [address] [place] [address]. Pt denies chest pain, dyspnea, dyspnea on exertion, change in exercise capacity, fatigue,  nausea, vomiting, diarrhea, constipation, motor weakness, insomnia, weight loss, syncope, dizziness, lightheadedness, palpitations, PND, orthopnea, or claudication. No nitro use. BP and hr are good. CAD is stable. No LE discoloration or ulcers. No LE edema. No CHF type symptoms. Lipid profile is normal. No recent hospitalization. No change in meds. States he feels great. Hx of DUARTE on cpap.     10/13/2020: Patient with history of chronic atrial fibrillation on oral anticoagulation. He denies any bleeding issues. He denies any lightheadedness, dizziness or syncope. He is compliant with his medications.   No recent hospitalizations. Patient with history of DUARTE and he is compliant with his CPAP machine. States he does not have diabetes he is not on any long-term insulin use. Hemoglobin A1c is 6 as of May 2019. No change in his cardiac medications. Blood pressure is 120/80 heart rate of 67 bpm.  His LDL is 50, total cholesterol 102. Triglycerides 114. EKG with atrial fibrillation at 67 bpm.    4-20-21: Patient is doing well overall. He denies any angina or CHF type symptoms. Patient with history of chronic atrial fibrillation on oral anticoagulation. He denies any recent hospitalizations. No bleeding issues. Patient with history of obstructive sleep apnea and he is compliant with his CPAP machine. Blood pressure is 130/80 heart rate of 81 bpm today. He denies any lightheadedness dizziness palpitations or passing out. Lipid profile is okay per patient. EKG today with A. fib at a heart rate of 81 bpm.    1-18-22: patient with recent hospitalization for GIB/PUD  2 weeks ago at [facility], coumadin was stopped. S/p echo with EF of 50%. does have some LE edema and was placed on diuretics/torsemide. Patient with history of chronic atrial fibrillation. 3-22-22: Patient was cleared by GI to resume his oral anticoagulation. He is back on Coumadin. Patient with history of GI bleed/possible disease patient with history of chronic atrial fibrillation. Gan have some low extremity edema he is on diuretics  Pt denies chest pain,  nausea, vomiting, diarrhea, constipation, motor weakness, insomnia, weight loss, syncope, dizziness, lightheadedness, palpitations, PND, orthopnea, or claudication. Labs with PCP.    EKg with Meredith of 75b, at 75bpm.       Patient Active Problem List   Diagnosis    Hypertension    Atrial fibrillation ([facility]    Hyperlipidemia    COPD (chronic obstructive pulmonary disease) (HCC)    Benign prostatic hyperplasia    CAD (coronary artery disease)    Insomnia    DUARTE (obstructive sleep apnea)    Morbid obesity with BMI of 40.0-44.9, adult ([facility]    Type 2 diabetes mellitus without complication, without long-term current use of insulin (HCC)    Edema of right lower leg due to peripheral venous insufficiency    Calculi, ureter    Congenital cystic kidney disease    Acute idiopathic gout of right foot    Lymphedema of both lower extremities    Benign essential hypertension    Idiopathic gout       Current Outpatient Medications   Medication Sig Dispense Refill    pantoprazole (PROTONIX) 40 MG tablet Take by mouth      spironolactone (ALDACTONE) 25 MG tablet Take 1 tablet by mouth daily      torsemide (DEMADEX) 20 MG tablet Take by mouth      warfarin (COUMADIN) 5 MG tablet TAKE ONE AND ONE-HALF TABLETS DAILY, ADJUSTING AS NEEDED      potassium chloride (KLOR-CON M) 10 MEQ extended release tablet TAKE 1 TABLET ONCE DAILY 90 tablet 3    finasteride (PROSCAR) 5 MG tablet TAKE 1 TABLET ONCE DAILY 90 tablet 3    zolpidem (AMBIEN) 5 MG tablet Take 5 mg by mouth nightly as needed for Sleep.  umeclidinium-vilanterol (ANORO ELLIPTA) 62.5-25 MCG/INH AEPB inhaler Inhale 1 puff into the lungs daily 3 each 3    PROAIR  (90 Base) MCG/ACT inhaler INHALE 2 PUFFS EVERY 4 HOURS AS NEEDED  2    allopurinol (ZYLOPRIM) 300 MG tablet Take 1 tablet by mouth daily 90 tablet 3    terazosin (HYTRIN) 10 MG capsule TAKE 1 CAPSULE BY MOUTH NIGHTLY 90 capsule 3    simvastatin (ZOCOR) 20 MG tablet TAKE 1 TABLET BY MOUTH DAILY AT BEDTIME 90 tablet 3    CPAP Machine MISC by Does not apply route      latanoprost (XALATAN) 0.005 % ophthalmic solution       Vitamin D (CHOLECALCIFEROL) 1000 UNITS CAPS capsule Take 1,000 Units by mouth. Every friday        No current facility-administered medications for this visit.        ALLERGIES: Colcrys [colchicine]    Review of Systems:  General ROS: negative  Psychological ROS: negative  Hematological and Lymphatic ROS: No history of blood clots or bleeding disorder. Respiratory ROS: no cough, shortness of breath, or wheezing  Cardiovascular ROS: no chest pain or dyspnea on exertion  Gastrointestinal ROS: no abdominal pain, change in bowel habits, or black or bloody stools  Genito-Urinary ROS: no dysuria, trouble voiding, or hematuria  Musculoskeletal ROS: negative  Neurological ROS: no TIA or stroke symptoms  Dermatological ROS: negative    VITALS:  Blood pressure 138/64, pulse 83, height 5' 10\" (1.778 m), weight (!) 319 lb (144.7 kg), SpO2 95 %. Body mass index is 45.77 kg/m². Physical Examination:  General appearance - alert, well appearing, and in no distress  Mental status - alert, oriented to person, place, and time  Neck - Neck is supple, no JVD or carotid bruits. No thyromegaly or adenopathy.    Chest - clear to auscultation, no wheezes, rales or rhonchi, symmetric air entry  Heart - normal rate, regular rhythm, normal S1, S2, no murmurs, rubs, clicks or gallops  Abdomen - soft, nontender, nondistended, no masses or organomegaly  Neurological - alert, oriented, normal speech, no focal findings or movement disorder noted  Extremities - peripheral pulses normal, no pedal edema, no clubbing or cyanosis  Skin - normal coloration and turgor, no rashes, no suspicious skin lesions noted    LABS:    Chemistry        Component Value Date/Time     05/17/2019 0900    K 3.7 05/17/2019 0900     05/17/2019 0900    CO2 24 10/22/2018 0934    BUN 11 10/22/2018 0934    CREATININE 1.08 05/17/2019 0900        Component Value Date/Time    CALCIUM 9.0 05/17/2019 0900    ALKPHOS 95 05/17/2019 0900    AST 19 05/17/2019 0900    ALT 20 05/17/2019 0900    BILITOT 0.6 05/17/2019 0900            Lab Results   Component Value Date    WBC 7.8 05/17/2019    HGB 12.5 (L) 05/17/2019    HCT 38.6 (L) 05/17/2019     05/17/2019     05/17/2019       No components found for: CHLPL  Lab Results   Component Value Date    TRIG 114 05/17/2019    TRIG 100 10/22/2018 TRIG 125 05/14/2018     Lab Results   Component Value Date    HDL 29.0 (A) 05/17/2019    HDL 37 (L) 10/22/2018    HDL 34 (L) 05/14/2018     Lab Results   Component Value Date    LDLCALC 49 10/22/2018    [address] 80 05/14/2018    [address] 73 11/14/2017     Lab Results   Component Value Date    LABVLDL 23 05/17/2019       EKG: afibb, old anteroseptal Infarct. 66bpm.     ASSESSMENT:     Diagnosis Orders   1. Atrial fibrillation, unspecified type ([facility]  EKG 12 lead   2. Essential hypertension  EKG 12 lead   3. Primary hypertension     4. DUARTE (obstructive sleep apnea)     5. Mixed hyperlipidemia     6. Coronary artery disease involving native coronary artery of native heart without angina pectoris     7. Morbid obesity with BMI of 40.0-44.9, adult ([facility]     8. Benign essential hypertension           PLAN:       As always, aggressive risk factor modification is strongly recommended. We should adhere to the [address] VII guidelines for HTN management and the NCEP ATP III guidelines for LDL-C management. The nature of cardiac risk has been fully discussed with this patient. I have made him aware of his LDL target goal given his cardiovascular risk analysis. I have discussed the appropriate diet. The need for lifelong compliance in order to reduce risk is stressed. A regular exercise program is recommended to help achieve and maintain normal body weight, fitness and improve lipid balance. Continue medications at current doses. Patient back on Coumadin, was cleared by GI. INR goal of 2-3. Consider stress test in the near future if warranted by symptoms    Check EKG    Diet and exrecise. CPAP qhs. Check labs with PCP. Patient was advised and encouraged to check blood pressure at home or at a pharmacy, maintain a logbook, and also call us back if blood pressure are above the target ranges or if it is low. Patient clearly understands and agrees to the instructions.      We will need to continue to monitor muscle and liver enzymes, BUN, CR, and electrolytes. Details of medical condition explained and patient was warned about adverse consequences of uncontrolled medical conditions and possible side effects of prescribed medications. Patient was advised to go to the ER if he starts experiencing adverse effects of the medications. patient was instructed to call us back or go to nearby emergency room immediately if symptoms get worse or do not improve. Thank you for allowing me to participate in the care of your patient, please don't hesitate to contact me if you have any further questions.

## 2022-09-27 ENCOUNTER — TELEPHONE (OUTPATIENT)
Dept: CARDIOLOGY CLINIC | Age: 82
End: 2022-09-27

## 2023-01-01 ENCOUNTER — TELEPHONE (OUTPATIENT)
Dept: PRIMARY CARE | Facility: CLINIC | Age: 83
End: 2023-01-01
Payer: MEDICARE

## 2023-01-01 ENCOUNTER — TELEMEDICINE (OUTPATIENT)
Dept: PHARMACY | Facility: HOSPITAL | Age: 83
End: 2023-01-01
Payer: MEDICARE

## 2023-01-01 ENCOUNTER — TELEPHONE (OUTPATIENT)
Dept: PRIMARY CARE | Facility: CLINIC | Age: 83
End: 2023-01-01

## 2023-01-01 ENCOUNTER — DOCUMENTATION (OUTPATIENT)
Dept: PRIMARY CARE | Facility: CLINIC | Age: 83
End: 2023-01-01
Payer: MEDICARE

## 2023-01-01 ENCOUNTER — NURSING HOME VISIT (OUTPATIENT)
Dept: PRIMARY CARE | Facility: CLINIC | Age: 83
End: 2023-01-01
Payer: MEDICARE

## 2023-01-01 ENCOUNTER — OFFICE VISIT (OUTPATIENT)
Dept: PRIMARY CARE | Facility: CLINIC | Age: 83
End: 2023-01-01
Payer: MEDICARE

## 2023-01-01 ENCOUNTER — APPOINTMENT (OUTPATIENT)
Dept: GASTROENTEROLOGY | Facility: CLINIC | Age: 83
End: 2023-01-01
Payer: MEDICARE

## 2023-01-01 ENCOUNTER — PHARMACY VISIT (OUTPATIENT)
Dept: PHARMACY | Facility: CLINIC | Age: 83
End: 2023-01-01
Payer: COMMERCIAL

## 2023-01-01 ENCOUNTER — APPOINTMENT (OUTPATIENT)
Dept: PRIMARY CARE | Facility: CLINIC | Age: 83
End: 2023-01-01
Payer: MEDICARE

## 2023-01-01 ENCOUNTER — PATIENT MESSAGE (OUTPATIENT)
Dept: PRIMARY CARE | Facility: CLINIC | Age: 83
End: 2023-01-01

## 2023-01-01 ENCOUNTER — APPOINTMENT (OUTPATIENT)
Dept: PHARMACY | Facility: HOSPITAL | Age: 83
End: 2023-01-01
Payer: MEDICARE

## 2023-01-01 ENCOUNTER — OFFICE VISIT (OUTPATIENT)
Dept: GASTROENTEROLOGY | Facility: CLINIC | Age: 83
End: 2023-01-01
Payer: MEDICARE

## 2023-01-01 ENCOUNTER — NURSING HOME VISIT (OUTPATIENT)
Dept: POST ACUTE CARE | Facility: EXTERNAL LOCATION | Age: 83
End: 2023-01-01
Payer: MEDICARE

## 2023-01-01 ENCOUNTER — OFFICE VISIT (OUTPATIENT)
Dept: UROLOGY | Facility: CLINIC | Age: 83
End: 2023-01-01
Payer: MEDICARE

## 2023-01-01 ENCOUNTER — PATIENT OUTREACH (OUTPATIENT)
Dept: PRIMARY CARE | Facility: CLINIC | Age: 83
End: 2023-01-01
Payer: MEDICARE

## 2023-01-01 ENCOUNTER — TELEPHONE (OUTPATIENT)
Dept: PHARMACY | Facility: HOSPITAL | Age: 83
End: 2023-01-01
Payer: MEDICARE

## 2023-01-01 ENCOUNTER — APPOINTMENT (OUTPATIENT)
Dept: UROLOGY | Facility: CLINIC | Age: 83
End: 2023-01-01
Payer: MEDICARE

## 2023-01-01 ENCOUNTER — PATIENT MESSAGE (OUTPATIENT)
Dept: PRIMARY CARE | Facility: CLINIC | Age: 83
End: 2023-01-01
Payer: MEDICARE

## 2023-01-01 VITALS
OXYGEN SATURATION: 92 % | TEMPERATURE: 96.4 F | DIASTOLIC BLOOD PRESSURE: 50 MMHG | HEART RATE: 91 BPM | BODY MASS INDEX: 41.03 KG/M2 | HEIGHT: 69 IN | WEIGHT: 277 LBS | SYSTOLIC BLOOD PRESSURE: 102 MMHG | RESPIRATION RATE: 16 BRPM

## 2023-01-01 VITALS
TEMPERATURE: 97.2 F | DIASTOLIC BLOOD PRESSURE: 66 MMHG | OXYGEN SATURATION: 97 % | HEART RATE: 64 BPM | SYSTOLIC BLOOD PRESSURE: 120 MMHG | RESPIRATION RATE: 14 BRPM

## 2023-01-01 VITALS
DIASTOLIC BLOOD PRESSURE: 64 MMHG | TEMPERATURE: 97 F | SYSTOLIC BLOOD PRESSURE: 122 MMHG | HEART RATE: 72 BPM | OXYGEN SATURATION: 95 % | WEIGHT: 285.4 LBS | BODY MASS INDEX: 42.27 KG/M2 | HEIGHT: 69 IN | RESPIRATION RATE: 16 BRPM

## 2023-01-01 VITALS
SYSTOLIC BLOOD PRESSURE: 100 MMHG | TEMPERATURE: 97.8 F | DIASTOLIC BLOOD PRESSURE: 52 MMHG | OXYGEN SATURATION: 98 % | WEIGHT: 292.4 LBS | HEIGHT: 69 IN | HEART RATE: 66 BPM | BODY MASS INDEX: 43.31 KG/M2

## 2023-01-01 VITALS
DIASTOLIC BLOOD PRESSURE: 72 MMHG | TEMPERATURE: 97.7 F | HEART RATE: 72 BPM | WEIGHT: 308.2 LBS | HEIGHT: 69 IN | BODY MASS INDEX: 45.65 KG/M2 | RESPIRATION RATE: 20 BRPM | SYSTOLIC BLOOD PRESSURE: 147 MMHG | OXYGEN SATURATION: 92 %

## 2023-01-01 VITALS
WEIGHT: 277 LBS | SYSTOLIC BLOOD PRESSURE: 124 MMHG | DIASTOLIC BLOOD PRESSURE: 64 MMHG | HEART RATE: 70 BPM | BODY MASS INDEX: 41.03 KG/M2 | HEIGHT: 69 IN

## 2023-01-01 DIAGNOSIS — L03.116 CELLULITIS OF LEFT LEG: ICD-10-CM

## 2023-01-01 DIAGNOSIS — N18.4 STAGE 4 CHRONIC KIDNEY DISEASE DUE TO DIABETES MELLITUS (MULTI): ICD-10-CM

## 2023-01-01 DIAGNOSIS — N40.1 BENIGN PROSTATIC HYPERPLASIA WITH INCOMPLETE BLADDER EMPTYING: ICD-10-CM

## 2023-01-01 DIAGNOSIS — J44.9 CHRONIC OBSTRUCTIVE PULMONARY DISEASE, UNSPECIFIED COPD TYPE (MULTI): Primary | ICD-10-CM

## 2023-01-01 DIAGNOSIS — E11.22 TYPE 2 DIABETES MELLITUS WITH STAGE 4 CHRONIC KIDNEY DISEASE, WITHOUT LONG-TERM CURRENT USE OF INSULIN (MULTI): ICD-10-CM

## 2023-01-01 DIAGNOSIS — K64.9 HEMORRHOIDS, UNSPECIFIED HEMORRHOID TYPE: ICD-10-CM

## 2023-01-01 DIAGNOSIS — I50.9 ACUTE ON CHRONIC CONGESTIVE HEART FAILURE, UNSPECIFIED HEART FAILURE TYPE (MULTI): ICD-10-CM

## 2023-01-01 DIAGNOSIS — J44.9 COPD, SEVERE (MULTI): ICD-10-CM

## 2023-01-01 DIAGNOSIS — G47.9 SLEEP DISTURBANCES: ICD-10-CM

## 2023-01-01 DIAGNOSIS — N18.31 TYPE 2 DIABETES MELLITUS WITH STAGE 3A CHRONIC KIDNEY DISEASE, WITHOUT LONG-TERM CURRENT USE OF INSULIN (MULTI): ICD-10-CM

## 2023-01-01 DIAGNOSIS — J44.9 CHRONIC OBSTRUCTIVE PULMONARY DISEASE, UNSPECIFIED COPD TYPE (MULTI): ICD-10-CM

## 2023-01-01 DIAGNOSIS — F51.01 PRIMARY INSOMNIA: ICD-10-CM

## 2023-01-01 DIAGNOSIS — I50.33 ACUTE ON CHRONIC HEART FAILURE WITH PRESERVED EJECTION FRACTION (MULTI): Primary | ICD-10-CM

## 2023-01-01 DIAGNOSIS — Z00.00 ROUTINE GENERAL MEDICAL EXAMINATION AT HEALTH CARE FACILITY: Primary | ICD-10-CM

## 2023-01-01 DIAGNOSIS — N18.9 ACUTE ON CHRONIC RENAL INSUFFICIENCY: ICD-10-CM

## 2023-01-01 DIAGNOSIS — N18.4 TYPE 2 DIABETES MELLITUS WITH STAGE 4 CHRONIC KIDNEY DISEASE, WITHOUT LONG-TERM CURRENT USE OF INSULIN (MULTI): ICD-10-CM

## 2023-01-01 DIAGNOSIS — I10 HTN (HYPERTENSION), BENIGN: ICD-10-CM

## 2023-01-01 DIAGNOSIS — E11.22 TYPE 2 DIABETES MELLITUS WITH STAGE 3A CHRONIC KIDNEY DISEASE, WITHOUT LONG-TERM CURRENT USE OF INSULIN (MULTI): ICD-10-CM

## 2023-01-01 DIAGNOSIS — I89.0 LYMPHEDEMA OF BOTH LOWER EXTREMITIES: ICD-10-CM

## 2023-01-01 DIAGNOSIS — G47.33 OSA (OBSTRUCTIVE SLEEP APNEA): Chronic | ICD-10-CM

## 2023-01-01 DIAGNOSIS — I48.20 CHRONIC ATRIAL FIBRILLATION (MULTI): ICD-10-CM

## 2023-01-01 DIAGNOSIS — R53.81 PHYSICAL DECONDITIONING: ICD-10-CM

## 2023-01-01 DIAGNOSIS — R10.84 GENERALIZED ABDOMINAL PAIN: Primary | ICD-10-CM

## 2023-01-01 DIAGNOSIS — N18.31 TYPE 2 DIABETES MELLITUS WITH STAGE 3A CHRONIC KIDNEY DISEASE, WITHOUT LONG-TERM CURRENT USE OF INSULIN (MULTI): Primary | ICD-10-CM

## 2023-01-01 DIAGNOSIS — L03.116 CELLULITIS OF LEFT LEG: Primary | ICD-10-CM

## 2023-01-01 DIAGNOSIS — D64.9 ANEMIA, UNSPECIFIED TYPE: ICD-10-CM

## 2023-01-01 DIAGNOSIS — E66.01 MORBID OBESITY (MULTI): ICD-10-CM

## 2023-01-01 DIAGNOSIS — N30.00 ACUTE CYSTITIS WITHOUT HEMATURIA: Primary | ICD-10-CM

## 2023-01-01 DIAGNOSIS — I25.10 ARTERIOSCLEROSIS OF CORONARY ARTERY: ICD-10-CM

## 2023-01-01 DIAGNOSIS — N30.00 ACUTE CYSTITIS WITHOUT HEMATURIA: ICD-10-CM

## 2023-01-01 DIAGNOSIS — N47.1 PHIMOSIS: ICD-10-CM

## 2023-01-01 DIAGNOSIS — R23.8 BLISTERS OF MULTIPLE SITES: ICD-10-CM

## 2023-01-01 DIAGNOSIS — I50.9 ACUTE ON CHRONIC CONGESTIVE HEART FAILURE, UNSPECIFIED HEART FAILURE TYPE (MULTI): Primary | ICD-10-CM

## 2023-01-01 DIAGNOSIS — E11.22 TYPE 2 DIABETES MELLITUS WITH STAGE 4 CHRONIC KIDNEY DISEASE, WITHOUT LONG-TERM CURRENT USE OF INSULIN (MULTI): Primary | ICD-10-CM

## 2023-01-01 DIAGNOSIS — E78.2 HYPERLIPIDEMIA, MIXED: ICD-10-CM

## 2023-01-01 DIAGNOSIS — G47.9 SLEEP DISTURBANCES: Primary | ICD-10-CM

## 2023-01-01 DIAGNOSIS — J44.9 COPD, SEVERE (MULTI): Primary | ICD-10-CM

## 2023-01-01 DIAGNOSIS — A41.9 SEPTIC SHOCK (MULTI): Primary | ICD-10-CM

## 2023-01-01 DIAGNOSIS — J40 BRONCHITIS: ICD-10-CM

## 2023-01-01 DIAGNOSIS — R19.5 CHANGE IN STOOL: ICD-10-CM

## 2023-01-01 DIAGNOSIS — M79.89 LEG SWELLING: ICD-10-CM

## 2023-01-01 DIAGNOSIS — J18.9 PNEUMONIA OF LEFT LOWER LOBE DUE TO INFECTIOUS ORGANISM: ICD-10-CM

## 2023-01-01 DIAGNOSIS — R82.90 ABNORMAL URINALYSIS: ICD-10-CM

## 2023-01-01 DIAGNOSIS — R25.1 TREMOR: ICD-10-CM

## 2023-01-01 DIAGNOSIS — N28.9 ACUTE ON CHRONIC RENAL INSUFFICIENCY: ICD-10-CM

## 2023-01-01 DIAGNOSIS — N18.4 STAGE 4 CHRONIC KIDNEY DISEASE (MULTI): ICD-10-CM

## 2023-01-01 DIAGNOSIS — A41.9 SEPSIS, DUE TO UNSPECIFIED ORGANISM, UNSPECIFIED WHETHER ACUTE ORGAN DYSFUNCTION PRESENT (MULTI): Primary | ICD-10-CM

## 2023-01-01 DIAGNOSIS — N18.9 ACUTE KIDNEY INJURY SUPERIMPOSED ON CKD (CMS-HCC): ICD-10-CM

## 2023-01-01 DIAGNOSIS — I50.32 CHRONIC DIASTOLIC HEART FAILURE (MULTI): ICD-10-CM

## 2023-01-01 DIAGNOSIS — R65.21 SEPTIC SHOCK (MULTI): Primary | ICD-10-CM

## 2023-01-01 DIAGNOSIS — I89.0 LYMPHEDEMA OF BOTH LOWER EXTREMITIES: Primary | ICD-10-CM

## 2023-01-01 DIAGNOSIS — M1A.0710 IDIOPATHIC CHRONIC GOUT OF RIGHT FOOT WITHOUT TOPHUS: ICD-10-CM

## 2023-01-01 DIAGNOSIS — F33.41 RECURRENT MAJOR DEPRESSIVE DISORDER, IN PARTIAL REMISSION (CMS-HCC): ICD-10-CM

## 2023-01-01 DIAGNOSIS — Z00.00 HEALTHCARE MAINTENANCE: ICD-10-CM

## 2023-01-01 DIAGNOSIS — K21.9 GASTROESOPHAGEAL REFLUX DISEASE, UNSPECIFIED WHETHER ESOPHAGITIS PRESENT: ICD-10-CM

## 2023-01-01 DIAGNOSIS — E87.1 HYPONATREMIA: ICD-10-CM

## 2023-01-01 DIAGNOSIS — K31.1 GASTRIC OUTLET OBSTRUCTION (HHS-HCC): Primary | ICD-10-CM

## 2023-01-01 DIAGNOSIS — D63.1 ANEMIA DUE TO STAGE 4 CHRONIC KIDNEY DISEASE (MULTI): ICD-10-CM

## 2023-01-01 DIAGNOSIS — J18.9 COMMUNITY ACQUIRED PNEUMONIA, UNSPECIFIED LATERALITY: Primary | ICD-10-CM

## 2023-01-01 DIAGNOSIS — E66.9 OBESITY (BMI 35.0-39.9 WITHOUT COMORBIDITY): ICD-10-CM

## 2023-01-01 DIAGNOSIS — E11.22 TYPE 2 DIABETES MELLITUS WITH STAGE 3A CHRONIC KIDNEY DISEASE, WITHOUT LONG-TERM CURRENT USE OF INSULIN (MULTI): Primary | ICD-10-CM

## 2023-01-01 DIAGNOSIS — E11.22 STAGE 4 CHRONIC KIDNEY DISEASE DUE TO DIABETES MELLITUS (MULTI): ICD-10-CM

## 2023-01-01 DIAGNOSIS — N18.4 TYPE 2 DIABETES MELLITUS WITH STAGE 4 CHRONIC KIDNEY DISEASE, WITHOUT LONG-TERM CURRENT USE OF INSULIN (MULTI): Primary | ICD-10-CM

## 2023-01-01 DIAGNOSIS — I11.0 HYPERTENSIVE HEART DISEASE WITH HEART FAILURE (MULTI): ICD-10-CM

## 2023-01-01 DIAGNOSIS — R21 RASH: ICD-10-CM

## 2023-01-01 DIAGNOSIS — J18.9 PNEUMONIA DUE TO INFECTIOUS ORGANISM, UNSPECIFIED LATERALITY, UNSPECIFIED PART OF LUNG: ICD-10-CM

## 2023-01-01 DIAGNOSIS — N17.9 ACUTE KIDNEY INJURY SUPERIMPOSED ON CKD (CMS-HCC): ICD-10-CM

## 2023-01-01 DIAGNOSIS — E11.622 TYPE 2 DIABETES MELLITUS WITH OTHER SKIN ULCER (CODE) (MULTI): ICD-10-CM

## 2023-01-01 DIAGNOSIS — I50.32 CHRONIC HEART FAILURE WITH PRESERVED EJECTION FRACTION (MULTI): ICD-10-CM

## 2023-01-01 DIAGNOSIS — R39.14 BENIGN PROSTATIC HYPERPLASIA WITH INCOMPLETE BLADDER EMPTYING: ICD-10-CM

## 2023-01-01 DIAGNOSIS — K57.10 DUODENAL DIVERTICULUM: ICD-10-CM

## 2023-01-01 DIAGNOSIS — N18.4 ANEMIA DUE TO STAGE 4 CHRONIC KIDNEY DISEASE (MULTI): ICD-10-CM

## 2023-01-01 DIAGNOSIS — J18.9 PNEUMONIA OF BOTH LOWER LOBES DUE TO INFECTIOUS ORGANISM: Primary | ICD-10-CM

## 2023-01-01 DIAGNOSIS — Q27.30 AVM (ARTERIOVENOUS MALFORMATION) (HHS-HCC): ICD-10-CM

## 2023-01-01 DIAGNOSIS — I48.20 CHRONIC ATRIAL FIBRILLATION (MULTI): Primary | ICD-10-CM

## 2023-01-01 LAB
6-ACETYLMORPHINE: <25 NG/ML
7-AMINOCLONAZEPAM: <25 NG/ML
ALANINE AMINOTRANSFERASE (SGPT) (U/L) IN SER/PLAS: 12 U/L (ref 10–52)
ALBUMIN (G/DL) IN SER/PLAS: 3.7 G/DL (ref 3.4–5)
ALBUMIN (MG/L) IN URINE: 17.7 MG/L
ALBUMIN/CREATININE (UG/MG) IN URINE: 25.2 UG/MG CRT (ref 0–30)
ALKALINE PHOSPHATASE (U/L) IN SER/PLAS: 76 U/L (ref 33–136)
ALPHA-HYDROXYALPRAZOLAM: <25 NG/ML
ALPHA-HYDROXYMIDAZOLAM: <25 NG/ML
ALPRAZOLAM: <25 NG/ML
AMPHETAMINE (PRESENCE) IN URINE BY SCREEN METHOD: ABNORMAL
ANION GAP IN SER/PLAS: 11 MMOL/L (ref 10–20)
ANION GAP IN SER/PLAS: 16 MMOL/L (ref 10–20)
APPEARANCE, URINE: ABNORMAL
ASPARTATE AMINOTRANSFERASE (SGOT) (U/L) IN SER/PLAS: 15 U/L (ref 9–39)
BACTERIA UR CULT: NORMAL
BARBITURATES PRESENCE IN URINE BY SCREEN METHOD: ABNORMAL
BASOPHILS (10*3/UL) IN BLOOD BY AUTOMATED COUNT: 0.07 X10E9/L (ref 0–0.1)
BASOPHILS/100 LEUKOCYTES IN BLOOD BY AUTOMATED COUNT: 0.6 % (ref 0–2)
BILIRUBIN TOTAL (MG/DL) IN SER/PLAS: 0.4 MG/DL (ref 0–1.2)
BILIRUBIN, URINE: NEGATIVE
BLOOD, URINE: NEGATIVE
BUDDING YEAST, URINE: 6 /HPF
CALCIDIOL (25 OH VITAMIN D3) (NG/ML) IN SER/PLAS: 38 NG/ML
CALCIUM (MG/DL) IN SER/PLAS: 9.3 MG/DL (ref 8.6–10.3)
CALCIUM (MG/DL) IN SER/PLAS: 9.5 MG/DL (ref 8.6–10.3)
CANNABINOIDS IN URINE BY SCREEN METHOD: ABNORMAL
CARBON DIOXIDE, TOTAL (MMOL/L) IN SER/PLAS: 27 MMOL/L (ref 21–32)
CARBON DIOXIDE, TOTAL (MMOL/L) IN SER/PLAS: 29 MMOL/L (ref 21–32)
CHLORDIAZEPOXIDE: <25 NG/ML
CHLORIDE (MMOL/L) IN SER/PLAS: 103 MMOL/L (ref 98–107)
CHLORIDE (MMOL/L) IN SER/PLAS: 98 MMOL/L (ref 98–107)
CHOLESTEROL (MG/DL) IN SER/PLAS: 118 MG/DL (ref 0–199)
CHOLESTEROL IN HDL (MG/DL) IN SER/PLAS: 29.5 MG/DL
CHOLESTEROL/HDL RATIO: 4
CLONAZEPAM: <25 NG/ML
COCAINE (PRESENCE) IN URINE BY SCREEN METHOD: ABNORMAL
CODEINE: <50 NG/ML
COLOR, URINE: YELLOW
CREATINE, URINE FOR DRUG: 31.2 MG/DL
CREATININE (MG/DL) IN SER/PLAS: 1.76 MG/DL (ref 0.5–1.3)
CREATININE (MG/DL) IN SER/PLAS: 3.03 MG/DL (ref 0.5–1.3)
CREATININE (MG/DL) IN URINE: 61.1 MG/DL (ref 20–370)
CREATININE (MG/DL) IN URINE: 70.3 MG/DL (ref 20–370)
DIAZEPAM: <25 NG/ML
DRUG SCREEN COMMENT URINE: ABNORMAL
EDDP: <25 NG/ML
EOSINOPHILS (10*3/UL) IN BLOOD BY AUTOMATED COUNT: 0.34 X10E9/L (ref 0–0.4)
EOSINOPHILS/100 LEUKOCYTES IN BLOOD BY AUTOMATED COUNT: 3 % (ref 0–6)
ERYTHROCYTE DISTRIBUTION WIDTH (RATIO) BY AUTOMATED COUNT: 17.3 % (ref 11.5–14.5)
ERYTHROCYTE MEAN CORPUSCULAR HEMOGLOBIN CONCENTRATION (G/DL) BY AUTOMATED: 32.4 G/DL (ref 32–36)
ERYTHROCYTE MEAN CORPUSCULAR VOLUME (FL) BY AUTOMATED COUNT: 103 FL (ref 80–100)
ERYTHROCYTES (10*6/UL) IN BLOOD BY AUTOMATED COUNT: 2.64 X10E12/L (ref 4.5–5.9)
ESTIMATED AVERAGE GLUCOSE FOR HBA1C: 163 MG/DL
FENTANYL CONFIRMATION, URINE: <2.5 NG/ML
GFR MALE: 20 ML/MIN/1.73M2
GFR MALE: 38 ML/MIN/1.73M2
GLUCOSE (MG/DL) IN SER/PLAS: 139 MG/DL (ref 74–99)
GLUCOSE (MG/DL) IN SER/PLAS: 154 MG/DL (ref 74–99)
GLUCOSE, URINE: ABNORMAL MG/DL
HEMATOCRIT (%) IN BLOOD BY AUTOMATED COUNT: 27.2 % (ref 41–52)
HEMOGLOBIN (G/DL) IN BLOOD: 8.8 G/DL (ref 13.5–17.5)
HEMOGLOBIN A1C/HEMOGLOBIN TOTAL IN BLOOD: 7.3 %
HYDROCODONE: <25 NG/ML
HYDROMORPHONE: <25 NG/ML
IMMATURE GRANULOCYTES/100 LEUKOCYTES IN BLOOD BY AUTOMATED COUNT: 1 % (ref 0–0.9)
KETONES, URINE: NEGATIVE MG/DL
LDL: 63 MG/DL (ref 0–99)
LEUKOCYTE ESTERASE, URINE: ABNORMAL
LEUKOCYTES (10*3/UL) IN BLOOD BY AUTOMATED COUNT: 11.2 X10E9/L (ref 4.4–11.3)
LORAZEPAM: <25 NG/ML
LYMPHOCYTES (10*3/UL) IN BLOOD BY AUTOMATED COUNT: 1.22 X10E9/L (ref 0.8–3)
LYMPHOCYTES/100 LEUKOCYTES IN BLOOD BY AUTOMATED COUNT: 10.9 % (ref 13–44)
METHADONE CONFIRMATION,URINE: <25 NG/ML
MIDAZOLAM: <25 NG/ML
MONOCYTES (10*3/UL) IN BLOOD BY AUTOMATED COUNT: 0.57 X10E9/L (ref 0.05–0.8)
MONOCYTES/100 LEUKOCYTES IN BLOOD BY AUTOMATED COUNT: 5.1 % (ref 2–10)
MORPHINE URINE: <50 NG/ML
NEUTROPHILS (10*3/UL) IN BLOOD BY AUTOMATED COUNT: 8.88 X10E9/L (ref 1.6–5.5)
NEUTROPHILS/100 LEUKOCYTES IN BLOOD BY AUTOMATED COUNT: 79.4 % (ref 40–80)
NITRITE, URINE: NEGATIVE
NORDIAZEPAM: <25 NG/ML
NORFENTANYL: <2.5 NG/ML
NORHYDROCODONE: <25 NG/ML
NOROXYCODONE: <25 NG/ML
O-DESMETHYLTRAMADOL: <50 NG/ML
OXAZEPAM: <25 NG/ML
OXYCODONE: <25 NG/ML
OXYMORPHONE: <25 NG/ML
PARATHYRIN INTACT (PG/ML) IN SER/PLAS: 86.5 PG/ML (ref 18.5–88)
PH, URINE: 5 (ref 5–8)
PHENCYCLIDINE (PRESENCE) IN URINE BY SCREEN METHOD: ABNORMAL
PHOSPHATE (MG/DL) IN SER/PLAS: 3.7 MG/DL (ref 2.5–4.9)
PLATELETS (10*3/UL) IN BLOOD AUTOMATED COUNT: 209 X10E9/L (ref 150–450)
POC APPEARANCE, URINE: CLEAR
POC BILIRUBIN, URINE: NEGATIVE
POC BLOOD, URINE: NEGATIVE
POC COLOR, URINE: YELLOW
POC GLUCOSE, URINE: ABNORMAL MG/DL
POC KETONES, URINE: NEGATIVE MG/DL
POC LEUKOCYTES, URINE: ABNORMAL
POC NITRITE,URINE: NEGATIVE
POC PH, URINE: 5.5 PH
POC PROTEIN, URINE: NEGATIVE MG/DL
POC SPECIFIC GRAVITY, URINE: 1.01
POC UROBILINOGEN, URINE: 0.2 EU/DL
POTASSIUM (MMOL/L) IN SER/PLAS: 3.9 MMOL/L (ref 3.5–5.3)
POTASSIUM (MMOL/L) IN SER/PLAS: 4.9 MMOL/L (ref 3.5–5.3)
PROTEIN (MG/DL) IN URINE: 17 MG/DL (ref 5–25)
PROTEIN TOTAL: 6.9 G/DL (ref 6.4–8.2)
PROTEIN, URINE: NEGATIVE MG/DL
PROTEIN/CREATININE (MG/MG) IN URINE: 0.28 MG/MG CREAT (ref 0–0.17)
RBC, URINE: ABNORMAL /HPF (ref 0–5)
SODIUM (MMOL/L) IN SER/PLAS: 136 MMOL/L (ref 136–145)
SODIUM (MMOL/L) IN SER/PLAS: 139 MMOL/L (ref 136–145)
SPECIFIC GRAVITY, URINE: 1.01 (ref 1–1.03)
SQUAMOUS EPITHELIAL CELLS, URINE: 1 /HPF
TEMAZEPAM: <25 NG/ML
TRAMADOL: <50 NG/ML
TRIGLYCERIDE (MG/DL) IN SER/PLAS: 128 MG/DL (ref 0–149)
URATE (MG/DL) IN SER/PLAS: 5 MG/DL (ref 4–7.5)
UREA NITROGEN (MG/DL) IN SER/PLAS: 43 MG/DL (ref 6–23)
UREA NITROGEN (MG/DL) IN SER/PLAS: 68 MG/DL (ref 6–23)
UROBILINOGEN, URINE: <2 MG/DL (ref 0–1.9)
VLDL: 26 MG/DL (ref 0–40)
WBC, URINE: ABNORMAL /HPF (ref 0–5)
ZOLPIDEM METABOLITE (ZCA): 293 NG/ML
ZOLPIDEM: 83 NG/ML

## 2023-01-01 PROCEDURE — 1170F FXNL STATUS ASSESSED: CPT | Performed by: FAMILY MEDICINE

## 2023-01-01 PROCEDURE — 99309 SBSQ NF CARE MODERATE MDM 30: CPT | Performed by: NURSE PRACTITIONER

## 2023-01-01 PROCEDURE — 87086 URINE CULTURE/COLONY COUNT: CPT

## 2023-01-01 PROCEDURE — 1036F TOBACCO NON-USER: CPT | Performed by: STUDENT IN AN ORGANIZED HEALTH CARE EDUCATION/TRAINING PROGRAM

## 2023-01-01 PROCEDURE — 82043 UR ALBUMIN QUANTITATIVE: CPT

## 2023-01-01 PROCEDURE — 80061 LIPID PANEL: CPT

## 2023-01-01 PROCEDURE — 3078F DIAST BP <80 MM HG: CPT | Performed by: FAMILY MEDICINE

## 2023-01-01 PROCEDURE — 1160F RVW MEDS BY RX/DR IN RCRD: CPT | Performed by: INTERNAL MEDICINE

## 2023-01-01 PROCEDURE — 99214 OFFICE O/P EST MOD 30 MIN: CPT | Performed by: FAMILY MEDICINE

## 2023-01-01 PROCEDURE — 1036F TOBACCO NON-USER: CPT | Performed by: FAMILY MEDICINE

## 2023-01-01 PROCEDURE — 1159F MED LIST DOCD IN RCRD: CPT | Performed by: FAMILY MEDICINE

## 2023-01-01 PROCEDURE — 80361 OPIATES 1 OR MORE: CPT

## 2023-01-01 PROCEDURE — 99305 1ST NF CARE MODERATE MDM 35: CPT | Performed by: FAMILY MEDICINE

## 2023-01-01 PROCEDURE — 80368 SEDATIVE HYPNOTICS: CPT

## 2023-01-01 PROCEDURE — 99215 OFFICE O/P EST HI 40 MIN: CPT | Performed by: INTERNAL MEDICINE

## 2023-01-01 PROCEDURE — 80354 DRUG SCREENING FENTANYL: CPT

## 2023-01-01 PROCEDURE — 99495 TRANSJ CARE MGMT MOD F2F 14D: CPT | Performed by: FAMILY MEDICINE

## 2023-01-01 PROCEDURE — 80346 BENZODIAZEPINES1-12: CPT

## 2023-01-01 PROCEDURE — RXMED WILLOW AMBULATORY MEDICATION CHARGE

## 2023-01-01 PROCEDURE — 1159F MED LIST DOCD IN RCRD: CPT | Performed by: INTERNAL MEDICINE

## 2023-01-01 PROCEDURE — 1159F MED LIST DOCD IN RCRD: CPT | Performed by: STUDENT IN AN ORGANIZED HEALTH CARE EDUCATION/TRAINING PROGRAM

## 2023-01-01 PROCEDURE — G0439 PPPS, SUBSEQ VISIT: HCPCS | Performed by: FAMILY MEDICINE

## 2023-01-01 PROCEDURE — 1036F TOBACCO NON-USER: CPT | Performed by: INTERNAL MEDICINE

## 2023-01-01 PROCEDURE — 1160F RVW MEDS BY RX/DR IN RCRD: CPT | Performed by: FAMILY MEDICINE

## 2023-01-01 PROCEDURE — 99203 OFFICE O/P NEW LOW 30 MIN: CPT | Performed by: STUDENT IN AN ORGANIZED HEALTH CARE EDUCATION/TRAINING PROGRAM

## 2023-01-01 PROCEDURE — 80373 DRUG SCREENING TRAMADOL: CPT

## 2023-01-01 PROCEDURE — 83036 HEMOGLOBIN GLYCOSYLATED A1C: CPT

## 2023-01-01 PROCEDURE — 80365 DRUG SCREENING OXYCODONE: CPT

## 2023-01-01 PROCEDURE — 3074F SYST BP LT 130 MM HG: CPT | Performed by: FAMILY MEDICINE

## 2023-01-01 PROCEDURE — 80053 COMPREHEN METABOLIC PANEL: CPT

## 2023-01-01 PROCEDURE — 1160F RVW MEDS BY RX/DR IN RCRD: CPT | Performed by: STUDENT IN AN ORGANIZED HEALTH CARE EDUCATION/TRAINING PROGRAM

## 2023-01-01 PROCEDURE — 85025 COMPLETE CBC W/AUTO DIFF WBC: CPT

## 2023-01-01 PROCEDURE — 82570 ASSAY OF URINE CREATININE: CPT

## 2023-01-01 PROCEDURE — 81003 URINALYSIS AUTO W/O SCOPE: CPT | Performed by: STUDENT IN AN ORGANIZED HEALTH CARE EDUCATION/TRAINING PROGRAM

## 2023-01-01 PROCEDURE — 80358 DRUG SCREENING METHADONE: CPT

## 2023-01-01 PROCEDURE — 80307 DRUG TEST PRSMV CHEM ANLYZR: CPT

## 2023-01-01 RX ORDER — AZITHROMYCIN 250 MG/1
TABLET, FILM COATED ORAL
Qty: 6 TABLET | Refills: 0 | Status: SHIPPED | OUTPATIENT
Start: 2023-01-01 | End: 2023-01-01 | Stop reason: ALTCHOICE

## 2023-01-01 RX ORDER — PRAVASTATIN SODIUM 40 MG/1
1 TABLET ORAL DAILY
COMMUNITY
Start: 2023-01-01 | End: 2023-01-01 | Stop reason: SDUPTHER

## 2023-01-01 RX ORDER — FINASTERIDE 5 MG/1
5 TABLET, FILM COATED ORAL DAILY
Qty: 90 TABLET | Refills: 3 | Status: SHIPPED | OUTPATIENT
Start: 2023-01-01 | End: 2023-11-20

## 2023-01-01 RX ORDER — INSULIN LISPRO 100 [IU]/ML
INJECTION, SOLUTION INTRAVENOUS; SUBCUTANEOUS
Qty: 300 ML | Refills: 3 | Status: SHIPPED | OUTPATIENT
Start: 2023-01-01 | End: 2023-01-01

## 2023-01-01 RX ORDER — TORSEMIDE 100 MG/1
100 TABLET ORAL DAILY
Qty: 90 TABLET | Refills: 3 | Status: SHIPPED | OUTPATIENT
Start: 2023-01-01 | End: 2023-01-01 | Stop reason: ALTCHOICE

## 2023-01-01 RX ORDER — APIXABAN 5 MG/1
5 TABLET, FILM COATED ORAL 2 TIMES DAILY
Qty: 180 TABLET | Refills: 3 | Status: SHIPPED | OUTPATIENT
Start: 2023-01-01 | End: 2023-01-01 | Stop reason: SDUPTHER

## 2023-01-01 RX ORDER — LEVOFLOXACIN 750 MG/1
750 TABLET ORAL DAILY
COMMUNITY
Start: 2023-01-01 | End: 2023-01-01 | Stop reason: WASHOUT

## 2023-01-01 RX ORDER — SITAGLIPTIN 25 MG/1
25 TABLET, FILM COATED ORAL DAILY
Qty: 30 TABLET | Refills: 0 | Status: SHIPPED | OUTPATIENT
Start: 2023-01-01 | End: 2023-11-20

## 2023-01-01 RX ORDER — MONTELUKAST SODIUM 10 MG/1
1 TABLET ORAL EVERY EVENING
COMMUNITY
Start: 2022-01-01 | End: 2023-01-01 | Stop reason: SDUPTHER

## 2023-01-01 RX ORDER — TRAZODONE HYDROCHLORIDE 50 MG/1
50 TABLET ORAL NIGHTLY PRN
Qty: 30 TABLET | Refills: 0 | Status: SHIPPED | OUTPATIENT
Start: 2023-01-01 | End: 2023-01-01

## 2023-01-01 RX ORDER — MONTELUKAST SODIUM 10 MG/1
10 TABLET ORAL EVERY EVENING
Qty: 90 TABLET | Refills: 3 | Status: SHIPPED | OUTPATIENT
Start: 2023-01-01 | End: 2023-11-20

## 2023-01-01 RX ORDER — ACETAMINOPHEN 500 MG
2 TABLET ORAL 3 TIMES DAILY
COMMUNITY
Start: 2023-01-01

## 2023-01-01 RX ORDER — PANTOPRAZOLE SODIUM 40 MG/1
40 TABLET, DELAYED RELEASE ORAL
Qty: 90 TABLET | Refills: 3 | Status: SHIPPED | OUTPATIENT
Start: 2023-01-01 | End: 2023-01-01 | Stop reason: ALTCHOICE

## 2023-01-01 RX ORDER — DOXYCYCLINE 100 MG/1
100 CAPSULE ORAL 2 TIMES DAILY
Qty: 14 CAPSULE | Refills: 0 | Status: SHIPPED | OUTPATIENT
Start: 2023-01-01 | End: 2023-01-01

## 2023-01-01 RX ORDER — ALBUTEROL SULFATE 90 UG/1
2 AEROSOL, METERED RESPIRATORY (INHALATION) EVERY 6 HOURS PRN
COMMUNITY
Start: 2020-09-23

## 2023-01-01 RX ORDER — CEPHALEXIN 500 MG/1
500 CAPSULE ORAL 3 TIMES DAILY
Qty: 30 CAPSULE | Refills: 0 | Status: SHIPPED | OUTPATIENT
Start: 2023-01-01 | End: 2023-01-01

## 2023-01-01 RX ORDER — BUMETANIDE 2 MG/1
2 TABLET ORAL 2 TIMES DAILY
Qty: 180 TABLET | Refills: 3 | Status: SHIPPED | OUTPATIENT
Start: 2023-01-01 | End: 2023-01-01 | Stop reason: SDUPTHER

## 2023-01-01 RX ORDER — TRAZODONE HYDROCHLORIDE 50 MG/1
50 TABLET ORAL NIGHTLY PRN
Qty: 90 TABLET | Refills: 0 | Status: SHIPPED | OUTPATIENT
Start: 2023-01-01 | End: 2023-01-01

## 2023-01-01 RX ORDER — METOPROLOL TARTRATE 25 MG/1
TABLET, FILM COATED ORAL
Qty: 90 TABLET | Refills: 3 | Status: SHIPPED | OUTPATIENT
Start: 2023-01-01 | End: 2023-01-01 | Stop reason: WASHOUT

## 2023-01-01 RX ORDER — TERAZOSIN 10 MG/1
1 CAPSULE ORAL NIGHTLY
COMMUNITY
Start: 2019-10-17 | End: 2023-01-01 | Stop reason: SDUPTHER

## 2023-01-01 RX ORDER — IPRATROPIUM BROMIDE AND ALBUTEROL SULFATE 2.5; .5 MG/3ML; MG/3ML
SOLUTION RESPIRATORY (INHALATION)
Qty: 180 ML | Refills: 3 | Status: SHIPPED | OUTPATIENT
Start: 2023-01-01 | End: 2023-01-01 | Stop reason: SDUPTHER

## 2023-01-01 RX ORDER — INSULIN LISPRO 100 [IU]/ML
INJECTION, SOLUTION INTRAVENOUS; SUBCUTANEOUS
COMMUNITY
Start: 2022-01-01 | End: 2023-01-01 | Stop reason: SDUPTHER

## 2023-01-01 RX ORDER — ZOLPIDEM TARTRATE 10 MG/1
10 TABLET ORAL NIGHTLY PRN
Qty: 90 TABLET | Refills: 0 | Status: SHIPPED | OUTPATIENT
Start: 2023-01-01 | End: 2023-01-01 | Stop reason: DRUGHIGH

## 2023-01-01 RX ORDER — ALLOPURINOL 300 MG/1
1 TABLET ORAL DAILY
COMMUNITY
Start: 2019-05-02 | End: 2023-01-01 | Stop reason: SDUPTHER

## 2023-01-01 RX ORDER — TRAZODONE HYDROCHLORIDE 50 MG/1
50 TABLET ORAL NIGHTLY
Qty: 90 TABLET | Refills: 0 | Status: SHIPPED | OUTPATIENT
Start: 2023-01-01 | End: 2023-11-20

## 2023-01-01 RX ORDER — ALLOPURINOL 300 MG/1
300 TABLET ORAL DAILY
Qty: 90 TABLET | Refills: 3 | Status: SHIPPED | OUTPATIENT
Start: 2023-01-01 | End: 2023-11-20

## 2023-01-01 RX ORDER — OSTOMY SUPPLY
1 PASTE (GRAM) MISCELLANEOUS 2 TIMES DAILY PRN
Qty: 71 G | Refills: 2 | Status: SHIPPED | OUTPATIENT
Start: 2023-01-01 | End: 2023-01-01

## 2023-01-01 RX ORDER — UMECLIDINIUM BROMIDE AND VILANTEROL TRIFENATATE 62.5; 25 UG/1; UG/1
1 POWDER RESPIRATORY (INHALATION) DAILY
Qty: 3 EACH | Refills: 3 | Status: SHIPPED | OUTPATIENT
Start: 2023-01-01 | End: 2023-11-20

## 2023-01-01 RX ORDER — FINASTERIDE 5 MG/1
1 TABLET, FILM COATED ORAL DAILY
COMMUNITY
End: 2023-01-01 | Stop reason: SDUPTHER

## 2023-01-01 RX ORDER — AMLODIPINE BESYLATE 5 MG/1
TABLET ORAL
Qty: 90 TABLET | Refills: 3 | Status: SHIPPED | OUTPATIENT
Start: 2023-01-01 | End: 2023-01-01

## 2023-01-01 RX ORDER — BRIMONIDINE TARTRATE 1.5 MG/ML
1 SOLUTION/ DROPS OPHTHALMIC 3 TIMES DAILY
COMMUNITY

## 2023-01-01 RX ORDER — AMLODIPINE BESYLATE 5 MG/1
TABLET ORAL
COMMUNITY
Start: 2022-01-01 | End: 2023-01-01 | Stop reason: SDUPTHER

## 2023-01-01 RX ORDER — BUMETANIDE 2 MG/1
6 TABLET ORAL DAILY
Qty: 270 TABLET | Refills: 3 | Status: SHIPPED | OUTPATIENT
Start: 2023-01-01 | End: 2023-11-20

## 2023-01-01 RX ORDER — HYDROCHLOROTHIAZIDE 25 MG/1
1 TABLET ORAL DAILY
COMMUNITY
Start: 2022-01-01 | End: 2023-01-01 | Stop reason: SDUPTHER

## 2023-01-01 RX ORDER — ESCITALOPRAM OXALATE 10 MG/1
10 TABLET ORAL DAILY
COMMUNITY
End: 2023-01-01 | Stop reason: SDUPTHER

## 2023-01-01 RX ORDER — METOPROLOL TARTRATE 25 MG/1
TABLET, FILM COATED ORAL
COMMUNITY
Start: 2022-01-01 | End: 2023-01-01 | Stop reason: SDUPTHER

## 2023-01-01 RX ORDER — FUROSEMIDE 20 MG/1
TABLET ORAL
Qty: 45 TABLET | Refills: 3 | Status: SHIPPED | OUTPATIENT
Start: 2023-01-01 | End: 2023-01-01

## 2023-01-01 RX ORDER — SPIRONOLACTONE 50 MG/1
50 TABLET, FILM COATED ORAL DAILY
Qty: 90 TABLET | Refills: 3 | Status: SHIPPED | OUTPATIENT
Start: 2023-01-01 | End: 2023-01-01 | Stop reason: ALTCHOICE

## 2023-01-01 RX ORDER — IPRATROPIUM BROMIDE AND ALBUTEROL SULFATE 2.5; .5 MG/3ML; MG/3ML
SOLUTION RESPIRATORY (INHALATION)
COMMUNITY
Start: 2022-01-01 | End: 2023-01-01 | Stop reason: SDUPTHER

## 2023-01-01 RX ORDER — LANOLIN ALCOHOL/MO/W.PET/CERES
1 CREAM (GRAM) TOPICAL DAILY
COMMUNITY
Start: 2022-01-11

## 2023-01-01 RX ORDER — GABAPENTIN 100 MG/1
100 CAPSULE ORAL NIGHTLY
Qty: 90 CAPSULE | Refills: 3 | Status: SHIPPED | OUTPATIENT
Start: 2023-01-01 | End: 2023-01-01 | Stop reason: ALTCHOICE

## 2023-01-01 RX ORDER — NYSTATIN 100000 [USP'U]/G
POWDER TOPICAL 2 TIMES DAILY
Qty: 300 G | Refills: 3 | Status: SHIPPED | OUTPATIENT
Start: 2023-01-01 | End: 2023-11-20

## 2023-01-01 RX ORDER — BUSPIRONE HYDROCHLORIDE 10 MG/1
10 TABLET ORAL 2 TIMES DAILY
Qty: 180 TABLET | Refills: 3 | Status: SHIPPED | OUTPATIENT
Start: 2023-01-01 | End: 2023-11-20

## 2023-01-01 RX ORDER — GABAPENTIN 100 MG/1
1 CAPSULE ORAL NIGHTLY
COMMUNITY
Start: 2023-01-01 | End: 2023-01-01 | Stop reason: SDUPTHER

## 2023-01-01 RX ORDER — BUSPIRONE HYDROCHLORIDE 10 MG/1
1 TABLET ORAL 2 TIMES DAILY
COMMUNITY
Start: 2022-01-01 | End: 2023-01-01 | Stop reason: SDUPTHER

## 2023-01-01 RX ORDER — CEPHALEXIN 500 MG/1
CAPSULE ORAL
Qty: 30 CAPSULE | Refills: 35 | Status: SHIPPED | OUTPATIENT
Start: 2023-01-01 | End: 2023-11-20

## 2023-01-01 RX ORDER — PANTOPRAZOLE SODIUM 40 MG/1
40 TABLET, DELAYED RELEASE ORAL
COMMUNITY
Start: 2022-01-10 | End: 2023-01-01 | Stop reason: SDUPTHER

## 2023-01-01 RX ORDER — INSULIN LISPRO 100 [IU]/ML
INJECTION, SOLUTION INTRAVENOUS; SUBCUTANEOUS
Qty: 15 ML | Refills: 2 | Status: SHIPPED | OUTPATIENT
Start: 2023-01-01 | End: 2023-01-01 | Stop reason: WASHOUT

## 2023-01-01 RX ORDER — ESCITALOPRAM OXALATE 10 MG/1
10 TABLET ORAL DAILY
Qty: 90 TABLET | Refills: 3 | Status: SHIPPED | OUTPATIENT
Start: 2023-01-01 | End: 2023-11-20

## 2023-01-01 RX ORDER — FUROSEMIDE 20 MG/1
TABLET ORAL
Qty: 15 TABLET | Refills: 0 | Status: SHIPPED | OUTPATIENT
Start: 2023-01-01 | End: 2023-01-01

## 2023-01-01 RX ORDER — BUMETANIDE 1 MG/1
TABLET ORAL
COMMUNITY
Start: 2023-01-01 | End: 2023-01-01 | Stop reason: SDUPTHER

## 2023-01-01 RX ORDER — LATANOPROST 50 UG/ML
1 SOLUTION/ DROPS OPHTHALMIC DAILY
COMMUNITY

## 2023-01-01 RX ORDER — ZOLPIDEM TARTRATE 5 MG/1
5 TABLET ORAL NIGHTLY PRN
Qty: 14 TABLET | Refills: 0 | Status: SHIPPED | OUTPATIENT
Start: 2023-01-01 | End: 2023-01-01

## 2023-01-01 RX ORDER — POTASSIUM CHLORIDE 750 MG/1
10 TABLET, FILM COATED, EXTENDED RELEASE ORAL DAILY
Qty: 90 TABLET | Refills: 3 | Status: SHIPPED | OUTPATIENT
Start: 2023-01-01 | End: 2023-11-20

## 2023-01-01 RX ORDER — FUROSEMIDE 20 MG/1
TABLET ORAL
Qty: 15 TABLET | Refills: 0 | Status: SHIPPED | OUTPATIENT
Start: 2023-01-01 | End: 2023-01-01 | Stop reason: ALTCHOICE

## 2023-01-01 RX ORDER — FLASH GLUCOSE SENSOR
KIT MISCELLANEOUS
Qty: 2 EACH | Refills: 5 | Status: SHIPPED
Start: 2023-01-01 | End: 2023-11-20

## 2023-01-01 RX ORDER — FLASH GLUCOSE SCANNING READER
EACH MISCELLANEOUS
Qty: 1 EACH | Refills: 0 | Status: SHIPPED
Start: 2023-01-01 | End: 2023-11-20

## 2023-01-01 RX ORDER — CEPHALEXIN 500 MG/1
500 CAPSULE ORAL DAILY
Qty: 30 CAPSULE | Refills: 1 | Status: SHIPPED | OUTPATIENT
Start: 2023-01-01 | End: 2023-01-01 | Stop reason: ALTCHOICE

## 2023-01-01 RX ORDER — HYDROCHLOROTHIAZIDE 25 MG/1
25 TABLET ORAL DAILY
Qty: 90 TABLET | Refills: 3 | Status: SHIPPED | OUTPATIENT
Start: 2023-01-01 | End: 2023-11-20

## 2023-01-01 RX ORDER — OSTOMY SUPPLY
1 PASTE (GRAM) MISCELLANEOUS 2 TIMES DAILY PRN
Qty: 71 G | Refills: 2 | Status: SHIPPED | OUTPATIENT
Start: 2023-01-01 | End: 2023-11-20

## 2023-01-01 RX ORDER — ZOLPIDEM TARTRATE 10 MG/1
10 TABLET ORAL NIGHTLY PRN
Qty: 30 TABLET | Refills: 0 | Status: SHIPPED | OUTPATIENT
Start: 2023-01-01 | End: 2023-01-01 | Stop reason: DRUGHIGH

## 2023-01-01 RX ORDER — DOXYCYCLINE 100 MG/1
100 CAPSULE ORAL 2 TIMES DAILY
COMMUNITY

## 2023-01-01 RX ORDER — APIXABAN 5 MG/1
5 TABLET, FILM COATED ORAL 2 TIMES DAILY
Qty: 180 TABLET | Refills: 0 | Status: SHIPPED | OUTPATIENT
Start: 2023-01-01 | End: 2023-01-01 | Stop reason: DRUGHIGH

## 2023-01-01 RX ORDER — ZOLPIDEM TARTRATE 10 MG/1
5 TABLET ORAL NIGHTLY PRN
Qty: 90 TABLET | Refills: 0 | Status: SHIPPED | OUTPATIENT
Start: 2023-01-01 | End: 2023-01-01 | Stop reason: SDUPTHER

## 2023-01-01 RX ORDER — TORSEMIDE 100 MG/1
100 TABLET ORAL DAILY
COMMUNITY
Start: 2022-01-11 | End: 2023-01-01 | Stop reason: SDUPTHER

## 2023-01-01 RX ORDER — ZOLPIDEM TARTRATE 10 MG/1
5 TABLET ORAL NIGHTLY PRN
Qty: 7 TABLET | Refills: 0 | Status: SHIPPED | OUTPATIENT
Start: 2023-01-01 | End: 2023-01-01 | Stop reason: SDUPTHER

## 2023-01-01 RX ORDER — BUMETANIDE 2 MG/1
2 TABLET ORAL DAILY
Qty: 90 TABLET | Refills: 1 | Status: SHIPPED | OUTPATIENT
Start: 2023-01-01 | End: 2023-01-01

## 2023-01-01 RX ORDER — SPIRONOLACTONE 50 MG/1
50 TABLET, FILM COATED ORAL DAILY
COMMUNITY
Start: 2021-01-27 | End: 2023-01-01 | Stop reason: SDUPTHER

## 2023-01-01 RX ORDER — ZOLPIDEM TARTRATE 10 MG/1
5 TABLET ORAL NIGHTLY PRN
COMMUNITY
Start: 2019-05-24 | End: 2023-01-01 | Stop reason: SDUPTHER

## 2023-01-01 RX ORDER — MULTIVIT WITH MINERALS/HERBS
1 TABLET ORAL DAILY
COMMUNITY

## 2023-01-01 RX ORDER — BUMETANIDE 2 MG/1
2 TABLET ORAL DAILY
COMMUNITY
End: 2023-01-01 | Stop reason: SDUPTHER

## 2023-01-01 RX ORDER — ALLOPURINOL 300 MG/1
300 TABLET ORAL DAILY
Qty: 90 TABLET | Refills: 3 | Status: SHIPPED | OUTPATIENT
Start: 2023-01-01 | End: 2023-01-01 | Stop reason: SDUPTHER

## 2023-01-01 RX ORDER — POTASSIUM CHLORIDE 750 MG/1
10 TABLET, FILM COATED, EXTENDED RELEASE ORAL DAILY
COMMUNITY
End: 2023-01-01 | Stop reason: SDUPTHER

## 2023-01-01 RX ORDER — IPRATROPIUM BROMIDE AND ALBUTEROL SULFATE 2.5; .5 MG/3ML; MG/3ML
SOLUTION RESPIRATORY (INHALATION)
Qty: 180 ML | Refills: 3 | Status: SHIPPED | OUTPATIENT
Start: 2023-01-01 | End: 2023-11-20

## 2023-01-01 RX ORDER — UMECLIDINIUM BROMIDE AND VILANTEROL TRIFENATATE 62.5; 25 UG/1; UG/1
1 POWDER RESPIRATORY (INHALATION) DAILY
COMMUNITY
End: 2023-01-01 | Stop reason: SDUPTHER

## 2023-01-01 RX ORDER — PRAVASTATIN SODIUM 40 MG/1
40 TABLET ORAL DAILY
Qty: 90 TABLET | Refills: 3 | Status: SHIPPED | OUTPATIENT
Start: 2023-01-01 | End: 2023-11-20

## 2023-01-01 RX ORDER — TERAZOSIN 10 MG/1
10 CAPSULE ORAL NIGHTLY
Qty: 90 CAPSULE | Refills: 3 | Status: SHIPPED | OUTPATIENT
Start: 2023-01-01 | End: 2023-11-20

## 2023-01-01 RX ORDER — APIXABAN 5 MG/1
1 TABLET, FILM COATED ORAL 2 TIMES DAILY
COMMUNITY
Start: 2022-01-01 | End: 2023-01-01 | Stop reason: SDUPTHER

## 2023-01-01 ASSESSMENT — ENCOUNTER SYMPTOMS
SHORTNESS OF BREATH: 0
WHEEZING: 0
DEPRESSION: 0
OCCASIONAL FEELINGS OF UNSTEADINESS: 0
TREMORS: 0
FEVER: 0
PALPITATIONS: 0
LOSS OF SENSATION IN FEET: 0
PALPITATIONS: 0
SHORTNESS OF BREATH: 1
WHEEZING: 0
OCCASIONAL FEELINGS OF UNSTEADINESS: 1
COUGH: 0
FATIGUE: 0
FREQUENCY: 0
DYSURIA: 0
SHORTNESS OF BREATH: 0
PALPITATIONS: 0
DEPRESSION: 0
DIABETIC ASSOCIATED SYMPTOMS: 0
COUGH: 0
COUGH: 0
LOSS OF SENSATION IN FEET: 0
CHEST TIGHTNESS: 0
CHILLS: 0
CHEST TIGHTNESS: 0
FEVER: 0
OCCASIONAL FEELINGS OF UNSTEADINESS: 1

## 2023-01-01 ASSESSMENT — ACTIVITIES OF DAILY LIVING (ADL)
TAKING_MEDICATION: INDEPENDENT
GROCERY_SHOPPING: INDEPENDENT
BATHING: INDEPENDENT
BATHING: INDEPENDENT
TAKING_MEDICATION: INDEPENDENT
DOING_HOUSEWORK: INDEPENDENT
DRESSING: INDEPENDENT
GROCERY_SHOPPING: INDEPENDENT
DOING_HOUSEWORK: INDEPENDENT
MANAGING_FINANCES: INDEPENDENT
DRESSING: INDEPENDENT
MANAGING_FINANCES: INDEPENDENT

## 2023-01-01 ASSESSMENT — PATIENT HEALTH QUESTIONNAIRE - PHQ9
1. LITTLE INTEREST OR PLEASURE IN DOING THINGS: NOT AT ALL
SUM OF ALL RESPONSES TO PHQ9 QUESTIONS 1 AND 2: 0
SUM OF ALL RESPONSES TO PHQ9 QUESTIONS 1 AND 2: 0
2. FEELING DOWN, DEPRESSED OR HOPELESS: NOT AT ALL
1. LITTLE INTEREST OR PLEASURE IN DOING THINGS: NOT AT ALL
2. FEELING DOWN, DEPRESSED OR HOPELESS: NOT AT ALL

## 2023-01-20 NOTE — CARE COORDINATION
Called pt and left detailed voicemail on new date and time of appt and to please call Arabella Carpio office back to let us know if he can accept or not.   PATIENT WAS SEEN IN OFFICE PRIOR TO [facility] ED FOLLOW UP FROM [facility]  FOR GOUT. ACC REVIEWED ACC PROGRAM AND HE AND HIS WIFE AGREED  TO FURTHER CONVERSATIONS WITH [facility]. PATIENT WAS SUPPLIED INTRO LETTER AND CONTACT INFORMATION. FOLLOW UP FOR ASSESSMENT TO FOLLOW IN WEEK.

## 2023-02-12 PROBLEM — D75.829 HIT (HEPARIN-INDUCED THROMBOCYTOPENIA) (MULTI): Status: ACTIVE | Noted: 2023-01-01

## 2023-02-12 PROBLEM — R60.9 PERIPHERAL EDEMA: Status: ACTIVE | Noted: 2023-01-01

## 2023-02-12 PROBLEM — L03.116 CELLULITIS OF LEFT LOWER LIMB: Status: RESOLVED | Noted: 2023-01-01 | Resolved: 2023-01-01

## 2023-02-12 PROBLEM — I48.20 CHRONIC ATRIAL FIBRILLATION (MULTI): Status: ACTIVE | Noted: 2023-01-01

## 2023-02-12 PROBLEM — J69.0 ASPIRATION PNEUMONIA (MULTI): Status: ACTIVE | Noted: 2023-01-01

## 2023-02-12 PROBLEM — R29.898 LEFT HAND WEAKNESS: Status: ACTIVE | Noted: 2023-01-01

## 2023-02-12 PROBLEM — K25.0 ACUTE GASTRIC ULCER WITH HEMORRHAGE: Status: RESOLVED | Noted: 2023-01-01 | Resolved: 2023-01-01

## 2023-02-12 PROBLEM — F51.01 PRIMARY INSOMNIA: Status: ACTIVE | Noted: 2023-01-01

## 2023-02-12 PROBLEM — N17.9 ACUTE KIDNEY INJURY (CMS-HCC): Status: ACTIVE | Noted: 2023-01-01

## 2023-02-12 PROBLEM — K25.0 ACUTE GASTRIC ULCER WITH HEMORRHAGE: Status: ACTIVE | Noted: 2023-01-01

## 2023-02-12 PROBLEM — G93.49 UREMIC ENCEPHALOPATHY: Status: ACTIVE | Noted: 2023-01-01

## 2023-02-12 PROBLEM — L03.116 CELLULITIS OF LEFT LOWER LIMB: Status: ACTIVE | Noted: 2023-01-01

## 2023-02-12 PROBLEM — J44.9 COPD (CHRONIC OBSTRUCTIVE PULMONARY DISEASE) (MULTI): Status: ACTIVE | Noted: 2023-01-01

## 2023-02-12 PROBLEM — I50.9 CHF EXACERBATION (MULTI): Status: ACTIVE | Noted: 2023-01-01

## 2023-02-12 PROBLEM — Z86.39 PERSONAL HISTORY OF OTHER ENDOCRINE, NUTRITIONAL AND METABOLIC DISEASE: Status: RESOLVED | Noted: 2023-01-01 | Resolved: 2023-01-01

## 2023-02-12 PROBLEM — G56.22 ULNAR NEUROPATHY OF LEFT UPPER EXTREMITY: Status: ACTIVE | Noted: 2023-01-01

## 2023-02-12 PROBLEM — N19 UREMIC ENCEPHALOPATHY: Status: ACTIVE | Noted: 2023-01-01

## 2023-02-12 PROBLEM — R53.81 PHYSICAL DECONDITIONING: Status: ACTIVE | Noted: 2023-01-01

## 2023-02-12 PROBLEM — K92.2 GASTROINTESTINAL HEMORRHAGE, UNSPECIFIED: Status: ACTIVE | Noted: 2023-01-01

## 2023-02-12 PROBLEM — N18.31 TYPE 2 DIABETES MELLITUS WITH STAGE 3A CHRONIC KIDNEY DISEASE, WITHOUT LONG-TERM CURRENT USE OF INSULIN (MULTI): Status: ACTIVE | Noted: 2023-01-01

## 2023-02-12 PROBLEM — K21.9 GERD (GASTROESOPHAGEAL REFLUX DISEASE): Status: ACTIVE | Noted: 2023-01-01

## 2023-02-12 PROBLEM — K92.2 GASTROINTESTINAL HEMORRHAGE, UNSPECIFIED: Status: RESOLVED | Noted: 2023-01-01 | Resolved: 2023-01-01

## 2023-02-12 PROBLEM — M1A.0710 IDIOPATHIC CHRONIC GOUT OF RIGHT FOOT WITHOUT TOPHUS: Status: ACTIVE | Noted: 2023-01-01

## 2023-02-12 PROBLEM — S81.802S LEG WOUND, LEFT, SEQUELA: Status: ACTIVE | Noted: 2023-01-01

## 2023-02-12 PROBLEM — K63.1 SMALL BOWEL PERFORATION (MULTI): Status: ACTIVE | Noted: 2023-01-01

## 2023-02-12 PROBLEM — Z86.39 PERSONAL HISTORY OF OTHER ENDOCRINE, NUTRITIONAL AND METABOLIC DISEASE: Status: ACTIVE | Noted: 2023-01-01

## 2023-02-12 PROBLEM — E78.2 HYPERLIPIDEMIA, MIXED: Status: ACTIVE | Noted: 2023-01-01

## 2023-02-12 PROBLEM — E11.22 TYPE 2 DIABETES MELLITUS WITH STAGE 3A CHRONIC KIDNEY DISEASE, WITHOUT LONG-TERM CURRENT USE OF INSULIN (MULTI): Status: ACTIVE | Noted: 2023-01-01

## 2023-02-12 PROBLEM — I10 HTN (HYPERTENSION), BENIGN: Status: ACTIVE | Noted: 2023-01-01

## 2023-03-17 PROBLEM — K57.10 DUODENAL DIVERTICULUM: Status: ACTIVE | Noted: 2023-01-01

## 2023-03-17 PROBLEM — K59.00 CONSTIPATION IN MALE: Status: ACTIVE | Noted: 2023-01-01

## 2023-03-17 PROBLEM — M79.2 NEUROPATHIC PAIN, LEG: Status: ACTIVE | Noted: 2023-01-01

## 2023-03-17 PROBLEM — K56.609 SMALL BOWEL OBSTRUCTION (MULTI): Status: ACTIVE | Noted: 2023-01-01

## 2023-03-17 NOTE — TELEPHONE ENCOUNTER
Medication has been pended to provider for approval.    
Patient's daughter called in stating that the patient is needing a refill on all medications sent to [facility] :  -Triphrocaps 1 mg  -Montelukast Sodium 10 mg  -Buspirone HCI 10 mg  Amlodipine Besylate 5 mg  -HumaLog Kwikpen 100 unit/ml  -Zolpidem Tartrate 5 mg  -Torsemide 100mg  -Spironolactone 50 mg  -Potassium Chloride 10 MEQ  -Finasteride 5 mg  -Gabapentin 100 mg  -Allopurinol 300 mg  -Pravastatin Sodium 40 mg  -Terazosin HCI 10 mg  -Metoprolol Tartrate 25 mg   -Hydrochlorothiazide 25 mg  -Pantoprazole 40 mg  -Eliquis 5 mg  -Anoro Ellipta 62.5-25 mcg/act    
NATIVIDAD/Timo

## 2023-03-28 NOTE — TELEPHONE ENCOUNTER
Rx Refill Request Telephone Encounter    Name:  Magno Zimmer  :  [id number]  Medication Name:  IPRATROPIUM - ALBUTEROL 0.5-2.5/3ML  Specific Pharmacy location:  [facility] [address] [facility]   Date of last appointment:  23  Date of next appointment:  23  Best number to reach patient:  [phone]

## 2023-04-03 NOTE — PROGRESS NOTES
"Subjective   Reason for Visit: Magno Zimmer is an 83 y.o. male here for a Medicare Wellness visit.     Past Medical, Surgical, and Family History reviewed and updated in chart.    Reviewed all medications by prescribing practitioner or clinical pharmacist (such as prescriptions, OTCs, herbal therapies and supplements) and documented in the medical record.    HPI    Diabetes Mellitus:  Reports taking medication as advised and denies side effects. The patient's is checking sugars and reports values to be in expected ranges. Checking blood sugar routinely and denies any hypoglycemic events since last visit.   last eye exam: 2/2023  last foot exam: Pt does not recall  Hypertension: reports no side effects to prescribed medication. The patient reports good compliance with the medication. The patient is trying to follow a low-salt diet.  Hyperlipidemia: Reports taking medication as advised and without side effects.  The patient is reporting no leg cramping in particular. The patient is trying to follow a low-fat diet.  COPD: Patient is taking medication as prescribed. He is not reporting any current exacerbation in the condition.   Atrial Fibrillation: there have been no palpitations, no shortness of breath, and no activity induced chest pain. There is no excessive bruising or bleeding with anticoagulation.   GERD: The patient is not reporting any significant heartburn or indigestion. The patient is not getting any symptoms at night. Symptoms are stable so long as medications are taken.  Edema: Patient is taking his medication as prescribed and without adverse reactions    Patient Care Team:  Jimi Orellana MD as PCP - General  Jimi Orellana MD as PCP - [facility] Provider     Review of Systems    Objective   Vitals:  /64   Pulse 72   Temp 36.1 °C (97 °F)   Resp 16   Ht 1.753 m (5' 9\")   Wt 129 kg (285 lb 6.4 oz)   SpO2 95%   BMI 42.15 kg/m²       Physical Exam    Assessment/Plan   Problem " List Items Addressed This Visit          Respiratory    COPD (chronic obstructive pulmonary disease) (CMS/[facility])       Musculoskeletal    Idiopathic chronic gout of right foot without tophus     Other Visit Diagnoses       Healthcare maintenance

## 2023-04-04 PROBLEM — Z00.00 ROUTINE GENERAL MEDICAL EXAMINATION AT HEALTH CARE FACILITY: Status: ACTIVE | Noted: 2023-01-01

## 2023-04-04 NOTE — PROGRESS NOTES
"Subjective   Reason for Visit: Magno Zimmer is an 83 y.o. male here for a Medicare Wellness visit.     Past Medical, Surgical, and Family History reviewed and updated in chart.          Diabetes Mellitus: Reports taking medication as advised and denies side effects. The patient's is checking sugars and reports values to be in expected ranges. Checking blood sugar routinely and denies any hypoglycemic events since last visit.   Hypertension, reports no side effects to prescribed medication. The patient reports good compliance with the medication. The patient is trying to follow a low-salt diet.  Hyperlipidemia: Reports taking medication as advised and without side effects.  The patient is reporting no leg cramping in particular. The patient is trying to follow a low-fat diet.  COPD: This patient does have some difficulties with breathing although fairly well-controlled with inhaler use.  This patient has history of gout but has not had recent flareups. Patient rarely needs to use the acute medicines and is taking preventative medicine regularly.    Patient Care Team:  Jimi Orellana MD as PCP - General  Jimi Orellana MD as PCP [facility] Attributed Provider         Objective   Vitals:  /64   Pulse 72   Temp 36.1 °C (97 °F)   Resp 16   Ht 1.753 m (5' 9\")   Wt 129 kg (285 lb 6.4 oz)   SpO2 95%   BMI 42.15 kg/m²       Physical Exam  Vitals reviewed.   Constitutional:       Appearance: Normal appearance.   Neck:      Vascular: No carotid bruit.   Cardiovascular:      Rate and Rhythm: Normal rate and regular rhythm.      Pulses: Normal pulses.      Heart sounds: Normal heart sounds.   Pulmonary:      Effort: Pulmonary effort is normal. No respiratory distress.      Breath sounds: Normal breath sounds. No wheezing.   Abdominal:      General: There is no distension.      Palpations: Abdomen is soft. There is no mass.      Tenderness: There is no abdominal tenderness. There is no right CVA tenderness, " left CVA tenderness, guarding or rebound.   Musculoskeletal:      Cervical back: Normal range of motion and neck supple. No rigidity.      Right lower leg: No edema.      Left lower leg: No edema.   Lymphadenopathy:      Cervical: No cervical adenopathy.   Skin:     Comments: The skin of the left lower leg is erythematous with very small blisters formed.  No open areas of the skin.  The skin of the leg is warm to touch.   Neurological:      Mental Status: He is alert.         Assessment/Plan   Problem List Items Addressed This Visit          Nervous    Primary insomnia    Relevant Orders    Opiate/Opioid/Benzo Extended Prescription Compliance       Respiratory    COPD (chronic obstructive pulmonary disease) (CMS/[facility])    Current Assessment & Plan      Is stable, continue with current treatment.         Relevant Medications    ipratropium-albuteroL (Duo-Neb) 0.5-2.5 mg/3 mL nebulizer solution       Circulatory    HTN (hypertension), benign    Current Assessment & Plan      Is well controlled, continue with current medications.            Musculoskeletal    Cellulitis of left leg    Overview     Cellulitis of left lower extremity         Current Assessment & Plan      Is present and symptomatic, will need treatment.         Relevant Medications    cephalexin (Keflex) 500 mg capsule    Idiopathic chronic gout of right foot without tophus    Current Assessment & Plan      Is stable, continue with current treatment.         Relevant Medications    allopurinol (Zyloprim) 300 mg tablet       Endocrine/Metabolic    Type 2 diabetes mellitus with stage 3a chronic kidney disease, without long-term current use of insulin (CMS/[facility])    Current Assessment & Plan      Is stable, continue with current treatment.  Kidney function is stable and no open wounds on the leg at the present time.         Relevant Medications    B complex-vitamin C-folic acid (Nephrocaps) 1 mg capsule    Other Relevant Orders    Follow Up In [facility] - PCP       Other    Routine general medical examination at health care facility - Primary    Current Assessment & Plan     Medicare wellness visit done, patient is in satisfactory living circumstances where the patient's needs are met.  This patient is advised to develop or update their living will and provide us a copy for the chart.         Rash    Current Assessment & Plan      Is present and symptomatic, will need treatment.  Nystatin powder prescribed.         Relevant Medications    nystatin (Mycostatin) 100,000 unit/gram powder     Other Visit Diagnoses       Type 2 diabetes mellitus with other skin ulcer (CODE) (CMS/[facility])

## 2023-04-05 PROBLEM — R21 RASH: Status: ACTIVE | Noted: 2023-01-01

## 2023-04-05 PROBLEM — D75.829 HIT (HEPARIN-INDUCED THROMBOCYTOPENIA) (MULTI): Status: RESOLVED | Noted: 2023-01-01 | Resolved: 2023-01-01

## 2023-04-06 NOTE — ASSESSMENT & PLAN NOTE
Is present and symptomatic, will need treatment.   "CHIEF COMPLAINT ON ADMISSION  Chief Complaint   Patient presents with   • Nausea/Vomiting/Diarrhea     \"I woke up Wednesday morning with diarrhea and abd pain. I haven't been eating much. Then I started vomiting today.\"   • Abdominal Pain     generalized       CODE STATUS  Full Code    HPI & HOSPITAL COURSE  This is a 55 y.o. male here with Nausea/Vomiting/Diarrhea (\"I woke up Wednesday morning with diarrhea and abd pain. I haven't been eating much. Then I started vomiting today.\") and Abdominal Pain (generalized)  Please review Dr. Lexie Cantu M.D. notes for further details of history of present illness, past medical/social/family histories, allergies and medications.  He has a history of diabetes and diabetic gastroparesis. He is followed in GI clinic  He was put on empiric antibiotics targeting intraabdominal infection. His CT scan however was negative. His C. Diff study came back negative. His diarrhea has improved so much so that stool studies were not collected. His leukocytosis resolving, trending down. His diet was advanced and he kept his food down. He wanted to go home. He will therefore be discharged home. We will send him home on ceftin and flagyl with probiotics for a total antibiotic course of 10 days for community acquired diarrhea. I suspect that he has a mild gastroenteritis along with that,    His blood sugars are stable. Continue his home medications. Advise Dakota Cisneros to check blood glucoses at home and have a log for primary provider to review. His blood pressures are stable. COntinue his Prinvil. Advise Dakota Cisneros to check blood pressure at home at least twice a day and have a log for primary provider to review. His blood cultures are still pending and this can be followed up by his primary provider.At discharge date, Dakota KATHIE Blayne afebrile and hemodynamically stable.  Dakota Cisneros wanted to be discharged today.    Discharge Physical Exam  General/Constitutional: No acute " distress.   Head: Normocephalic, atraumatic  ENT: Oral mucosa is moist. No obvious pharyngeal exudates  Eyes: Pink conjunctiva, no scleral icterus  Neck: Supple, no lymphadenopathy  Cardiovascular: Normal rate and regular rhythm. S1,2 noted. No murmurs, gallops or rubs.  Pulmonary: Clear to auscultation bilaterally. No wheezes, rales or rhonchi.  Abdominal: Soft, nontender, not distended, bowel sounds normoactive. No guarding or peritoneal signs.  Musculoskeletal: No tenderness to palpation of chest wall.  Neurologic: Alert and oriented. Grossly nonfocal, moving all extremities.  Genitourinary: No gross hematuria  Skin: No obvious rash.  Psychiatric: Pleasant, cooperative.  Vitals Reviewed  Labs Reviewed  Imaging reviewed  Nursing notes reviewed      The patient recovered much more quickly than anticipated on admission.    Therefore, he is discharged in good and stable condition with close outpatient follow-up.    SPECIFIC OUTPATIENT FOLLOW-UP  Follow up with Shawn Fischer M.D. In 1-2 weeks  Follow up with page gastroenterologist in 1-2 weeks    DISCHARGE PROBLEM LIST  Principal Problem:    Gastroenteritis POA: Unknown  Active Problems:    Leukocytosis POA: Yes    Essential hypertension POA: Yes    T2DM (type 2 diabetes mellitus) (HCC) POA: Unknown    Diabetic gastroparesis (HCC) POA: Unknown        FOLLOW UP  Future Appointments  Date Time Provider Department Center   5/18/2018 10:20 AM Norberto Porras M.D. [address]     No follow-up provider specified.    MEDICATIONS ON DISCHARGE   Dakota Cisneros   Home Medication Instructions KAMALA:[id number]    Printed on:04/28/18 4148   Medication Information                      cefUROXime (CEFTIN) 500 MG Tab  Take 1 Tab by mouth 2 times a day for 8 days.             Cholecalciferol 5000 units Tab  Take 5,000 Units by mouth every evening.             esomeprazole (NEXIUM) 40 MG delayed-release capsule  Take 40 mg by mouth every morning before breakfast.              HYDROcodone-acetaminophen (NORCO) 5-325 MG Tab per tablet  Take 1-2 Tabs by mouth every four hours as needed.             Insulin Glargine (BASAGLAR KWIKPEN) 100 UNIT/ML Solution Pen-injector  Inject 60 Units as instructed every evening.             insulin regular human (HUMULIN/NOVOLIN R)  Inject 20 Units as instructed 3 times a day.             liraglutide (VICTOZA) 18 MG/3ML Solution Pen-injector injection  Inject 1.2 mg as instructed every day.             lisinopril (PRINIVIL) 10 MG Tab  Take 10 mg by mouth every evening.             metoclopramide (REGLAN) 10 MG Tab  Take 10 mg by mouth 2 Times a Day. Sometimes adds a 3rd dose if needed             metroNIDAZOLE (FLAGYL) 500 MG Tab  Take 1 Tab by mouth every 8 hours for 8 days.             oxyCODONE CR (OXYCONTIN) 40 MG Tablet Extended Release 12 hour Abuse-Deterrent tablet  Take 40 mg by mouth every 8 hours.             Saccharomyces boulardii (PROBIOTIC) 250 MG Cap  Take 1 Cap by mouth 2 times a day, with meals for 8 days.                 DIET  Orders Placed This Encounter   Procedures   • DIET ORDER     Standing Status:   Standing     Number of Occurrences:   1     Order Specific Question:   Diet:     Answer:   Diabetic [3]       ACTIVITY  As tolerated      CONSULTATIONS      PROCEDURES  Ct-abdomen-pelvis With    Result Date: 4/27/2018 4/27/2018 5:05 AM HISTORY/REASON FOR EXAM:  Diarrhea and abdominal pain. TECHNIQUE/EXAM DESCRIPTION:   CT scan of the abdomen and pelvis with contrast. Contrast-enhanced helical scanning was obtained from the diaphragmatic domes through the pubic symphysis following the bolus administration of nonionic contrast without complication. 100 mL of Omnipaque 350 nonionic contrast was administered without complication. Low dose optimization technique was utilized for this CT exam including automated exposure control and adjustment of the mA and/or kV according to patient size. COMPARISON: No prior studies available. FINDINGS: CT  Abdomen: The liver is unremarkable. The spleen is unremarkable. Adrenal glands are unremarkable. Small cortical cyst lateral aspect LEFT kidney. RIGHT kidney is unremarkable. The pancreas is unremarkable. Gallbladder is absent. CT Pelvis: Bladder is unremarkable. Appendix is not visualized.  No pericecal inflammatory changes. No peritoneal fluid or pneumoperitoneum. Mild atherosclerotic change abdominal aorta. No major bony abnormality is seen.     1.  No secondary signs of acute appendicitis, however the appendix is not visualized. 2.  No focal mesenteric inflammatory process. 3.  No bowel obstruction or pneumoperitoneum.      LABORATORY  Lab Results   Component Value Date/Time    SODIUM 143 04/28/2018 04:38 AM    POTASSIUM 3.2 (L) 04/28/2018 04:38 AM    CHLORIDE 110 04/28/2018 04:38 AM    CO2 22 04/28/2018 04:38 AM    GLUCOSE 117 (H) 04/28/2018 04:38 AM    BUN 11 04/28/2018 04:38 AM    CREATININE 0.81 04/28/2018 04:38 AM        Lab Results   Component Value Date/Time    WBC 14.4 (H) 04/28/2018 04:38 AM    HEMOGLOBIN 13.5 (L) 04/28/2018 04:38 AM    HEMATOCRIT 41.2 (L) 04/28/2018 04:38 AM    PLATELETCT 303 04/28/2018 04:38 AM        Total time of the discharge process exceeds 38 minutes

## 2023-04-06 NOTE — ASSESSMENT & PLAN NOTE
Is stable, continue with current treatment.  Kidney function is stable and no open wounds on the leg at the present time.

## 2023-04-06 NOTE — ASSESSMENT & PLAN NOTE
Medicare wellness visit done, patient is in satisfactory living circumstances where the patient's needs are met.  This patient is advised to develop or update their living will and provide us a copy for the chart.

## 2023-04-14 NOTE — TELEPHONE ENCOUNTER
Patients wife called stating patient has a really bad sore throat and cold symptoms. She is wanting to know what he can take since he is diabetic.   Please advise

## 2023-04-20 NOTE — PROGRESS NOTES
"Pharmacy Post-Discharge Visit  Magno Zimmer is a 83 y.o. male was referred to Clinical Pharmacy Team to complete a post-discharge medication optimization and monitoring visit.  The patient was referred for their diabetes, COPD, and heart failure.    Referring Provider: Jimi Orellana MD    HISTORY OF PRESENT ILLNESS  Spoke with patient and patient's daughter, Kacie, today regarding diabetes, COPD and heart failure management. At this time, patient states he is doing \"very well.\" Patient denies a shortness of breath or increased cough. Patient continues to test blood sugar daily and as needed. Blood sugar readings are elevated, however patient states he is interested in CGM for better glucose control. Patient also interested in  PAP for [facility] financial assistance. Patient does report increased insomnia at night, but takes naps throughout the day. Currently, patient consistently takes Ambien 10 mg nightly, however is still experiencing insomnia.    Subjective   No Known Allergies    [facility] - [place], [place] - [address] [place], [place] [address] [facility] [address]  Phone: [phone] Fax: [phone]    [id number] - [place], [place] - [address] [facility] [address]  [address] [facility] [address]  Phone: [phone] Fax: [phone]    Social History     Social History Narrative    Not on file      Diet: Did not discuss    Exercise: Patient is able to to walk ~1000 steps and has to take 1-2 breaks. This is typical for him, and he remains compliant to this regimen.    Objective     There were no vitals taken for this visit.     LAB  Lab Results   Component Value Date    BILITOT 0.4 03/16/2023    CALCIUM 9.5 03/16/2023    CO2 27 03/16/2023    CL 98 03/16/2023    CREATININE 3.03 (H) 03/16/2023    GLUCOSE 154 (H) 03/16/2023    ALKPHOS 76 03/16/2023    K 4.9 03/16/2023    PROT 6.9 03/16/2023     03/16/2023    AST 15 03/16/2023    ALT 12 03/16/2023    BUN 68 " (H) 2023    ANIONGAP 16 2023    MG 2.20 2023    PHOS 3.3 2022    ALBUMIN 3.7 2023    LIPASE 41 2023     Lab Results   Component Value Date    TRIG 128 2023    CHOL 118 2023    HDL 29.5 (A) 2023     Lab Results   Component Value Date    HGBA1C 7.3 (A) 2023     Current Outpatient Medications on File Prior to Visit   Medication Sig Dispense Refill    albuterol 90 mcg/actuation inhaler Inhale 2 puffs every 6 hours if needed for wheezing.      allopurinol (Zyloprim) 300 mg tablet Take 1 tablet (300 mg) by mouth once daily. 90 tablet 3    amLODIPine (Norvasc) 5 mg tablet TAKE 1 TABLET BY MOUTH EVERY DAY UPON ARISING 90 tablet 3    B complex-vitamin C-folic acid (Nephrocaps) 1 mg capsule Take 1 capsule by mouth once daily. 90 capsule 3    brimonidine (AlphaGAN P) 0.15 % ophthalmic solution Administer 1 drop into affected eye(s) in the morning and 1 drop in the evening and 1 drop before bedtime.      busPIRone (Buspar) 10 mg tablet Take 1 tablet (10 mg) by mouth in the morning and 1 tablet (10 mg) before bedtime. 180 tablet 3    [] cephalexin (Keflex) 500 mg capsule Take 1 capsule (500 mg) by mouth in the morning and 1 capsule (500 mg) in the evening and 1 capsule (500 mg) before bedtime. Do all this for 10 days. 30 capsule 0    cyanocobalamin (Vitamin B-12) 1,000 mcg tablet Take 1 tablet (1,000 mcg) by mouth once daily.      Eliquis 5 mg tablet Take 1 tablet (5 mg) by mouth in the morning and 1 tablet (5 mg) before bedtime. 180 tablet 0    finasteride (Proscar) 5 mg tablet Take 1 tablet (5 mg) by mouth once daily. 90 tablet 3    gabapentin (Neurontin) 100 mg capsule Take 1 capsule (100 mg) by mouth once daily at bedtime. 90 capsule 3    HumaLOG KwikPen Insulin 100 unit/mL injection INJECT 5 UNIT 3 times daily with meals 300 mL 3    hydroCHLOROthiazide (HYDRODiuril) 25 mg tablet Take 1 tablet (25 mg) by mouth once daily. 90 tablet 3    ipratropium-albuteroL  (Duo-Neb) 0.5-2.5 mg/3 mL nebulizer solution USE 1 VIAL VIA NEBULIZER EVERY 8 HOURS Strength: 0.5-2.5 mg/3 mL 180 mL 3    latanoprost (Xalatan) 0.005 % ophthalmic solution Administer 1 drop into affected eye(s) once daily.      metoprolol tartrate (Lopressor) 25 mg tablet take half tablet twice a day 90 tablet 3    montelukast (Singulair) 10 mg tablet Take 1 tablet (10 mg) by mouth once daily in the evening. 90 tablet 3    nystatin (Mycostatin) 100,000 unit/gram powder Apply topically 2 times a day. 300 g 3    pantoprazole (ProtoNix) 40 mg EC tablet Take 1 tablet (40 mg) by mouth once daily in the morning. Take before meals. 90 tablet 3    potassium chloride CR 10 mEq ER tablet Take 1 tablet (10 mEq) by mouth once daily. 90 tablet 3    pravastatin (Pravachol) 40 mg tablet Take 1 tablet (40 mg) by mouth once daily. 90 tablet 3    spironolactone (Aldactone) 50 mg tablet Take 1 tablet (50 mg) by mouth once daily. 90 tablet 3    terazosin (Hytrin) 10 mg capsule Take 1 capsule (10 mg) by mouth once daily at bedtime. 90 capsule 3    torsemide (Demadex) 100 mg tablet Take 1 tablet (100 mg) by mouth once daily. 90 tablet 3    umeclidinium-vilanteroL (Anoro Ellipta) 62.5-25 mcg/actuation blister with device Inhale 1 puff once daily. 3 each 3    zolpidem (Ambien) 10 mg tablet Take 0.5 tablets (5 mg) by mouth as needed at bedtime for sleep. 7 tablet 0     No current facility-administered medications on file prior to visit.     DRUG INTERACTIONS  - No significant drug-drug interactions exist that require a change in therapy.   - Eliquis dose needs to be renally adjusted for atrial fibrillation. Will reach out to Dr. Orellana regarding this.    Assessment/Plan   Problem List Items Addressed This Visit          Respiratory    COPD (chronic obstructive pulmonary disease) (CMS/[facility])       Circulatory    CHF exacerbation (CMS/[facility])       Endocrine/Metabolic    Type 2 diabetes mellitus with stage 3a chronic kidney disease, without  long-term current use of insulin (CMS/[facility])     COPD ASSESSMENT  Current Pharmacotherapy:  - DuoNeb TID  - albuterol rescue inhaler  - Anoro 62.5-25 mcg/actuation  - oxygen at night    Rescue Inhaler Use:  -How many times per week do you use your rescue inhaler? ~1 time per day    Sx Management:  -Increased cough? no  -Increased sputum production? no  -Increased SOB? no    Exacerbation Hx:  -When was your last hospitalization for an exacerbation? 2022  -When was the last time you were treated with antibiotics and/or steroids? 1/10/2022    Smoking Cessation:  -Patient not currently using tobacco products  - Quit Date: Before   - Years Smokin-45    DIABETES ASSESSMENT  Current Pharmacotherapy:  - Humalog 5 units TID with meals    Secondary Prevention:  - Statin - Yes, pravastatin 40 mg  - ACE-I/ARB - No  - Aspirin - No    Historical Pharmacotherapy  - metformin    SMBG MEASUREMENTS  Patient unable to provide exact readings at time of appointment, however states fasting blood sugar ~150-170 mg/dL, and evenings around 8 pm are ~225 mg/dL.    Patient does not report symptoms of hypoglycemia.  Patient does not report symptoms of hyperglycemia.     RECOMMENDATIONS/PLAN  1. COPD:  Continue:  - DuoNeb TID  - albuterol rescue inhaler  - Anoro 62.5-25 mcg/actuation  - oxygen at night    2. Diabetes:  Patients diabetes is well controlled with most recent A1c of 7.3% (goal < 8 %).   - Continue: Humalog 5 units TID with meals for now. At follow-up, will plan to transition patient to long acting insulin for better compliance and quality of life.  - Start: Freestyle Lynda II sensor and reader - patient interested in CGM. [facility] will complete prior auth, then send in prescription at follow-up appointment.    3. Education:    PAP:  - Patient expressed increased copays with Eliquis therapy. Patient verbally reports monthly or yearly income which is less than 400% federal poverty level. Application for program has been  submitted for the following medications: Eliquis 2.5 mg BID.  - Patient has been informed that program team will be reaching out to them to discuss necessary documentation, instructed to answer phone/return voicemail.   - Patient aware this process may take up to 6 weeks.   - If approved medication must be filled through [facility] pharmacy and may be picked up or mailed to patient.      Insomnia:  - Educated patient on proper sleep regimen. Patient states he sleeps in an elevated position in a hospital bed at night, and naps in his recliner throughout the day. Patient remains compliant to Zolpidem 10 mg nightly, however is still experiencing insomnia; advised patient to follow up with PCP regarding this.    Gabapentin:  - Patient was prescribed gabapentin after having Shingles. Patient wondering if he can stop gabapentin therapy, as his shingles has resolved. Advised patient it is likely not needed any longer, however will defer to PCP on discontinuation.    Clinical Pharmacist follow up: 5/11/2023 @ 2 pm    Thank you,  Sheryl Mayo, Ayanna     Verbal consent to manage patient's drug therapy was obtained from the patient and an individual authorized to act on behalf of a patient. They were informed they may decline to participate or withdraw from participation in pharmacy services at any time.

## 2023-05-03 PROBLEM — I89.0 LYMPHEDEMA OF BOTH LOWER EXTREMITIES: Status: ACTIVE | Noted: 2018-11-20

## 2023-05-03 PROBLEM — M10.071 ACUTE IDIOPATHIC GOUT OF RIGHT FOOT: Status: ACTIVE | Noted: 2018-11-02

## 2023-05-03 PROBLEM — N40.0 BENIGN PROSTATIC HYPERPLASIA: Status: ACTIVE | Noted: 2017-10-14

## 2023-05-03 PROBLEM — R25.1 TREMOR: Status: ACTIVE | Noted: 2023-01-01

## 2023-05-03 PROBLEM — G47.9 SLEEP DISTURBANCES: Status: ACTIVE | Noted: 2023-01-01

## 2023-05-03 PROBLEM — E66.01 MORBID OBESITY (MULTI): Status: ACTIVE | Noted: 2021-03-01

## 2023-05-03 PROBLEM — C44.1292 SQUAMOUS CELL CARCINOMA OF SKIN OF LEFT LOWER EYELID, INCLUDING CANTHUS: Status: ACTIVE | Noted: 2021-03-17

## 2023-05-03 PROBLEM — I25.10 ARTERIOSCLEROSIS OF CORONARY ARTERY: Status: ACTIVE | Noted: 2017-05-18

## 2023-05-03 PROBLEM — R23.8 BLISTERS OF MULTIPLE SITES: Status: ACTIVE | Noted: 2023-01-01

## 2023-05-03 NOTE — PROGRESS NOTES
"Subjective   Patient ID:  Magno Zimmer is a 83 y.o. male patient who presents today for blisters.    Reviewed all medications by prescribing practitioner or clinical pharmacist (such as prescriptions, OTCs, herbal therapies and supplements) and documented in the medical record.     Patient denies recent changes to medication. The patient denies SOB, dizziness, headaches, nausea, vomiting, constipation, chest pain, chest pressure.    Blister  Patient denies edema, but reports persisting blisters of the left leg. He denies fevers and chills. Patient is not undergoing treatment for this issue at this time. He reports SOB upon exertion as in the past. Patient was previously on an antibiotic. He believes the blisters are not improving or declining. Patient is using compression socks.    Diabetes Mellitus: The patient presents today for a follow up of DM.The patient has not been hospitalized for this in the last 6 months.  Patient denies any side effects to the medications.   Home blood sugar records are 169-180 in the morning to 210-220 at night.    Sleep Disturbances  Patient notes sleep difficulty. He has trouble falling asleep. Patient reports he has not been on Trazodone.     Tremor  Patient notes a tremor. He is interested in following up with a neurologist.    The patient is compliant with medications.  The patient is on insulin.       Patient Care Team:  Jimi Orellana MD as PCP - General  Jimi Orellana MD as PCP - [facility] [place] [facility] Attributed Provider        Objective   Vitals:  /52   Pulse 66   Temp 36.6 °C (97.8 °F)   Ht 1.753 m (5' 9\")   Wt 133 kg (292 lb 6.4 oz)   SpO2 98%   BMI 43.18 kg/m²     Physical Exam  Vitals reviewed.   Constitutional:       Appearance: Normal appearance.   HENT:      Right Ear: Tympanic membrane and ear canal normal.      Left Ear: Tympanic membrane and ear canal normal.      Mouth/Throat:      Pharynx: Oropharynx is clear.   Eyes:      Extraocular Movements: Extraocular " movements intact.      Conjunctiva/sclera: Conjunctivae normal.      Pupils: Pupils are equal, round, and reactive to light.   Cardiovascular:      Rate and Rhythm: Normal rate and regular rhythm.      Heart sounds: Normal heart sounds.   Pulmonary:      Effort: Pulmonary effort is normal. No respiratory distress.      Breath sounds: Normal breath sounds.   Abdominal:      General: There is no distension.      Palpations: There is no mass.      Tenderness: There is no abdominal tenderness. There is no guarding or rebound.      Hernia: No hernia is present.   Musculoskeletal:      Cervical back: Neck supple.   Skin:     General: Skin is warm and dry.      Comments: Skin of the left distal leg is slightly erythematous with blisters.   Neurological:      Mental Status: He is alert and oriented to person, place, and time.             Labs reviewed from:   Below is the patient's most recent value for Albumin, ALT, AST, BUN, Calcium, Chloride, Cholesterol, CO2, Creatinine, GFR, Glucose, HDL, Hematocrit, Hemoglobin, Hemoglobin A1C, LDL, Magnesium, Phosphorus, Platelets, Potassium, PSA, Sodium, Triglycerides, and WBC.   Lab Results   Component Value Date    ALBUMIN 3.7 03/16/2023    ALT 12 03/16/2023    AST 15 03/16/2023    BUN 68 (H) 03/16/2023    CALCIUM 9.5 03/16/2023    CL 98 03/16/2023    CHOL 118 03/16/2023    CO2 27 03/16/2023    CREATININE 3.03 (H) 03/16/2023    HDL 29.5 (A) 03/16/2023    HCT 27.2 (L) 03/16/2023    HGB 8.8 (L) 03/16/2023    HGBA1C 7.3 (A) 03/16/2023    MG 2.20 01/14/2023    PHOS 3.3 09/22/2022     03/16/2023    K 4.9 03/16/2023     03/16/2023    TRIG 128 03/16/2023    WBC 11.2 03/16/2023             Assessment/Plan   Problem List Items Addressed This Visit          Nervous    Sleep disturbances    Current Assessment & Plan     Patient will undergo treatment as prescribed.         Relevant Medications    traZODone (Desyrel) 50 mg tablet       Endocrine/Metabolic    Type 2 diabetes  mellitus with stage 3a chronic kidney disease, without long-term current use of insulin (CMS/[facility]) - Primary       Other    Blisters of multiple sites    Current Assessment & Plan     Continue use of compression stocking. Begin prescribed medication.          Relevant Medications    cephalexin (Keflex) 500 mg capsule    Other Relevant Orders    Follow Up In [facility] - PCP    Tremor    Current Assessment & Plan     Patient will follow up with a neurologist.         Relevant Orders    Referral to Neurology       Follow up in: 1 month without labs prior.     Scribe Attestation  By signing my name below, I, Jade Palomares   attest that this documentation has been prepared under the direction and in the presence of Jimi Orellana MD.

## 2023-05-03 NOTE — TELEPHONE ENCOUNTER
At checkout Magno's wife called and wanted us to ask DC if Magno should continue taking the buspirone in the am and pm?, because it causes sleepiness for him .      Please advise.

## 2023-05-04 NOTE — TELEPHONE ENCOUNTER
Dr. Orellana referred patient to Dr. Pérez a Neurologist from  for tremors. Dr. Pérez's first available appointment is booking out into mid August 2023.  Ok to still schedule patient with Dr. Pérez in August or should patient be referred to another neurologist and try to get patient scheduled sooner than August?  Please advise.

## 2023-05-05 NOTE — PROGRESS NOTES
Pharmacy Post-Discharge Visit  Magno Zimmer is a 83 y.o. male was referred to Clinical Pharmacy Team to complete a post-discharge medication optimization and monitoring visit.  The patient was referred for their diabetes, COPD, and heart failure.    Referring Provider: Jimi Orellana MD    HISTORY OF PRESENT ILLNESS  Spoke with patient today regarding Eliquis  PAP application. Patient provided financial documentation at last PCP appointment, therefore will proceed with  PAP application.    Subjective   No Known Allergies    [facility] - [place], [place] - [address] [place] [address]  [address] [facility] [address]  Phone: [phone] Fax: [phone]    [id number] - [place], [place] - [address] [facility] [address]  [address] [facility] [address]  Phone: [phone] Fax: [phone]    Social History     Social History Narrative    Not on file      Diet: Did not discuss    Exercise: Patient is able to to walk ~1000 steps and has to take 1-2 breaks. This is typical for him, and he remains compliant to this regimen.    Objective     There were no vitals taken for this visit.     LAB  Lab Results   Component Value Date    BILITOT 0.4 03/16/2023    CALCIUM 9.5 03/16/2023    CO2 27 03/16/2023    CL 98 03/16/2023    CREATININE 3.03 (H) 03/16/2023    GLUCOSE 154 (H) 03/16/2023    ALKPHOS 76 03/16/2023    K 4.9 03/16/2023    PROT 6.9 03/16/2023     03/16/2023    AST 15 03/16/2023    ALT 12 03/16/2023    BUN 68 (H) 03/16/2023    ANIONGAP 16 03/16/2023    MG 2.20 01/14/2023    PHOS 3.3 09/22/2022    ALBUMIN 3.7 03/16/2023    LIPASE 41 01/14/2023     Lab Results   Component Value Date    TRIG 128 03/16/2023    CHOL 118 03/16/2023    HDL 29.5 (A) 03/16/2023     Lab Results   Component Value Date    HGBA1C 7.3 (A) 03/16/2023     Current Outpatient Medications on File Prior to Visit   Medication Sig Dispense Refill    albuterol 90 mcg/actuation inhaler Inhale 2 puffs every 6  hours if needed for wheezing.      allopurinol (Zyloprim) 300 mg tablet Take 1 tablet (300 mg) by mouth once daily. 90 tablet 3    amLODIPine (Norvasc) 5 mg tablet TAKE 1 TABLET BY MOUTH EVERY DAY UPON ARISING 90 tablet 3    B complex-vitamin C-folic acid (Nephrocaps) 1 mg capsule Take 1 capsule by mouth once daily. 90 capsule 3    brimonidine (AlphaGAN P) 0.15 % ophthalmic solution Administer 1 drop into affected eye(s) in the morning and 1 drop in the evening and 1 drop before bedtime.      busPIRone (Buspar) 10 mg tablet Take 1 tablet (10 mg) by mouth in the morning and 1 tablet (10 mg) before bedtime. 180 tablet 3    cephalexin (Keflex) 500 mg capsule Take 1 capsule (500 mg) by mouth once daily. 30 capsule 1    cyanocobalamin (Vitamin B-12) 1,000 mcg tablet Take 1 tablet (1,000 mcg) by mouth once daily.      Eliquis 5 mg tablet Take 1 tablet (5 mg) by mouth in the morning and 1 tablet (5 mg) before bedtime. 180 tablet 0    finasteride (Proscar) 5 mg tablet Take 1 tablet (5 mg) by mouth once daily. 90 tablet 3    gabapentin (Neurontin) 100 mg capsule Take 1 capsule (100 mg) by mouth once daily at bedtime. 90 capsule 3    HumaLOG KwikPen Insulin 100 unit/mL injection INJECT 5 UNIT 3 times daily with meals 300 mL 3    hydroCHLOROthiazide (HYDRODiuril) 25 mg tablet Take 1 tablet (25 mg) by mouth once daily. 90 tablet 3    ipratropium-albuteroL (Duo-Neb) 0.5-2.5 mg/3 mL nebulizer solution USE 1 VIAL VIA NEBULIZER EVERY 8 HOURS Strength: 0.5-2.5 mg/3 mL 180 mL 3    latanoprost (Xalatan) 0.005 % ophthalmic solution Administer 1 drop into affected eye(s) once daily.      metoprolol tartrate (Lopressor) 25 mg tablet take half tablet twice a day 90 tablet 3    montelukast (Singulair) 10 mg tablet Take 1 tablet (10 mg) by mouth once daily in the evening. 90 tablet 3    nystatin (Mycostatin) 100,000 unit/gram powder Apply topically 2 times a day. 300 g 3    pantoprazole (ProtoNix) 40 mg EC tablet Take 1 tablet (40 mg) by  mouth once daily in the morning. Take before meals. 90 tablet 3    potassium chloride CR 10 mEq ER tablet Take 1 tablet (10 mEq) by mouth once daily. 90 tablet 3    pravastatin (Pravachol) 40 mg tablet Take 1 tablet (40 mg) by mouth once daily. 90 tablet 3    spironolactone (Aldactone) 50 mg tablet Take 1 tablet (50 mg) by mouth once daily. 90 tablet 3    terazosin (Hytrin) 10 mg capsule Take 1 capsule (10 mg) by mouth once daily at bedtime. 90 capsule 3    torsemide (Demadex) 100 mg tablet Take 1 tablet (100 mg) by mouth once daily. 90 tablet 3    traZODone (Desyrel) 50 mg tablet Take 1 tablet (50 mg) by mouth as needed at bedtime for sleep. 30 tablet 0    umeclidinium-vilanteroL (Anoro Ellipta) 62.5-25 mcg/actuation blister with device Inhale 1 puff once daily. 3 each 3    zolpidem (Ambien) 10 mg tablet Take 0.5 tablets (5 mg) by mouth as needed at bedtime for sleep. 7 tablet 0     No current facility-administered medications on file prior to visit.     DRUG INTERACTIONS  - No significant drug-drug interactions exist that require a change in therapy.   - Eliquis dose needs to be renally adjusted for atrial fibrillation.     Assessment/Plan   Problem List Items Addressed This Visit          Circulatory    Chronic atrial fibrillation (CMS/[facility])       RECOMMENDATIONS/PLAN  1.  PAP:  - Patient expressed increased copays with Eliquis therapy. Patient verbally reports monthly or yearly income which is less than 400% federal poverty level. Application for program has been submitted for the following medications: Eliquis 2.5 mg BID.  - Patient has been informed that program team will be reaching out to them to discuss necessary documentation, instructed to answer phone/return voicemail.   - Patient aware this process may take up to 6 weeks.   - If approved medication must be filled through [facility] pharmacy and may be picked up or mailed to patient.      Clinical Pharmacist follow up: 5/11/2023 @ 2 pm    Thank  Sheryl dunn, PharmD     Verbal consent to manage patient's drug therapy was obtained from the patient and an individual authorized to act on behalf of a patient. They were informed they may decline to participate or withdraw from participation in pharmacy services at any time.

## 2023-05-18 NOTE — PROGRESS NOTES
Pharmacy Post-Discharge Visit  Magno Zimmer is a 83 y.o. male was referred to Clinical Pharmacy Team to complete a post-discharge medication optimization and monitoring visit.  The patient was referred for their diabetes, COPD, and heart failure.    Referring Provider: Jimi Orellana MD    HISTORY OF PRESENT ILLNESS  Spoke with patient today regarding [facility] PAP application. Patient provided financial documentation at last PCP appointment, however a statement of non-filing form is still needed before  Specialty team can process application.    Subjective   No Known Allergies    [facility] - [place], [place] - [address] [facility]  [address] [facility] [address]  Phone: [phone] Fax: [phone]    [id number] - [place], [place] - [address] [facility] [address]  [address] [facility] [address]  Phone: [phone] Fax: [phone]     [facility] - [place], [place] - [address]  [address]  [facility] [address]  Phone: [phone] Fax: [phone]    Social History     Social History Narrative    Not on file      Diet: Did not discuss    Exercise: Patient is able to to walk ~1000 steps and has to take 1-2 breaks. This is typical for him, and he remains compliant to this regimen.    Objective     There were no vitals taken for this visit.     LAB  Lab Results   Component Value Date    BILITOT 0.4 03/16/2023    CALCIUM 9.5 03/16/2023    CO2 27 03/16/2023    CL 98 03/16/2023    CREATININE 3.03 (H) 03/16/2023    GLUCOSE 154 (H) 03/16/2023    ALKPHOS 76 03/16/2023    K 4.9 03/16/2023    PROT 6.9 03/16/2023     03/16/2023    AST 15 03/16/2023    ALT 12 03/16/2023    BUN 68 (H) 03/16/2023    ANIONGAP 16 03/16/2023    MG 2.20 01/14/2023    PHOS 3.3 09/22/2022    ALBUMIN 3.7 03/16/2023    LIPASE 41 01/14/2023     Lab Results   Component Value Date    TRIG 128 03/16/2023    CHOL 118 03/16/2023    HDL 29.5 (A) 03/16/2023     Lab  Results   Component Value Date    HGBA1C 7.3 (A) 03/16/2023     Current Outpatient Medications on File Prior to Visit   Medication Sig Dispense Refill    albuterol 90 mcg/actuation inhaler Inhale 2 puffs every 6 hours if needed for wheezing.      allopurinol (Zyloprim) 300 mg tablet Take 1 tablet (300 mg) by mouth once daily. 90 tablet 3    amLODIPine (Norvasc) 5 mg tablet TAKE 1 TABLET BY MOUTH EVERY DAY UPON ARISING 90 tablet 3    apixaban (Eliquis) 2.5 mg tablet Take 1 tablet (2.5 mg) by mouth 2 times a day. 180 tablet 3    B complex-vitamin C-folic acid (Nephrocaps) 1 mg capsule Take 1 capsule by mouth once daily. 90 capsule 3    brimonidine (AlphaGAN P) 0.15 % ophthalmic solution Administer 1 drop into affected eye(s) in the morning and 1 drop in the evening and 1 drop before bedtime.      busPIRone (Buspar) 10 mg tablet Take 1 tablet (10 mg) by mouth in the morning and 1 tablet (10 mg) before bedtime. 180 tablet 3    cephalexin (Keflex) 500 mg capsule Take 1 capsule (500 mg) by mouth once daily. 30 capsule 1    cyanocobalamin (Vitamin B-12) 1,000 mcg tablet Take 1 tablet (1,000 mcg) by mouth once daily.      finasteride (Proscar) 5 mg tablet Take 1 tablet (5 mg) by mouth once daily. 90 tablet 3    gabapentin (Neurontin) 100 mg capsule Take 1 capsule (100 mg) by mouth once daily at bedtime. 90 capsule 3    HumaLOG KwikPen Insulin 100 unit/mL injection INJECT 5 UNIT 3 times daily with meals 300 mL 3    hydroCHLOROthiazide (HYDRODiuril) 25 mg tablet Take 1 tablet (25 mg) by mouth once daily. 90 tablet 3    ipratropium-albuteroL (Duo-Neb) 0.5-2.5 mg/3 mL nebulizer solution USE 1 VIAL VIA NEBULIZER EVERY 8 HOURS Strength: 0.5-2.5 mg/3 mL 180 mL 3    latanoprost (Xalatan) 0.005 % ophthalmic solution Administer 1 drop into affected eye(s) once daily.      metoprolol tartrate (Lopressor) 25 mg tablet take half tablet twice a day 90 tablet 3    montelukast (Singulair) 10 mg tablet Take 1 tablet (10 mg) by mouth once  daily in the evening. 90 tablet 3    nystatin (Mycostatin) 100,000 unit/gram powder Apply topically 2 times a day. 300 g 3    pantoprazole (ProtoNix) 40 mg EC tablet Take 1 tablet (40 mg) by mouth once daily in the morning. Take before meals. 90 tablet 3    potassium chloride CR 10 mEq ER tablet Take 1 tablet (10 mEq) by mouth once daily. 90 tablet 3    pravastatin (Pravachol) 40 mg tablet Take 1 tablet (40 mg) by mouth once daily. 90 tablet 3    spironolactone (Aldactone) 50 mg tablet Take 1 tablet (50 mg) by mouth once daily. 90 tablet 3    terazosin (Hytrin) 10 mg capsule Take 1 capsule (10 mg) by mouth once daily at bedtime. 90 capsule 3    torsemide (Demadex) 100 mg tablet Take 1 tablet (100 mg) by mouth once daily. 90 tablet 3    traZODone (Desyrel) 50 mg tablet Take 1 tablet (50 mg) by mouth as needed at bedtime for sleep. 30 tablet 0    umeclidinium-vilanteroL (Anoro Ellipta) 62.5-25 mcg/actuation blister with device Inhale 1 puff once daily. 3 each 3    zolpidem (Ambien) 10 mg tablet Take 0.5 tablets (5 mg) by mouth as needed at bedtime for sleep. 7 tablet 0     No current facility-administered medications on file prior to visit.     DRUG INTERACTIONS  - No significant drug-drug interactions exist that require a change in therapy.   - Eliquis dose needs to be renally adjusted for atrial fibrillation.     Assessment/Plan   Problem List Items Addressed This Visit          Respiratory    COPD (chronic obstructive pulmonary disease) (CMS/[facility])       Circulatory    CHF exacerbation (CMS/[facility])       Endocrine/Metabolic    Type 2 diabetes mellitus with stage 3a chronic kidney disease, without long-term current use of insulin (CMS/[facility])       RECOMMENDATIONS/PLAN  1.  PAP:  - Statement of Non-filing form will be mailed to patient's home. Patient will mail back to [facility], and once received, this will be submitted to  Specialty team for application processing.  - Patient states he still has Eliquis supply  left, and will be able to pay for next fill if application is not approved soon  - Patient expressed increased copays with Eliquis therapy. Patient verbally reports monthly or yearly income which is less than 400% federal poverty level. Application for program has been submitted for the following medications: Eliquis 2.5 mg BID.  - Patient has been informed that program team will be reaching out to them to discuss necessary documentation, instructed to answer phone/return voicemail.   - Patient aware this process may take up to 6 weeks.   - If approved medication must be filled through [facility] pharmacy and may be picked up or mailed to patient.      Clinical Pharmacist follow up: 5/25/2023 @ 2:30 pm    Thank you,  Sheryl Mayo, PharmWIL     Verbal consent to manage patient's drug therapy was obtained from the patient and an individual authorized to act on behalf of a patient. They were informed they may decline to participate or withdraw from participation in pharmacy services at any time.

## 2023-05-25 NOTE — PROGRESS NOTES
Pharmacy Post-Discharge Visit  Magno Zimmer is a 83 y.o. male was referred to Clinical Pharmacy Team to complete a post-discharge medication optimization and monitoring visit.  The patient was referred for their diabetes, COPD, and heart failure.    Referring Provider: Jimi Orellana MD    HISTORY OF PRESENT ILLNESS  Spoke with patient today regarding [facility] PAP application. Patient provided financial documentation at last PCP appointment, however a statement of non-filing form is still needed before  Specialty team can process application. This was mailed this week, however patient states he has not received this yet.    Subjective   No Known Allergies    [facility] - [place], [place] - [address] [facility]  [address] [facility] [address]  Phone: [phone] Fax: [phone]    [id number] - [place], [place] - [address] [facility] [address]  [address] [facility] [place] [address]  Phone: [phone] Fax: [phone]     [email or web address] - [place], [place] - [address]  [address]  [facility] [address]  Phone: [phone] Fax: [phone]    Social History     Social History Narrative    Not on file      Diet: Did not discuss    Exercise: Patient is able to to walk ~1000 steps and has to take 1-2 breaks. This is typical for him, and he remains compliant to this regimen.    Objective     There were no vitals taken for this visit.     LAB  Lab Results   Component Value Date    BILITOT 0.4 03/16/2023    CALCIUM 9.5 03/16/2023    CO2 27 03/16/2023    CL 98 03/16/2023    CREATININE 3.03 (H) 03/16/2023    GLUCOSE 154 (H) 03/16/2023    ALKPHOS 76 03/16/2023    K 4.9 03/16/2023    PROT 6.9 03/16/2023     03/16/2023    AST 15 03/16/2023    ALT 12 03/16/2023    BUN 68 (H) 03/16/2023    ANIONGAP 16 03/16/2023    MG 2.20 01/14/2023    PHOS 3.3 09/22/2022    ALBUMIN 3.7 03/16/2023    LIPASE 41 01/14/2023     Lab Results   Component Value Date     TRIG 128 03/16/2023    CHOL 118 03/16/2023    HDL 29.5 (A) 03/16/2023     Lab Results   Component Value Date    HGBA1C 7.3 (A) 03/16/2023     Current Outpatient Medications on File Prior to Visit   Medication Sig Dispense Refill    albuterol 90 mcg/actuation inhaler Inhale 2 puffs every 6 hours if needed for wheezing.      allopurinol (Zyloprim) 300 mg tablet Take 1 tablet (300 mg) by mouth once daily. 90 tablet 3    amLODIPine (Norvasc) 5 mg tablet TAKE 1 TABLET BY MOUTH EVERY DAY UPON ARISING 90 tablet 3    apixaban (Eliquis) 2.5 mg tablet Take 1 tablet (2.5 mg) by mouth 2 times a day. 180 tablet 3    B complex-vitamin C-folic acid (Nephrocaps) 1 mg capsule Take 1 capsule by mouth once daily. 90 capsule 3    brimonidine (AlphaGAN P) 0.15 % ophthalmic solution Administer 1 drop into affected eye(s) in the morning and 1 drop in the evening and 1 drop before bedtime.      busPIRone (Buspar) 10 mg tablet Take 1 tablet (10 mg) by mouth in the morning and 1 tablet (10 mg) before bedtime. 180 tablet 3    cephalexin (Keflex) 500 mg capsule Take 1 capsule (500 mg) by mouth 3 times a day for 10 days. 30 capsule 0    cyanocobalamin (Vitamin B-12) 1,000 mcg tablet Take 1 tablet (1,000 mcg) by mouth once daily.      finasteride (Proscar) 5 mg tablet Take 1 tablet (5 mg) by mouth once daily. 90 tablet 3    gabapentin (Neurontin) 100 mg capsule Take 1 capsule (100 mg) by mouth once daily at bedtime. 90 capsule 3    HumaLOG KwikPen Insulin 100 unit/mL injection INJECT 5 UNIT 3 times daily with meals 300 mL 3    hydroCHLOROthiazide (HYDRODiuril) 25 mg tablet Take 1 tablet (25 mg) by mouth once daily. 90 tablet 3    ipratropium-albuteroL (Duo-Neb) 0.5-2.5 mg/3 mL nebulizer solution USE 1 VIAL VIA NEBULIZER EVERY 8 HOURS Strength: 0.5-2.5 mg/3 mL 180 mL 3    latanoprost (Xalatan) 0.005 % ophthalmic solution Administer 1 drop into affected eye(s) once daily.      metoprolol tartrate (Lopressor) 25 mg tablet take half tablet twice a  day 90 tablet 3    montelukast (Singulair) 10 mg tablet Take 1 tablet (10 mg) by mouth once daily in the evening. 90 tablet 3    nystatin (Mycostatin) 100,000 unit/gram powder Apply topically 2 times a day. 300 g 3    pantoprazole (ProtoNix) 40 mg EC tablet Take 1 tablet (40 mg) by mouth once daily in the morning. Take before meals. 90 tablet 3    potassium chloride CR 10 mEq ER tablet Take 1 tablet (10 mEq) by mouth once daily. 90 tablet 3    pravastatin (Pravachol) 40 mg tablet Take 1 tablet (40 mg) by mouth once daily. 90 tablet 3    spironolactone (Aldactone) 50 mg tablet Take 1 tablet (50 mg) by mouth once daily. 90 tablet 3    terazosin (Hytrin) 10 mg capsule Take 1 capsule (10 mg) by mouth once daily at bedtime. 90 capsule 3    torsemide (Demadex) 100 mg tablet Take 1 tablet (100 mg) by mouth once daily. 90 tablet 3    traZODone (Desyrel) 50 mg tablet Take 1 tablet (50 mg) by mouth as needed at bedtime for sleep. 30 tablet 0    umeclidinium-vilanteroL (Anoro Ellipta) 62.5-25 mcg/actuation blister with device Inhale 1 puff once daily. 3 each 3    zolpidem (Ambien) 10 mg tablet Take 0.5 tablets (5 mg) by mouth as needed at bedtime for sleep. 7 tablet 0    [DISCONTINUED] cephalexin (Keflex) 500 mg capsule Take 1 capsule (500 mg) by mouth once daily. 30 capsule 1     No current facility-administered medications on file prior to visit.     DRUG INTERACTIONS  - No significant drug-drug interactions exist that require a change in therapy.   - Eliquis dose needs to be renally adjusted for atrial fibrillation.     Assessment/Plan   Problem List Items Addressed This Visit          Respiratory    COPD (chronic obstructive pulmonary disease) (CMS/HCC)       Circulatory    CHF exacerbation (CMS/HCC)       Endocrine/Metabolic    Type 2 diabetes mellitus with stage 3a chronic kidney disease, without long-term current use of insulin (CMS/HCC)       RECOMMENDATIONS/PLAN  1.  PAP:  - Statement of Non-filing form will be  mailed to patient's home. Patient will mail back to [facility], and once received, this will be submitted to  Specialty team for application processing.  - Patient states he still has Eliquis supply left, and will be able to pay for next fill if application is not approved soon  - Patient expressed increased copays with Eliquis therapy. Patient verbally reports monthly or yearly income which is less than 400% federal poverty level. Application for program has been submitted for the following medications: Eliquis 2.5 mg BID.  - Patient has been informed that program team will be reaching out to them to discuss necessary documentation, instructed to answer phone/return voicemail.   - Patient aware this process may take up to 6 weeks.   - If approved medication must be filled through [facility] pharmacy and may be picked up or mailed to patient.      Clinical Pharmacist follow up: 5/30/2023 @ 3:30 pm    Thank you,  Sheryl Mayo, PharmD     Verbal consent to manage patient's drug therapy was obtained from the patient and an individual authorized to act on behalf of a patient. They were informed they may decline to participate or withdraw from participation in pharmacy services at any time.

## 2023-05-31 NOTE — PROGRESS NOTES
Pharmacy Post-Discharge Visit  Magno Zimmer is a 83 y.o. male was referred to Clinical Pharmacy Team to complete a post-discharge medication optimization and monitoring visit.  The patient was referred for their diabetes, COPD, and heart failure.    Referring Provider: Jimi Orellana MD    HISTORY OF PRESENT ILLNESS  Spoke with patient today regarding [facility] PAP application. Patient provided financial documentation at last PCP appointment, however a statement of non-filing form is still needed before  Specialty team can process application. This was mailed last week and patient states he mailed back the application last week.    Subjective   No Known Allergies    [facility] - [place], [place] - [address] [facility]  [address] [facility] [address]  Phone: [phone] Fax: [phone]    [id number] - [place], [place] - [address] [facility] [address]  [address] [facility] [place] [address]  Phone: [phone] Fax: [phone]     [facility] - [place], [place] - [address]  [address]  [facility] [address]  Phone: [phone] Fax: [phone]    Social History     Social History Narrative    Not on file      Diet: Did not discuss    Exercise: Patient is able to to walk ~1000 steps and has to take 1-2 breaks. This is typical for him, and he remains compliant to this regimen.    Objective     There were no vitals taken for this visit.     LAB  Lab Results   Component Value Date    BILITOT 0.4 03/16/2023    CALCIUM 9.5 03/16/2023    CO2 27 03/16/2023    CL 98 03/16/2023    CREATININE 3.03 (H) 03/16/2023    GLUCOSE 154 (H) 03/16/2023    ALKPHOS 76 03/16/2023    K 4.9 03/16/2023    PROT 6.9 03/16/2023     03/16/2023    AST 15 03/16/2023    ALT 12 03/16/2023    BUN 68 (H) 03/16/2023    ANIONGAP 16 03/16/2023    MG 2.20 01/14/2023    PHOS 3.3 09/22/2022    ALBUMIN 3.7 03/16/2023    LIPASE 41 01/14/2023     Lab Results   Component Value  Date    TRIG 128 2023    CHOL 118 2023    HDL 29.5 (A) 2023     Lab Results   Component Value Date    HGBA1C 7.3 (A) 2023     Current Outpatient Medications on File Prior to Visit   Medication Sig Dispense Refill    albuterol 90 mcg/actuation inhaler Inhale 2 puffs every 6 hours if needed for wheezing.      allopurinol (Zyloprim) 300 mg tablet Take 1 tablet (300 mg) by mouth once daily. 90 tablet 3    amLODIPine (Norvasc) 5 mg tablet TAKE 1 TABLET BY MOUTH EVERY DAY UPON ARISING 90 tablet 3    apixaban (Eliquis) 2.5 mg tablet Take 1 tablet (2.5 mg) by mouth 2 times a day. 180 tablet 3    B complex-vitamin C-folic acid (Nephrocaps) 1 mg capsule Take 1 capsule by mouth once daily. 90 capsule 3    brimonidine (AlphaGAN P) 0.15 % ophthalmic solution Administer 1 drop into affected eye(s) in the morning and 1 drop in the evening and 1 drop before bedtime.      busPIRone (Buspar) 10 mg tablet Take 1 tablet (10 mg) by mouth in the morning and 1 tablet (10 mg) before bedtime. 180 tablet 3    [] cephalexin (Keflex) 500 mg capsule Take 1 capsule (500 mg) by mouth 3 times a day for 10 days. 30 capsule 0    cyanocobalamin (Vitamin B-12) 1,000 mcg tablet Take 1 tablet (1,000 mcg) by mouth once daily.      finasteride (Proscar) 5 mg tablet Take 1 tablet (5 mg) by mouth once daily. 90 tablet 3    gabapentin (Neurontin) 100 mg capsule Take 1 capsule (100 mg) by mouth once daily at bedtime. 90 capsule 3    HumaLOG KwikPen Insulin 100 unit/mL injection INJECT 5 UNIT 3 times daily with meals 300 mL 3    hydroCHLOROthiazide (HYDRODiuril) 25 mg tablet Take 1 tablet (25 mg) by mouth once daily. 90 tablet 3    ipratropium-albuteroL (Duo-Neb) 0.5-2.5 mg/3 mL nebulizer solution USE 1 VIAL VIA NEBULIZER EVERY 8 HOURS Strength: 0.5-2.5 mg/3 mL 180 mL 3    latanoprost (Xalatan) 0.005 % ophthalmic solution Administer 1 drop into affected eye(s) once daily.      metoprolol tartrate (Lopressor) 25 mg tablet take  half tablet twice a day 90 tablet 3    montelukast (Singulair) 10 mg tablet Take 1 tablet (10 mg) by mouth once daily in the evening. 90 tablet 3    nystatin (Mycostatin) 100,000 unit/gram powder Apply topically 2 times a day. 300 g 3    pantoprazole (ProtoNix) 40 mg EC tablet Take 1 tablet (40 mg) by mouth once daily in the morning. Take before meals. 90 tablet 3    potassium chloride CR 10 mEq ER tablet Take 1 tablet (10 mEq) by mouth once daily. 90 tablet 3    pravastatin (Pravachol) 40 mg tablet Take 1 tablet (40 mg) by mouth once daily. 90 tablet 3    spironolactone (Aldactone) 50 mg tablet Take 1 tablet (50 mg) by mouth once daily. 90 tablet 3    terazosin (Hytrin) 10 mg capsule Take 1 capsule (10 mg) by mouth once daily at bedtime. 90 capsule 3    torsemide (Demadex) 100 mg tablet Take 1 tablet (100 mg) by mouth once daily. 90 tablet 3    traZODone (Desyrel) 50 mg tablet TAKE 1 TABLET (50 MG) BY MOUTH AS NEEDED AT BEDTIME FOR SLEEP. 30 tablet 0    umeclidinium-vilanteroL (Anoro Ellipta) 62.5-25 mcg/actuation blister with device Inhale 1 puff once daily. 3 each 3    zolpidem (Ambien) 10 mg tablet Take 0.5 tablets (5 mg) by mouth as needed at bedtime for sleep. 7 tablet 0    [DISCONTINUED] traZODone (Desyrel) 50 mg tablet Take 1 tablet (50 mg) by mouth as needed at bedtime for sleep. 30 tablet 0     No current facility-administered medications on file prior to visit.     DRUG INTERACTIONS  - No significant drug-drug interactions exist that require a change in therapy.   - Eliquis dose needs to be renally adjusted for atrial fibrillation.     Assessment/Plan   Problem List Items Addressed This Visit          Respiratory    COPD (chronic obstructive pulmonary disease) (CMS/HCC)       Circulatory    CHF exacerbation (CMS/HCC)       Endocrine/Metabolic    Type 2 diabetes mellitus with stage 3a chronic kidney disease, without long-term current use of insulin (CMS/HCC)       RECOMMENDATIONS/PLAN  1.  PAP:  -  Patient expressed increased copays with Eliquis therapy. Patient verbally reports monthly or yearly income which is less than 400% federal poverty level. Application for program has been submitted for the following medications: Eliquis 2.5 mg BID.  - Patient has been informed that program team will be reaching out to them to discuss necessary documentation, instructed to answer phone/return voicemail.   - Patient aware this process may take up to 6 weeks.   - If approved medication must be filled through [facility] pharmacy and may be picked up or mailed to patient.      Clinical Pharmacist follow up: 6/15/2023 @ 3:30 pm    Thank you,  Sheryl Mayo, PharmD     Verbal consent to manage patient's drug therapy was obtained from the patient and an individual authorized to act on behalf of a patient. They were informed they may decline to participate or withdraw from participation in pharmacy services at any time.

## 2023-06-12 PROBLEM — K63.1 SMALL BOWEL PERFORATION (MULTI): Status: RESOLVED | Noted: 2023-01-01 | Resolved: 2023-01-01

## 2023-06-12 PROBLEM — R07.9 CHEST PAIN: Status: ACTIVE | Noted: 2023-01-01

## 2023-06-12 PROBLEM — I87.2 EDEMA OF RIGHT LOWER LEG DUE TO PERIPHERAL VENOUS INSUFFICIENCY: Status: ACTIVE | Noted: 2018-01-18

## 2023-06-12 PROBLEM — L03.116 CELLULITIS OF LEFT LEG: Status: RESOLVED | Noted: 2023-01-01 | Resolved: 2023-01-01

## 2023-06-12 PROBLEM — N17.9 ACUTE KIDNEY INJURY SUPERIMPOSED ON CKD (CMS-HCC): Status: ACTIVE | Noted: 2023-01-01

## 2023-06-12 PROBLEM — N18.9 ACUTE KIDNEY INJURY SUPERIMPOSED ON CKD (CMS-HCC): Status: ACTIVE | Noted: 2023-01-01

## 2023-06-12 PROBLEM — M10.00 IDIOPATHIC GOUT: Status: ACTIVE | Noted: 2022-03-22

## 2023-06-12 PROBLEM — R21 RASH: Status: RESOLVED | Noted: 2023-01-01 | Resolved: 2023-01-01

## 2023-06-12 PROBLEM — J18.9 PNEUMONIA: Status: ACTIVE | Noted: 2023-01-01

## 2023-06-12 PROBLEM — R60.9 PERIPHERAL EDEMA: Status: ACTIVE | Noted: 2021-03-01

## 2023-06-12 PROBLEM — J96.91 RESPIRATORY FAILURE WITH HYPOXIA (MULTI): Status: ACTIVE | Noted: 2023-01-01

## 2023-06-12 PROBLEM — Z00.00 ROUTINE GENERAL MEDICAL EXAMINATION AT HEALTH CARE FACILITY: Status: RESOLVED | Noted: 2023-01-01 | Resolved: 2023-01-01

## 2023-06-12 PROBLEM — R60.0 EDEMA OF RIGHT LOWER LEG DUE TO PERIPHERAL VENOUS INSUFFICIENCY: Status: ACTIVE | Noted: 2018-01-18

## 2023-06-12 NOTE — LETTER
Patient: Magno Zimmer  : 1940    Encounter Date: 2023    MRN:[id number]     Subjective  Magno Zimmer is a 83 y.o. male who is seen today for hospital follow-up.  He was admitted to [facility] on 2023 with community acquired pneumonia and exacerbation of CHF.  He was treated with IV antibiotics and diuresis along with breathing treatments.  He was found to be hyponatremic and nephro was consulted for management.  He also had RAKESH on CKD which is also improved.  Patient was discharged to [facility] on 2023 for PT/OT/SN.  Review of Systems   Constitutional:  Negative for fatigue and fever.   Respiratory:  Negative for cough, chest tightness and shortness of breath.    Cardiovascular:  Negative for chest pain, palpitations and leg swelling.    Objective  Lab Results   Component Value Date    GLUCOSE 166 (H) 2023    CALCIUM 9.0 2023     (L) 2023    K 5.0 2023    CO2 27 2023     2023    BUN 79 (H) 2023    CREATININE 2.11 (H) 2023      Lab Results   Component Value Date    WBC 13.3 (H) 2023    HGB 8.3 (L) 2023    HCT 26.4 (L) 2023     (H) 2023     2023      Lab Results   Component Value Date    HGBA1C 7.3 (A) 2023   Physical Exam  Constitutional:       Appearance: He is morbidly obese.   Cardiovascular:      Rate and Rhythm: Normal rate. Rhythm irregularly irregular.      Heart sounds: No murmur heard.     Comments: Wearing BLE compression stockings and legs are elevated.  Pulmonary:      Effort: Pulmonary effort is normal.      Breath sounds: Examination of the right-lower field reveals wheezing. Examination of the left-lower field reveals wheezing. Wheezing present.      Comments: End-expiratory wheezing in bilateral posterior lung fields.   Abdominal:      General: Bowel sounds are normal.      Palpations: Abdomen is soft.   Musculoskeletal:      Right lower leg: No edema.       Left lower leg: No edema.   Skin:     General: Skin is warm and dry.   Neurological:      General: No focal deficit present.      Mental Status: He is alert and oriented to person, place, and time.   Psychiatric:         Mood and Affect: Mood normal.     Assessment   1. Community acquired pneumonia, unspecified laterality      Antibiotics. Weaned O2 from 4L to 2L now. Nursing to wean off to keep SpO2 >93%. Continue 2L at HS per home regimen. Add routine duonebs Q8H x5 days, then PRN.      2. Acute kidney injury superimposed on CKD (CMS/[facility])      Was quite severe and GFR was down to 20 but improved to 30  (creatinine 2.11) by hospital D/C.  Repeat BMP on 6/13.  Avoid nephrotoxins      3. Physical deconditioning      PT/OT eval and treat. Doing well w/ therapy per pt. stating he walked to therapy room without becoming SOB on exertion.      4. Acute on chronic congestive heart failure, unspecified heart failure type (CMS/[facility])      Managed w/ Jardiance, bblocker, Bumex. F/U w/ Dr. Farrell in 1 week.      5. Hyponatremia      Nephrology was consulted and this improved to 134 from low of 127. BMP 6/13.      6. Type 2 diabetes mellitus with stage 3a chronic kidney disease, without long-term current use of insulin (CMS/[facility])      Managed w/ Jardiance and prandial humalog. A1C 7.3% 3/2023.      7. Chronic atrial fibrillation (CMS/[facility])      Managed w/ bblocker and Eliquis. Rate controlled, but remains in a-fib.      8. HTN (hypertension), benign        9. Arteriosclerosis of coronary artery        10. RADAMES (obstructive sleep apnea)        11. COPD, severe (CMS/[facility])      Managed w/ Anoro Ellipta.         Meds, orders, nursing notes reviewed.  Nurse will monitor and update physician of any change: Discussed with patient and provided information.  Supportive care will be given.  Dr. Cruz will follow-up tomorrow.      Electronically Signed By: FAITH Dallas   6/12/23  9:51 PM

## 2023-06-12 NOTE — PROGRESS NOTES
MRN:[id number]     Subjective   Magno Zimmer is a 83 y.o. male who is seen today for hospital follow-up.  He was admitted to [facility] on 6/1/2023 with community acquired pneumonia and exacerbation of CHF.  He was treated with IV antibiotics and diuresis along with breathing treatments.  He was found to be hyponatremic and nephro was consulted for management.  He also had RAKESH on CKD which is also improved.  Patient was discharged to [facility] on 6/8/2023 for PT/OT/SN.  Review of Systems   Constitutional:  Negative for fatigue and fever.   Respiratory:  Negative for cough, chest tightness and shortness of breath.    Cardiovascular:  Negative for chest pain, palpitations and leg swelling.    Objective   Lab Results   Component Value Date    GLUCOSE 166 (H) 06/08/2023    CALCIUM 9.0 06/08/2023     (L) 06/08/2023    K 5.0 06/08/2023    CO2 27 06/08/2023     06/08/2023    BUN 79 (H) 06/08/2023    CREATININE 2.11 (H) 06/08/2023      Lab Results   Component Value Date    WBC 13.3 (H) 06/08/2023    HGB 8.3 (L) 06/08/2023    HCT 26.4 (L) 06/08/2023     (H) 06/08/2023     06/08/2023      Lab Results   Component Value Date    HGBA1C 7.3 (A) 03/16/2023   Physical Exam  Constitutional:       Appearance: He is morbidly obese.   Cardiovascular:      Rate and Rhythm: Normal rate. Rhythm irregularly irregular.      Heart sounds: No murmur heard.     Comments: Wearing BLE compression stockings and legs are elevated.  Pulmonary:      Effort: Pulmonary effort is normal.      Breath sounds: Examination of the right-lower field reveals wheezing. Examination of the left-lower field reveals wheezing. Wheezing present.      Comments: End-expiratory wheezing in bilateral posterior lung fields.   Abdominal:      General: Bowel sounds are normal.      Palpations: Abdomen is soft.   Musculoskeletal:      Right lower leg: No edema.      Left lower leg: No edema.   Skin:     General: Skin is warm and dry.    Neurological:      General: No focal deficit present.      Mental Status: He is alert and oriented to person, place, and time.   Psychiatric:         Mood and Affect: Mood normal.     Assessment    1. Community acquired pneumonia, unspecified laterality      Antibiotics. Weaned O2 from 4L to 2L now. Nursing to wean off to keep SpO2 >93%. Continue 2L at HS per home regimen. Add routine duonebs Q8H x5 days, then PRN.      2. Acute kidney injury superimposed on CKD (CMS/[facility])      Was quite severe and GFR was down to 20 but improved to 30  (creatinine 2.11) by hospital D/C.  Repeat BMP on 6/13.  Avoid nephrotoxins      3. Physical deconditioning      PT/OT eval and treat. Doing well w/ therapy per pt. stating he walked to therapy room without becoming SOB on exertion.      4. Acute on chronic congestive heart failure, unspecified heart failure type (CMS/[facility])      Managed w/ Jardiance, bblocker, Bumex. F/U w/ Dr. Farrell in 1 week.      5. Hyponatremia      Nephrology was consulted and this improved to 134 from low of 127. BMP 6/13.      6. Type 2 diabetes mellitus with stage 3a chronic kidney disease, without long-term current use of insulin (CMS/[facility])      Managed w/ Jardiance and prandial humalog. A1C 7.3% 3/2023.      7. Chronic atrial fibrillation (CMS/[facility])      Managed w/ bblocker and Eliquis. Rate controlled, but remains in a-fib.      8. HTN (hypertension), benign        9. Arteriosclerosis of coronary artery        10. RADAMES (obstructive sleep apnea)        11. COPD, severe (CMS/[facility])      Managed w/ Anoro Ellipta.         Meds, orders, nursing notes reviewed.  Nurse will monitor and update physician of any change: Discussed with patient and provided information.  Supportive care will be given.  Dr. Cruz will follow-up tomorrow.

## 2023-06-13 PROBLEM — N18.4 STAGE 4 CHRONIC KIDNEY DISEASE (MULTI): Status: ACTIVE | Noted: 2023-01-01

## 2023-06-13 PROBLEM — R60.9 PERIPHERAL EDEMA: Status: RESOLVED | Noted: 2021-03-01 | Resolved: 2023-01-01

## 2023-06-13 PROBLEM — M10.071 ACUTE IDIOPATHIC GOUT OF RIGHT FOOT: Status: RESOLVED | Noted: 2018-11-02 | Resolved: 2023-01-01

## 2023-06-13 PROBLEM — R23.8 BLISTERS OF MULTIPLE SITES: Status: RESOLVED | Noted: 2023-01-01 | Resolved: 2023-01-01

## 2023-06-13 PROBLEM — S81.802S LEG WOUND, LEFT, SEQUELA: Status: RESOLVED | Noted: 2023-01-01 | Resolved: 2023-01-01

## 2023-06-13 PROBLEM — N18.4 ANEMIA DUE TO STAGE 4 CHRONIC KIDNEY DISEASE (MULTI): Status: ACTIVE | Noted: 2023-01-01

## 2023-06-13 PROBLEM — N17.9 ACUTE KIDNEY INJURY SUPERIMPOSED ON CKD (CMS-HCC): Status: RESOLVED | Noted: 2023-01-01 | Resolved: 2023-01-01

## 2023-06-13 PROBLEM — N18.9 ACUTE KIDNEY INJURY SUPERIMPOSED ON CKD (CMS-HCC): Status: RESOLVED | Noted: 2023-01-01 | Resolved: 2023-01-01

## 2023-06-13 PROBLEM — J18.9 PNEUMONIA: Status: RESOLVED | Noted: 2023-01-01 | Resolved: 2023-01-01

## 2023-06-13 PROBLEM — R07.9 CHEST PAIN: Status: RESOLVED | Noted: 2023-01-01 | Resolved: 2023-01-01

## 2023-06-13 PROBLEM — R60.0 EDEMA OF RIGHT LOWER LEG DUE TO PERIPHERAL VENOUS INSUFFICIENCY: Status: RESOLVED | Noted: 2018-01-18 | Resolved: 2023-01-01

## 2023-06-13 PROBLEM — I50.9 CHF EXACERBATION (MULTI): Status: RESOLVED | Noted: 2023-01-01 | Resolved: 2023-01-01

## 2023-06-13 PROBLEM — R29.898 LEFT HAND WEAKNESS: Status: RESOLVED | Noted: 2023-01-01 | Resolved: 2023-01-01

## 2023-06-13 PROBLEM — R53.81 PHYSICAL DECONDITIONING: Status: RESOLVED | Noted: 2023-01-01 | Resolved: 2023-01-01

## 2023-06-13 PROBLEM — D63.1 ANEMIA DUE TO STAGE 4 CHRONIC KIDNEY DISEASE (MULTI): Status: ACTIVE | Noted: 2023-01-01

## 2023-06-13 PROBLEM — K56.609 SMALL BOWEL OBSTRUCTION (MULTI): Status: RESOLVED | Noted: 2023-01-01 | Resolved: 2023-01-01

## 2023-06-13 PROBLEM — I87.2 EDEMA OF RIGHT LOWER LEG DUE TO PERIPHERAL VENOUS INSUFFICIENCY: Status: RESOLVED | Noted: 2018-01-18 | Resolved: 2023-01-01

## 2023-06-13 PROBLEM — J96.91 RESPIRATORY FAILURE WITH HYPOXIA (MULTI): Status: RESOLVED | Noted: 2023-01-01 | Resolved: 2023-01-01

## 2023-06-13 PROBLEM — M1A.0710 IDIOPATHIC CHRONIC GOUT OF RIGHT FOOT WITHOUT TOPHUS: Status: RESOLVED | Noted: 2023-01-01 | Resolved: 2023-01-01

## 2023-06-13 PROBLEM — N19 UREMIC ENCEPHALOPATHY: Status: RESOLVED | Noted: 2023-01-01 | Resolved: 2023-01-01

## 2023-06-13 PROBLEM — G93.49 UREMIC ENCEPHALOPATHY: Status: RESOLVED | Noted: 2023-01-01 | Resolved: 2023-01-01

## 2023-06-13 PROBLEM — J69.0 ASPIRATION PNEUMONIA (MULTI): Status: RESOLVED | Noted: 2023-01-01 | Resolved: 2023-01-01

## 2023-06-13 PROBLEM — G47.9 SLEEP DISTURBANCES: Status: RESOLVED | Noted: 2023-01-01 | Resolved: 2023-01-01

## 2023-06-13 NOTE — ASSESSMENT & PLAN NOTE
He reports quitting smoking more than 20 years ago.  His chronic COPD is managed with Anoro and DuoNebs.  He appears to be back at baseline.  We will continue to taper off the oxygen which he previously used only at nighttime

## 2023-06-13 NOTE — LETTER
Patient: Magno Zimmer  : 1940    Encounter Date: 2023    Visit  Note   Subjective  Magno Zimmer is a 83 y.o. male who is being seen and evaluated for multiple medical problems. Nursing notes, vital signs, and labs were re this 83-year-old male was previously living in an independent living apartment when he developed pneumonia identified on x-ray as patchy infiltrates in the lower viewed in the local facility chart.  Lobes bilaterally.  He was treated and completed a course of antibiotics in the hospital and received supportive nebulized care and oxygen care.  He comes here for ongoing oxygen therapy and physical therapy rehabilitation.  He states that he is no longer coughing and does not feel short of breath.  He is able to ambulate and perform activities of daily living without dyspnea.  He denies any chest pain.  He plans to return to his independent living apartment when he is able.  HPI   Objective  There were no vitals taken for this visit.  Physical Exam  Constitutional:       Appearance: Normal appearance.   HENT:      Head: Normocephalic.   Eyes:      Conjunctiva/sclera: Conjunctivae normal.   Cardiovascular:      Rate and Rhythm: Normal rate and regular rhythm.   Pulmonary:      Effort: Pulmonary effort is normal.      Breath sounds: Rhonchi present. No wheezing.   Musculoskeletal:      Cervical back: Neck supple.      Right lower leg: Edema present.      Left lower leg: Edema present.   Skin:     General: Skin is warm and dry.   Neurological:      Mental Status: He is alert.       Assessment/Plan  Problem List Items Addressed This Visit       Chronic atrial fibrillation (CMS/HCC)     Rate is well controlled today and he appears to be in sinus rhythm on auscultation today.  We will continue Eliquis for embolism protection         COPD, severe (CMS/HCC)     He reports quitting smoking more than 20 years ago.  His chronic COPD is managed with Anoro and DuoNebs.  He appears to be back  at baseline.  We will continue to taper off the oxygen which he previously used only at nighttime         HTN (hypertension), benign     This problem is well controlled. We will continue the current treatment plan.            Type 2 diabetes mellitus with stage 3a chronic kidney disease, without long-term current use of insulin (CMS/[facility])     Lab Results   Component Value Date    HGBA1C 7.3 (A) 03/16/2023   He remains very close to goal.  We will continue his 5 unit R insulin at mealtime as well as his oral medications.         Stage 4 chronic kidney disease (CMS/[facility])     At discharge form hospital his GFR is 29. We will avoid nephrotoxins and monitor         Anemia due to stage 4 chronic kidney disease (CMS/[facility])     Hgb at time of admission here is at his baseline between 8-9 and will be monitored as needed.  He is asymptomatic          Other Visit Diagnoses       Pneumonia of both lower lobes due to infectious organism    -  Primary    completed abx and here for rehab and strengthening prior to [place].  Will cont to wean from oxygen.  Lungs clear today             Electronically Signed By: Aguilar Cruz MD   6/13/23 11:37 AM

## 2023-06-13 NOTE — ASSESSMENT & PLAN NOTE
Rate is well controlled today and he appears to be in sinus rhythm on auscultation today.  We will continue Eliquis for embolism protection

## 2023-06-13 NOTE — ASSESSMENT & PLAN NOTE
Hgb at time of admission here is at his baseline between 8-9 and will be monitored as needed.  He is asymptomatic

## 2023-06-13 NOTE — PROGRESS NOTES
Visit  Note   Subjective   Magno Zimmer is a 83 y.o. male who is being seen and evaluated for multiple medical problems. Nursing notes, vital signs, and labs were re this 83-year-old male was previously living in an independent living apartment when he developed pneumonia identified on x-ray as patchy infiltrates in the lower viewed in the local facility chart.  Lobes bilaterally.  He was treated and completed a course of antibiotics in the hospital and received supportive nebulized care and oxygen care.  He comes here for ongoing oxygen therapy and physical therapy rehabilitation.  He states that he is no longer coughing and does not feel short of breath.  He is able to ambulate and perform activities of daily living without dyspnea.  He denies any chest pain.  He plans to return to his independent living apartment when he is able.  HPI   Objective   There were no vitals taken for this visit.  Physical Exam  Constitutional:       Appearance: Normal appearance.   HENT:      Head: Normocephalic.   Eyes:      Conjunctiva/sclera: Conjunctivae normal.   Cardiovascular:      Rate and Rhythm: Normal rate and regular rhythm.   Pulmonary:      Effort: Pulmonary effort is normal.      Breath sounds: Rhonchi present. No wheezing.   Musculoskeletal:      Cervical back: Neck supple.      Right lower leg: Edema present.      Left lower leg: Edema present.   Skin:     General: Skin is warm and dry.   Neurological:      Mental Status: He is alert.       Assessment/Plan   Problem List Items Addressed This Visit       Chronic atrial fibrillation (CMS/HCC)     Rate is well controlled today and he appears to be in sinus rhythm on auscultation today.  We will continue Eliquis for embolism protection         COPD, severe (CMS/HCC)     He reports quitting smoking more than 20 years ago.  His chronic COPD is managed with Anoro and DuoNebs.  He appears to be back at baseline.  We will continue to taper off the oxygen which he  previously used only at nighttime         HTN (hypertension), benign     This problem is well controlled. We will continue the current treatment plan.            Type 2 diabetes mellitus with stage 3a chronic kidney disease, without long-term current use of insulin (CMS/[facility])     Lab Results   Component Value Date    HGBA1C 7.3 (A) 03/16/2023   He remains very close to goal.  We will continue his 5 unit R insulin at mealtime as well as his oral medications.         Stage 4 chronic kidney disease (CMS/[facility])     At discharge form hospital his GFR is 29. We will avoid nephrotoxins and monitor         Anemia due to stage 4 chronic kidney disease (CMS/[facility])     Hgb at time of admission here is at his baseline between 8-9 and will be monitored as needed.  He is asymptomatic          Other Visit Diagnoses       Pneumonia of both lower lobes due to infectious organism    -  Primary    completed abx and here for rehab and strengthening prior to [place].  Will cont to wean from oxygen.  Lungs clear today

## 2023-06-13 NOTE — ASSESSMENT & PLAN NOTE
Lab Results   Component Value Date    HGBA1C 7.3 (A) 03/16/2023     He remains very close to goal.  We will continue his 5 unit R insulin at mealtime as well as his oral medications.

## 2023-06-15 NOTE — PROGRESS NOTES
Pharmacy Post-Discharge Visit  Magno Zimmer is a 83 y.o. male was referred to Clinical Pharmacy Team to complete a post-discharge medication optimization and monitoring visit.  The patient was referred for their diabetes, COPD, and heart failure.    Referring Provider: Jimi Orellana MD    HISTORY OF PRESENT ILLNESS  Spoke with patient today regarding recent hospitalization for pneumonia. Patient states he is doing much better since discharge, and now resides in assisted living facility/likely to be discharged home in the next two weeks. Reviewed recent medication changes including: bumetanide 2 mg BID (to replace torsemide), omeprazole (to replace pantoprazole), and Jardiance 10 mg daily (new medication).    Notified patient regarding Eliquis  PAP application - still pending.    Subjective   No Known Allergies    [facility] - [place], [place] - [address] [place], [place] [address] [facility] [address]  Phone: [phone] Fax: [phone]    [facility]/pharmacy #[id number] - [place], [place] - [address] [facility] [address]  [address] [facility] [address]  Phone: [phone] Fax: [phone]    [facility] - [place], [place] - [address] [facility] [address]  [address] [facility] [address]  [facility] [address]  Phone: [phone] Fax: [phone]    Social History     Social History Narrative    Not on file      Diet: Did not discuss    Exercise: Patient is able to to walk ~1000 steps and has to take 1-2 breaks. This is typical for him, and he remains compliant to this regimen.    Objective     There were no vitals taken for this visit.     LAB  Lab Results   Component Value Date    BILITOT 0.4 06/08/2023    CALCIUM 9.0 06/08/2023    CO2 27 06/08/2023     06/08/2023    CREATININE 2.11 (H) 06/08/2023    GLUCOSE 166 (H) 06/08/2023    ALKPHOS 66 06/08/2023    K 5.0 06/08/2023    PROT 6.2 (L) 06/08/2023     (L) 06/08/2023    AST 10 06/08/2023    ALT 10 06/08/2023    BUN 79 (H) 06/08/2023     ANIONGAP 11 06/08/2023    MG 2.68 (H) 06/08/2023    PHOS 3.3 09/22/2022    ALBUMIN 3.5 06/08/2023    LIPASE 41 01/14/2023     Lab Results   Component Value Date    TRIG 128 03/16/2023    CHOL 118 03/16/2023    HDL 29.5 (A) 03/16/2023     Lab Results   Component Value Date    HGBA1C 7.3 (A) 03/16/2023     Current Outpatient Medications on File Prior to Visit   Medication Sig Dispense Refill    albuterol 90 mcg/actuation inhaler Inhale 2 puffs every 6 hours if needed for wheezing.      allopurinol (Zyloprim) 300 mg tablet Take 1 tablet (300 mg) by mouth once daily. 90 tablet 3    amLODIPine (Norvasc) 5 mg tablet TAKE 1 TABLET BY MOUTH EVERY DAY UPON ARISING 90 tablet 3    apixaban (Eliquis) 2.5 mg tablet Take 1 tablet (2.5 mg) by mouth 2 times a day. 180 tablet 3    B complex-vitamin C-folic acid (Nephrocaps) 1 mg capsule Take 1 capsule by mouth once daily. 90 capsule 3    brimonidine (AlphaGAN P) 0.15 % ophthalmic solution Administer 1 drop into affected eye(s) in the morning and 1 drop in the evening and 1 drop before bedtime.      busPIRone (Buspar) 10 mg tablet Take 1 tablet (10 mg) by mouth in the morning and 1 tablet (10 mg) before bedtime. 180 tablet 3    cyanocobalamin (Vitamin B-12) 1,000 mcg tablet Take 1 tablet (1,000 mcg) by mouth once daily.      finasteride (Proscar) 5 mg tablet Take 1 tablet (5 mg) by mouth once daily. 90 tablet 3    gabapentin (Neurontin) 100 mg capsule Take 1 capsule (100 mg) by mouth once daily at bedtime. 90 capsule 3    HumaLOG KwikPen Insulin 100 unit/mL injection INJECT 5 UNIT 3 times daily with meals 300 mL 3    hydroCHLOROthiazide (HYDRODiuril) 25 mg tablet Take 1 tablet (25 mg) by mouth once daily. 90 tablet 3    ipratropium-albuteroL (Duo-Neb) 0.5-2.5 mg/3 mL nebulizer solution USE 1 VIAL VIA NEBULIZER EVERY 8 HOURS Strength: 0.5-2.5 mg/3 mL 180 mL 3    latanoprost (Xalatan) 0.005 % ophthalmic solution Administer 1 drop into affected eye(s) once daily.      metoprolol  tartrate (Lopressor) 25 mg tablet take half tablet twice a day 90 tablet 3    montelukast (Singulair) 10 mg tablet Take 1 tablet (10 mg) by mouth once daily in the evening. 90 tablet 3    nystatin (Mycostatin) 100,000 unit/gram powder Apply topically 2 times a day. 300 g 3    pantoprazole (ProtoNix) 40 mg EC tablet Take 1 tablet (40 mg) by mouth once daily in the morning. Take before meals. 90 tablet 3    potassium chloride CR 10 mEq ER tablet Take 1 tablet (10 mEq) by mouth once daily. 90 tablet 3    pravastatin (Pravachol) 40 mg tablet Take 1 tablet (40 mg) by mouth once daily. 90 tablet 3    spironolactone (Aldactone) 50 mg tablet Take 1 tablet (50 mg) by mouth once daily. 90 tablet 3    terazosin (Hytrin) 10 mg capsule Take 1 capsule (10 mg) by mouth once daily at bedtime. 90 capsule 3    torsemide (Demadex) 100 mg tablet Take 1 tablet (100 mg) by mouth once daily. 90 tablet 3    traZODone (Desyrel) 50 mg tablet TAKE 1 TABLET (50 MG) BY MOUTH AS NEEDED AT BEDTIME FOR SLEEP. 30 tablet 0    umeclidinium-vilanteroL (Anoro Ellipta) 62.5-25 mcg/actuation blister with device Inhale 1 puff once daily. 3 each 3    zolpidem (Ambien) 10 mg tablet Take 0.5 tablets (5 mg) by mouth as needed at bedtime for sleep. 7 tablet 0     No current facility-administered medications on file prior to visit.     DRUG INTERACTIONS  - No significant drug-drug interactions exist that require a change in therapy.   - Eliquis dose needs to be renally adjusted for atrial fibrillation.     Medication Changes:        Assessment/Plan   Problem List Items Addressed This Visit       Type 2 diabetes mellitus with stage 3a chronic kidney disease, without long-term current use of insulin (CMS/[facility])     Other Visit Diagnoses       Acute on chronic congestive heart failure, unspecified heart failure type (CMS/HCC)        Chronic obstructive pulmonary disease, unspecified COPD type (CMS/HCC)                RECOMMENDATIONS/PLAN  1.  PAP: Application  still pending; patient also will qualify for Jardiance 10 mg  - Patient expressed increased copays with Eliquis therapy. Patient verbally reports monthly or yearly income which is less than 400% federal poverty level. Application for program has been submitted for the following medications: Eliquis 2.5 mg BID.  - Patient has been informed that program team will be reaching out to them to discuss necessary documentation, instructed to answer phone/return voicemail.   - Patient aware this process may take up to 6 weeks.   - If approved medication must be filled through [facility] pharmacy and may be picked up or mailed to patient.      2. Medication Changes:  - Patient aware of medication changes: bumetanide 2 mg BID (to replace torsemide), omeprazole (to replace pantoprazole), and Jardiance 10 mg daily (new medication).  - Reviewed side effects of Jardiance therapy/use/administration  - Patient currently not taking potassium, however on home medication list. Reviewed current lab results, and potassium is not needed at this time, however will defer to PCP and cardiologist on re-initiation. Patient is to schedule follow-up appt with PCP and cardiologist, Wale Farrell, once discharged home from facility.    Clinical Pharmacist follow up: 7/5/2023 @ 2:30 pm    Thank you,  Sheryl Mayo, Ayanna     Verbal consent to manage patient's drug therapy was obtained from the patient and an individual authorized to act on behalf of a patient. They were informed they may decline to participate or withdraw from participation in pharmacy services at any time.

## 2023-06-16 NOTE — TELEPHONE ENCOUNTER
Medication has been pended to provider for approval.     LV: 5/3/2023   NV:  none    Last filled 5/30/2023 for 30 days supply

## 2023-06-26 PROBLEM — I50.30 (HFPEF) HEART FAILURE WITH PRESERVED EJECTION FRACTION (MULTI): Status: ACTIVE | Noted: 2023-01-01

## 2023-06-26 NOTE — PROGRESS NOTES
MRN:[id number]     Subjective   Magno Zimmer is a 83 y.o. male who is seen today for reports by therapy team that patient has become dyspneic on exertion over the last 2 days.  The patient endorses SÁNCHEZ and is now requiring 4 L per nasal cannula at rest, though he previously used O2 only while ambulating.  After discussion with nursing, it was determined that the patient had previously been taking Bumex 2 mg p.o. twice daily.  They received an order from cardiology nurse at [facility] last week to increase the dose for 2 days.  Unfortunately, the original 2 mg twice daily order was also discontinued as of 6/23/2023 and patient has been without Bumex.  He has had a 3 pound weight gain in the last 24 hours and 5 pounds over the last 72 hours.  The patient also states he has had feelings of decreased urine output and urinary retention during the same time.    Review of Systems   Respiratory:  Positive for shortness of breath. Negative for cough, chest tightness and wheezing.    Cardiovascular:  Negative for chest pain, palpitations and leg swelling.   Genitourinary:  Positive for decreased urine volume. Negative for frequency and scrotal swelling.   Objective   Lab Results   Component Value Date    GLUCOSE 166 (H) 06/08/2023    CALCIUM 9.0 06/08/2023     (L) 06/08/2023    K 5.0 06/08/2023    CO2 27 06/08/2023     06/08/2023    BUN 79 (H) 06/08/2023    CREATININE 2.11 (H) 06/08/2023      Lab Results   Component Value Date    WBC 13.3 (H) 06/08/2023    HGB 8.3 (L) 06/08/2023    HCT 26.4 (L) 06/08/2023     (H) 06/08/2023     06/08/2023    Physical Exam  Constitutional:       Appearance: He is morbidly obese.   Cardiovascular:      Rate and Rhythm: Normal rate. Rhythm irregularly irregular.      Heart sounds: No murmur heard.     Comments: Wearing BLE compression stockings and legs are elevated.  Pulmonary:      Effort: Pulmonary effort is normal. No respiratory distress.      Breath sounds:  Normal breath sounds.      Comments: Wearing O2 at 4L per NC.  Abdominal:      General: Bowel sounds are normal.      Palpations: Abdomen is soft.   Musculoskeletal:      Right lower leg: No edema.      Left lower leg: No edema.   Skin:     General: Skin is warm and dry.   Neurological:      General: No focal deficit present.      Mental Status: He is alert and oriented to person, place, and time.   Psychiatric:         Mood and Affect: Mood normal.     Assessment    1. Acute on chronic heart failure with preserved ejection fraction (CMS/HCC)      CXR WNL today.  5 pound weight gain in 3 days.  Resume Bumex 2 mg p.o. twice daily.  F/U at cardiology appointment tomorrow.      2. Benign prostatic hyperplasia with incomplete bladder emptying      Nursing to check PVR with bladder scanner now.  If greater than 250 mL urine output, insert Erickson.  Continue finasteride.      3. Chronic atrial fibrillation (CMS/HCC)        4. COPD, severe (CMS/HCC)          We will check a CBC, BMP, and BNP now.  Suspect patient's dyspnea on exertion is due to the recent stoppage of his Bumex, which we will resume now.  Cardiology team was notified by the facility nurse.  Patient is to continue on 4 L nasal cannula to maintain SPO2 93% or above.  Reviewed CXR from this a.m. which showed no acute abnormalities.  The patient is not in acute distress during this exam.  He will follow-up with cardiology tomorrow.

## 2023-06-26 NOTE — LETTER
Patient: Magno Zimmer  : 1940    Encounter Date: 2023    MRN:[id number]     Subjective  Magno Zimmer is a 83 y.o. male who is seen today for reports by therapy team that patient has become dyspneic on exertion over the last 2 days.  The patient endorses SÁNCHEZ and is now requiring 4 L per nasal cannula at rest, though he previously used O2 only while ambulating.  After discussion with nursing, it was determined that the patient had previously been taking Bumex 2 mg p.o. twice daily.  They received an order from cardiology nurse at [facility] last week to increase the dose for 2 days.  Unfortunately, the original 2 mg twice daily order was also discontinued as of 2023 and patient has been without Bumex.  He has had a 3 pound weight gain in the last 24 hours and 5 pounds over the last 72 hours.  The patient also states he has had feelings of decreased urine output and urinary retention during the same time.    Review of Systems   Respiratory:  Positive for shortness of breath. Negative for cough, chest tightness and wheezing.    Cardiovascular:  Negative for chest pain, palpitations and leg swelling.   Genitourinary:  Positive for decreased urine volume. Negative for frequency and scrotal swelling.   Objective  Lab Results   Component Value Date    GLUCOSE 166 (H) 2023    CALCIUM 9.0 2023     (L) 2023    K 5.0 2023    CO2 27 2023     2023    BUN 79 (H) 2023    CREATININE 2.11 (H) 2023      Lab Results   Component Value Date    WBC 13.3 (H) 2023    HGB 8.3 (L) 2023    HCT 26.4 (L) 2023     (H) 2023     2023    Physical Exam  Constitutional:       Appearance: He is morbidly obese.   Cardiovascular:      Rate and Rhythm: Normal rate. Rhythm irregularly irregular.      Heart sounds: No murmur heard.     Comments: Wearing BLE compression stockings and legs are elevated.  Pulmonary:      Effort:  Pulmonary effort is normal. No respiratory distress.      Breath sounds: Normal breath sounds.      Comments: Wearing O2 at 4L per NC.  Abdominal:      General: Bowel sounds are normal.      Palpations: Abdomen is soft.   Musculoskeletal:      Right lower leg: No edema.      Left lower leg: No edema.   Skin:     General: Skin is warm and dry.   Neurological:      General: No focal deficit present.      Mental Status: He is alert and oriented to person, place, and time.   Psychiatric:         Mood and Affect: Mood normal.     Assessment   1. Acute on chronic heart failure with preserved ejection fraction (CMS/HCC)      CXR WNL today.  5 pound weight gain in 3 days.  Resume Bumex 2 mg p.o. twice daily.  F/U at cardiology appointment tomorrow.      2. Benign prostatic hyperplasia with incomplete bladder emptying      Nursing to check PVR with bladder scanner now.  If greater than 250 mL urine output, insert Erickson.  Continue finasteride.      3. Chronic atrial fibrillation (CMS/HCC)        4. COPD, severe (CMS/HCC)          We will check a CBC, BMP, and BNP now.  Suspect patient's dyspnea on exertion is due to the recent stoppage of his Bumex, which we will resume now.  Cardiology team was notified by the facility nurse.  Patient is to continue on 4 L nasal cannula to maintain SPO2 93% or above.  Reviewed CXR from this a.m. which showed no acute abnormalities.  The patient is not in acute distress during this exam.  He will follow-up with cardiology tomorrow.      Electronically Signed By: FATIH Dallas   6/26/23  1:26 PM

## 2023-07-05 NOTE — PROGRESS NOTES
"Pharmacy Post-Discharge Visit  Magno Zimmer is a 83 y.o. male was referred to Clinical Pharmacy Team to complete a post-discharge medication optimization and monitoring visit.  The patient was referred for their diabetes, COPD, and heart failure.    Referring Provider: Jimi Orellana MD    HISTORY OF PRESENT ILLNESS  Spoke with patient today regarding recent hospitalization for pneumonia. Patient states he is doing much better since discharge, and now resides in rehab facility. Reviewed recent medication changes including: nystatin PRN, bumetanide 2 mg BID (to replace torsemide), omeprazole (to replace pantoprazole), and Jardiance 10 mg daily (new medication).    Patient has not been taking bumetanide at rehab facility and has been \"gaining weight.\" Patient has follow-up with cardiologist tomorrow, therefore advised patient to discuss addition of loop diuretic.    Notified patient regarding [employer]  PAP application - still pending.    Subjective   No Known Allergies    [facility] - [place], [place] - [address] [place], [place] [address] [facility] [address]  Phone: [phone] Fax: [phone]    [facility]/pharmacy #[id number] - [place], [place] - [address] [facility] AT [facility] [address]  [address] [facility] [address]  Phone: [phone] Fax: [phone]    [facility] - [place], [place] - [address]  [address]  [facility] [address]  Phone: [phone] Fax: [phone]    Social History     Social History Narrative    Not on file      Diet: Did not discuss    Exercise: Patient is able to to walk ~1000 steps and has to take 1-2 breaks. This is typical for him, and he remains compliant to this regimen.    Objective     There were no vitals taken for this visit.     LAB  Lab Results   Component Value Date    BILITOT 0.4 06/08/2023    CALCIUM 9.0 06/08/2023    CO2 27 06/08/2023     06/08/2023    CREATININE 2.11 (H) 06/08/2023    GLUCOSE 166 (H) 06/08/2023    " ALKPHOS 66 06/08/2023    K 5.0 06/08/2023    PROT 6.2 (L) 06/08/2023     (L) 06/08/2023    AST 10 06/08/2023    ALT 10 06/08/2023    BUN 79 (H) 06/08/2023    ANIONGAP 11 06/08/2023    MG 2.68 (H) 06/08/2023    PHOS 3.3 09/22/2022    ALBUMIN 3.5 06/08/2023    LIPASE 41 01/14/2023     Lab Results   Component Value Date    TRIG 128 03/16/2023    CHOL 118 03/16/2023    HDL 29.5 (A) 03/16/2023     Lab Results   Component Value Date    HGBA1C 7.3 (A) 03/16/2023     Current Outpatient Medications on File Prior to Visit   Medication Sig Dispense Refill    albuterol 90 mcg/actuation inhaler Inhale 2 puffs every 6 hours if needed for wheezing.      allopurinol (Zyloprim) 300 mg tablet Take 1 tablet (300 mg) by mouth once daily. 90 tablet 3    amLODIPine (Norvasc) 5 mg tablet TAKE 1 TABLET BY MOUTH EVERY DAY UPON ARISING 90 tablet 3    apixaban (Eliquis) 2.5 mg tablet Take 1 tablet (2.5 mg) by mouth 2 times a day. 180 tablet 3    B complex-vitamin C-folic acid (Nephrocaps) 1 mg capsule Take 1 capsule by mouth once daily. 90 capsule 3    brimonidine (AlphaGAN P) 0.15 % ophthalmic solution Administer 1 drop into affected eye(s) in the morning and 1 drop in the evening and 1 drop before bedtime.      busPIRone (Buspar) 10 mg tablet Take 1 tablet (10 mg) by mouth in the morning and 1 tablet (10 mg) before bedtime. 180 tablet 3    cyanocobalamin (Vitamin B-12) 1,000 mcg tablet Take 1 tablet (1,000 mcg) by mouth once daily.      finasteride (Proscar) 5 mg tablet Take 1 tablet (5 mg) by mouth once daily. 90 tablet 3    gabapentin (Neurontin) 100 mg capsule Take 1 capsule (100 mg) by mouth once daily at bedtime. 90 capsule 3    HumaLOG KwikPen Insulin 100 unit/mL injection INJECT 5 UNIT 3 times daily with meals 300 mL 3    hydroCHLOROthiazide (HYDRODiuril) 25 mg tablet Take 1 tablet (25 mg) by mouth once daily. 90 tablet 3    ipratropium-albuteroL (Duo-Neb) 0.5-2.5 mg/3 mL nebulizer solution USE 1 VIAL VIA NEBULIZER EVERY 8  HOURS Strength: 0.5-2.5 mg/3 mL 180 mL 3    latanoprost (Xalatan) 0.005 % ophthalmic solution Administer 1 drop into affected eye(s) once daily.      metoprolol tartrate (Lopressor) 25 mg tablet take half tablet twice a day 90 tablet 3    montelukast (Singulair) 10 mg tablet Take 1 tablet (10 mg) by mouth once daily in the evening. 90 tablet 3    nystatin (Mycostatin) 100,000 unit/gram powder Apply topically 2 times a day. 300 g 3    pantoprazole (ProtoNix) 40 mg EC tablet Take 1 tablet (40 mg) by mouth once daily in the morning. Take before meals. 90 tablet 3    potassium chloride CR 10 mEq ER tablet Take 1 tablet (10 mEq) by mouth once daily. 90 tablet 3    pravastatin (Pravachol) 40 mg tablet Take 1 tablet (40 mg) by mouth once daily. 90 tablet 3    spironolactone (Aldactone) 50 mg tablet Take 1 tablet (50 mg) by mouth once daily. 90 tablet 3    terazosin (Hytrin) 10 mg capsule Take 1 capsule (10 mg) by mouth once daily at bedtime. 90 capsule 3    torsemide (Demadex) 100 mg tablet Take 1 tablet (100 mg) by mouth once daily. 90 tablet 3    traZODone (Desyrel) 50 mg tablet TAKE 1 TABLET (50 MG) BY MOUTH AS NEEDED AT BEDTIME FOR SLEEP. 30 tablet 0    umeclidinium-vilanteroL (Anoro Ellipta) 62.5-25 mcg/actuation blister with device Inhale 1 puff once daily. 3 each 3    zolpidem (Ambien) 10 mg tablet Take 0.5 tablets (5 mg) by mouth as needed at bedtime for sleep. 7 tablet 0     No current facility-administered medications on file prior to visit.     DRUG INTERACTIONS  - No significant drug-drug interactions exist that require a change in therapy.     Medication Changes:        Assessment/Plan   Problem List Items Addressed This Visit       Type 2 diabetes mellitus with stage 3a chronic kidney disease, without long-term current use of insulin (CMS/[facility])     Other Visit Diagnoses       Acute on chronic congestive heart failure, unspecified heart failure type (CMS/[facility])        Chronic obstructive pulmonary disease,  unspecified COPD type (CMS/[facility])                RECOMMENDATIONS/PLAN  1.  PAP: Application still pending; patient also will qualify for Jardiance 10 mg  - Patient expressed increased copays with Eliquis therapy. Patient verbally reports monthly or yearly income which is less than 400% federal poverty level. Application for program has been submitted for the following medications: Eliquis 2.5 mg BID.  - Patient has been informed that program team will be reaching out to them to discuss necessary documentation, instructed to answer phone/return voicemail.   - Patient aware this process may take up to 6 weeks.   - If approved medication must be filled through [facility] pharmacy and may be picked up or mailed to patient.      2. Medication Changes:  - Patient aware of medication changes: bumetanide 2 mg BID (to replace torsemide), omeprazole (to replace pantoprazole), and Jardiance 10 mg daily (new medication).  - Reviewed side effects of Jardiance therapy/use/administration    Clinical Pharmacist follow up: 7/19/2023 @ 2:00 pm - [phone]    Thank you,  Sheryl Mayo, PharmD     Verbal consent to manage patient's drug therapy was obtained from the patient and an individual authorized to act on behalf of a patient. They were informed they may decline to participate or withdraw from participation in pharmacy services at any time.

## 2023-07-14 NOTE — PROGRESS NOTES
Discharge Facility: [facility]  Discharge Diagnosis: Acute respiratory failure, hypoxia, pneumonia, CHF exacerbation, COPD, RAKESH on CKD, hyponatremia  Admission Date: 6/8/2023  Discharge Date:  7/12/2023    PCP Appointment Date:   Specialist Appointment Date:   Hospital Encounter and Summary: Linked   See discharge assessment below for further details  Engagement  Call Start Time: 1110 (7/14/2023 11:21 AM)    Medications  Medications reviewed with patient/caregiver?: Yes (7/14/2023 11:21 AM)  Is the patient having any side effects they believe may be caused by any medication additions or changes?: No (7/14/2023 11:21 AM)  Does the patient have all medications ordered at discharge?: Yes (7/14/2023 11:21 AM)  Care Management Interventions: Provided patient education (7/14/2023 11:21 AM)  Prescription Comments: see med list (7/14/2023 11:21 AM)    Appointments  Does the patient have a primary care provider?: Yes (7/14/2023 11:21 AM)  Care Management Interventions: Educated patient on importance of making appointment; Advised patient to make appointment (office tasked to arrange follow up appt with PCP as needed) (7/14/2023 11:21 AM)  Care Management Interventions: Advised to schedule with specialist (7/14/2023 11:21 AM)    Self Management  What is the home health agency?: denies (7/14/2023 11:21 AM)  What Durable Medical Equipment (DME) was ordered?: home oxygen 2-3LNC (7/14/2023 11:21 AM)  Has all Durable Medical Equipment (DME) been delivered?: Yes (7/14/2023 11:21 AM)    Patient Teaching  Does the patient have access to their discharge instructions?: Yes (7/14/2023 11:21 AM)  Care Management Interventions: Reviewed instructions with patient (7/14/2023 11:21 AM)  What is the patient's perception of their health status since discharge?: Improving (7/14/2023 11:21 AM)  Is the patient/caregiver able to teach back the hierarchy of who to call/visit for symptoms/problems? PCP, Specialist, Home Health nurse,  Urgent Care, ED, 911: Yes (7/14/2023 11:21 AM)  Patient/Caregiver Education Comments: Patient states he does still have swelling in legs but no SOB at time of outreach call if he leaves his oxygen on. If he takes his O2 off he quickly becomes SOB. Patient states he hasn't weighed self since coming home. Educated on importance of keeping track of weight and to call cardiologist/PCP if gain 3lbs in a day or 5lbs in a week. Patient also states he stopped taking Jardiance due to side effects and was asking if there was a different medication he could try as BS has not been below 200 since coming home. (7/14/2023 11:21 AM)

## 2023-07-19 NOTE — PROGRESS NOTES
Pharmacy Post-Discharge Visit  Magno Zimmer is a 83 y.o. male was referred to Clinical Pharmacy Team to complete a post-discharge medication optimization and monitoring visit.  The patient was referred for their diabetes, COPD, and heart failure.    Referring Provider: Jimi Orellana MD    HISTORY OF PRESENT ILLNESS  Spoke with patient and patient's family members (Heike and Kacie) today. Reviewed recent medication changes since discharge from [facility] including: bumetanide 2 mg BID (to replace torsemide), and Jardiance 10 mg daily (new medication).    Patient states he experienced side effects with Jardiance (did not elaborate), and was not prescribed after discharge from [facility], so has not been taking. Advised medication will not only assist with diabetes and may be able to eventually discontinue insulin, however also poses significant kidney and heart benefits.    Subjective   No Known Allergies    [facility] - [place], [place] - [address] [facility]  [address] [facility] [address]  Phone: [phone] Fax: [phone]    [facility]/pharmacy #[id number] - [place], [place] - [address] [facility] AT CORNER [address]  [address] [facility] [address]  Phone: [phone] Fax: [phone]    [facility] - [place], [place] - [address]  [address]  [facility] [address]  Phone: [phone] Fax: [phone]    Social History     Social History Narrative    Not on file      Diet: Did not discuss    Exercise: Patient is able to to walk ~1000 steps and has to take 1-2 breaks. This is typical for him, and he remains compliant to this regimen.    Objective     There were no vitals taken for this visit.     LAB  Lab Results   Component Value Date    BILITOT 0.4 06/08/2023    CALCIUM 9.3 07/12/2023    CO2 29 07/12/2023     07/12/2023    CREATININE 1.76 (H) 07/12/2023    GLUCOSE 139 (H) 07/12/2023    ALKPHOS 66 06/08/2023    K 3.9  07/12/2023    PROT 6.2 (L) 06/08/2023     07/12/2023    AST 10 06/08/2023    ALT 10 06/08/2023    BUN 43 (H) 07/12/2023    ANIONGAP 11 07/12/2023    MG 2.68 (H) 06/08/2023    PHOS 3.7 07/12/2023    ALBUMIN 3.5 06/08/2023    LIPASE 41 01/14/2023     Lab Results   Component Value Date    TRIG 128 03/16/2023    CHOL 118 03/16/2023    HDL 29.5 (A) 03/16/2023     Lab Results   Component Value Date    HGBA1C 7.3 (A) 03/16/2023     Current Outpatient Medications on File Prior to Visit   Medication Sig Dispense Refill    acetaminophen (Tylenol) 500 mg tablet Take 2 tablets (1,000 mg) by mouth 3 times a day.      albuterol 90 mcg/actuation inhaler Inhale 2 puffs every 6 hours if needed for wheezing.      allopurinol (Zyloprim) 300 mg tablet Take 1 tablet (300 mg) by mouth once daily. 90 tablet 3    amLODIPine (Norvasc) 5 mg tablet TAKE 1 TABLET BY MOUTH EVERY DAY UPON ARISING 90 tablet 3    apixaban (Eliquis) 2.5 mg tablet Take 1 tablet (2.5 mg) by mouth 2 times a day. 180 tablet 3    B complex-vitamin C-folic acid (Nephrocaps) 1 mg capsule Take 1 capsule by mouth once daily. 90 capsule 3    brimonidine (AlphaGAN P) 0.15 % ophthalmic solution Administer 1 drop into affected eye(s) in the morning and 1 drop in the evening and 1 drop before bedtime.      bumetanide (Bumex) 1 mg tablet Take by mouth.      busPIRone (Buspar) 10 mg tablet Take 1 tablet (10 mg) by mouth in the morning and 1 tablet (10 mg) before bedtime. 180 tablet 3    cyanocobalamin (Vitamin B-12) 1,000 mcg tablet Take 1 tablet (1,000 mcg) by mouth once daily.      empagliflozin (Jardiance) 10 mg Take 1 tablet (10 mg) by mouth once daily.      finasteride (Proscar) 5 mg tablet Take 1 tablet (5 mg) by mouth once daily. 90 tablet 3    gabapentin (Neurontin) 100 mg capsule Take 1 capsule (100 mg) by mouth once daily at bedtime. 90 capsule 3    HumaLOG KwikPen Insulin 100 unit/mL injection INJECT 5 UNIT 3 times daily with meals 300 mL 3    hydroCHLOROthiazide  (HYDRODiuril) 25 mg tablet Take 1 tablet (25 mg) by mouth once daily. 90 tablet 3    ipratropium-albuteroL (Duo-Neb) 0.5-2.5 mg/3 mL nebulizer solution USE 1 VIAL VIA NEBULIZER EVERY 8 HOURS Strength: 0.5-2.5 mg/3 mL 180 mL 3    latanoprost (Xalatan) 0.005 % ophthalmic solution Administer 1 drop into affected eye(s) once daily.      metoprolol tartrate (Lopressor) 25 mg tablet take half tablet twice a day 90 tablet 3    montelukast (Singulair) 10 mg tablet Take 1 tablet (10 mg) by mouth once daily in the evening. 90 tablet 3    nystatin (Mycostatin) 100,000 unit/gram powder Apply topically 2 times a day. 300 g 3    pantoprazole (ProtoNix) 40 mg EC tablet Take 1 tablet (40 mg) by mouth once daily in the morning. Take before meals. 90 tablet 3    potassium chloride CR 10 mEq ER tablet Take 1 tablet (10 mEq) by mouth once daily. 90 tablet 3    pravastatin (Pravachol) 40 mg tablet Take 1 tablet (40 mg) by mouth once daily. 90 tablet 3    spironolactone (Aldactone) 50 mg tablet Take 1 tablet (50 mg) by mouth once daily. 90 tablet 3    terazosin (Hytrin) 10 mg capsule Take 1 capsule (10 mg) by mouth once daily at bedtime. 90 capsule 3    torsemide (Demadex) 100 mg tablet Take 1 tablet (100 mg) by mouth once daily. 90 tablet 3    traZODone (Desyrel) 50 mg tablet TAKE 1 TABLET (50 MG) BY MOUTH AS NEEDED AT BEDTIME FOR SLEEP. 30 tablet 0    umeclidinium-vilanteroL (Anoro Ellipta) 62.5-25 mcg/actuation blister with device Inhale 1 puff once daily. 3 each 3    zolpidem (Ambien) 10 mg tablet Take 0.5 tablets (5 mg) by mouth as needed at bedtime for sleep. 7 tablet 0     No current facility-administered medications on file prior to visit.     DRUG INTERACTIONS  - No significant drug-drug interactions exist that require a change in therapy.     Medication Changes:          Assessment/Plan   Problem List Items Addressed This Visit       Type 2 diabetes mellitus with stage 3a chronic kidney disease, without long-term current use of  "insulin (CMS/[facility])     Other Visit Diagnoses       Acute on chronic congestive heart failure, unspecified heart failure type (CMS/[facility])        Chronic obstructive pulmonary disease, unspecified COPD type (CMS/[facility])                RECOMMENDATIONS/PLAN  1.  PAP: Application approved  - Advised patient will need to reapply next year to continue to receive benefits from  PAP; however will continue to receive Eliquis 2.5 mg BID for $0 copay through [facility] pharmacy until June 2023.  2. Medication Changes:  -Patient has not been taking bumetanide and has been \"gaining weight.\" Patient has follow-ups scheduled with cardiologist and nephrologist in the next two weeks, therefore advised patient to discuss Jardiance and bumetanide therapy with specialists.    Clinical Pharmacist follow up: 8/14/2023 @ 2:30 pm @ [phone] (Kacie - patient's daughter)/[phone] (Heike Thornton)    Thank you,  Sheryl Mayo, PharmD     Verbal consent to manage patient's drug therapy was obtained from the patient and an individual authorized to act on behalf of a patient. They were informed they may decline to participate or withdraw from participation in pharmacy services at any time.  "

## 2023-07-24 NOTE — TELEPHONE ENCOUNTER
Recent Visits  Date Type Provider Dept   05/03/23 Office Visit Jimi Orellana MD Do [id number]   04/04/23 Office Visit [id number]   Showing recent visits within past 540 days and meeting all other requirements  Future Appointments  No visits were found meeting these conditions.  Showing future appointments within next 180 days and meeting all other requirements

## 2023-07-27 NOTE — PROGRESS NOTES
"Call regarding after hospitalization/SNF DC.  At time of outreach call the patient feels as if their condition has returned to baseline. Asked to have appt made with PCP for FUV and \"a little problem I want to discuss with him\". Did not want to go into detail at this time about condition. Appt made per patient's preferences. No other questions or concerns noted at this time.  "

## 2023-08-01 NOTE — PROGRESS NOTES
"Subjective   Patient ID: Magno Zimmer is a 83 y.o. male who presents for Follow-up, Diabetes, and Obesity.  Here with daughter Romina.  \  Diabetes Mellitus: The patient presents today for a follow up of DM. Patient denies any side effects to the medications. Blood sugars 150-250.    Chronic Renal Impairment, Stage 4: This patient has stage IV renal insufficiency which has been stable. Patient is encouraged to continue water intake. Patient is encouraged to remain very well hydrated.      Morbid Obesity: The patient is here for follow up of morbid obesity.  The patient is continuously working on diet and exercise. The patient will continue working on strategies to maintain weight loss and stay well hydrated.     COPD: The patient presents for a follow up of COPD. Patient is currently on oxygen. He recently was in hospital with pneumonia.     Bronchitis: Patient presents for a follow up of bronchitis. Patient is coughing up thick yellow phlegm. Pt was admitted to [facility] and then [facility].    Pt has a phimosis and may need intervention, he has an appt with \"kidney\" doctor soon, if a nephrologist he will need urology referral.    The patient denies having the following symptoms: chest pain, chest pressure, fever, chills, N/V/D, constipation, dizziness, headaches, SOB.          Objective   Vitals:  /50   Pulse 91   Temp 35.8 °C (96.4 °F)   Resp 16   Ht 1.753 m (5' 9\")   Wt 126 kg (277 lb)   SpO2 92%   BMI 40.91 kg/m²     Physical Exam  Vitals reviewed.   Constitutional:       Appearance: Normal appearance. He is obese.      Comments: Patient wearing oxygen at 2L in office and daily.    Neck:      Vascular: No carotid bruit.   Cardiovascular:      Rate and Rhythm: Normal rate and regular rhythm.      Pulses: Normal pulses.      Heart sounds: Normal heart sounds.   Pulmonary:      Effort: Pulmonary effort is normal. No respiratory distress.      Breath sounds: Normal breath sounds. No " wheezing.   Abdominal:      General: There is no distension.      Palpations: Abdomen is soft. There is no mass.      Tenderness: There is no abdominal tenderness. There is no right CVA tenderness, left CVA tenderness, guarding or rebound.   Musculoskeletal:      Cervical back: Normal range of motion and neck supple. No rigidity.      Right lower leg: No edema.      Left lower leg: No edema.      Comments: Edema is much less with support of stockings.    Lymphadenopathy:      Cervical: No cervical adenopathy.   Neurological:      Mental Status: He is alert.            Labs reviewed from :  03/16/2023  CMP, CBC, Lipid, HgA1C 7.3 % WNL.       Assessment/Plan   Problem List Items Addressed This Visit       COPD, severe (CMS/HCC)      Is well controlled, continue with current medications. Pt on Oxygen.         Primary insomnia      Is well controlled, continue with current medications.          Relevant Medications    zolpidem (Ambien) 10 mg tablet    zolpidem (Ambien) 10 mg tablet    Type 2 diabetes mellitus with stage 4 chronic kidney disease, without long-term current use of insulin (CMS/HCC) - Primary     Is clinically stable so we will continue with current medications and lab work to confirm status ordered.          Relevant Orders    Follow Up In [facility] - PCP - Established    Morbid obesity (CMS/HCC)      This condition is poorly controlled, therapeutic changes necessary.   The patient is continuously working on diet and exercise. The patient will continue working on strategies to maintain weight loss and stay well hydrated.            Stage 4 chronic kidney disease (CMS/HCC)     Is clinically stable so we will continue with current medications and lab work to confirm status ordered.          RESOLVED: Bronchitis      This condition is poorly controlled, therapeutic changes necessary.          Relevant Medications    azithromycin (Zithromax) 250 mg tablet           Follow up in: 2 months or sooner  if needed with labs prior.     Scribe Attestation  By signing my name below, I, Jade Harrison   attest that this documentation has been prepared under the direction and in the presence of Jimi Orellana MD.

## 2023-08-03 PROBLEM — N18.4 TYPE 2 DIABETES MELLITUS WITH STAGE 4 CHRONIC KIDNEY DISEASE, WITHOUT LONG-TERM CURRENT USE OF INSULIN (MULTI): Status: ACTIVE | Noted: 2023-01-01

## 2023-08-03 PROBLEM — J40 BRONCHITIS: Status: ACTIVE | Noted: 2023-01-01

## 2023-08-03 PROBLEM — J40 BRONCHITIS: Status: RESOLVED | Noted: 2023-01-01 | Resolved: 2023-01-01

## 2023-08-08 NOTE — TELEPHONE ENCOUNTER
Daughter needs a script for nebulizer cups for the medical supply compan [facility]. Its a one year supply kit with two refills  fax number. Machine is a [employer] model.    [phone]

## 2023-08-14 NOTE — PROGRESS NOTES
Pharmacy Post-Discharge Visit  Magno Zimmer is a 83 y.o. male was referred to Clinical Pharmacy Team to complete a post-discharge medication optimization and monitoring visit. The patient was referred for their diabetes, COPD, and heart failure.    Referring Provider: Jimi Orellana MD    HISTORY OF PRESENT ILLNESS  Spoke with patient and patient's daughter, Kacie mancini. Reviewed recent medication changes - metoprolol tartrate (discontinued by cardiologist). Patient denies any concerns at this time, however would like cost assistance with Jardiance. Patient also interested in CGM.    Subjective   Allergies   Allergen Reactions    Heparin Unknown       [facility] - [place], [place] - [address] [facility]  [address] [facility] [address]  Phone: [phone] Fax: [phone]    [facility]/pharmacy #[id number] - [place], [place] - [address] [facility] [address]  [address] [facility] [place] [address]  Phone: [phone] Fax: [phone]     [facility] - [place], [place] - [address] [facility] [address]  [address] [facility] [address]  [facility] [address]  Phone: [phone] Fax: [phone]    Social History     Social History Narrative    Not on file      Diet: Did not discuss    Exercise: Patient is able to to walk ~1000 steps and has to take 1-2 breaks. This is typical for him, and he remains compliant to this regimen.    Objective     There were no vitals taken for this visit.     LAB  Lab Results   Component Value Date    BILITOT 0.4 06/08/2023    CALCIUM 9.3 07/12/2023    CO2 29 07/12/2023     07/12/2023    CREATININE 1.76 (H) 07/12/2023    GLUCOSE 139 (H) 07/12/2023    ALKPHOS 66 06/08/2023    K 3.9 07/12/2023    PROT 6.2 (L) 06/08/2023     07/12/2023    AST 10 06/08/2023    ALT 10 06/08/2023    BUN 43 (H) 07/12/2023    ANIONGAP 11 07/12/2023    MG 2.68 (H) 06/08/2023    PHOS 3.7 07/12/2023    ALBUMIN 3.5 06/08/2023    LIPASE 41 01/14/2023     Lab Results   Component Value Date     TRIG 128 03/16/2023    CHOL 118 03/16/2023    HDL 29.5 (A) 03/16/2023     Lab Results   Component Value Date    HGBA1C 7.3 (A) 03/16/2023     Current Outpatient Medications on File Prior to Visit   Medication Sig Dispense Refill    acetaminophen (Tylenol) 500 mg tablet Take 2 tablets (1,000 mg) by mouth 3 times a day.      albuterol 90 mcg/actuation inhaler Inhale 2 puffs every 6 hours if needed for wheezing.      allopurinol (Zyloprim) 300 mg tablet Take 1 tablet (300 mg) by mouth once daily. 90 tablet 3    amLODIPine (Norvasc) 5 mg tablet TAKE 1 TABLET BY MOUTH EVERY DAY UPON ARISING 90 tablet 3    apixaban (Eliquis) 2.5 mg tablet Take 1 tablet (2.5 mg) by mouth 2 times a day. 180 tablet 3    azithromycin (Zithromax) 250 mg tablet 2 tablets today, then 1 tablet for four days 6 tablet 0    B complex-vitamin C-folic acid (Nephrocaps) 1 mg capsule Take 1 capsule by mouth once daily. 90 capsule 3    brimonidine (AlphaGAN P) 0.15 % ophthalmic solution Administer 1 drop into affected eye(s) 3 times a day.      bumetanide (Bumex) 2 mg tablet Take 1 tablet (2 mg) by mouth once daily. 90 tablet 1    busPIRone (Buspar) 10 mg tablet Take 1 tablet (10 mg) by mouth in the morning and 1 tablet (10 mg) before bedtime. 180 tablet 3    cyanocobalamin (Vitamin B-12) 1,000 mcg tablet Take 1 tablet (1,000 mcg) by mouth once daily.      empagliflozin (Jardiance) 10 mg Take 1 tablet (10 mg) by mouth once daily.      finasteride (Proscar) 5 mg tablet Take 1 tablet (5 mg) by mouth once daily. 90 tablet 3    gabapentin (Neurontin) 100 mg capsule Take 1 capsule (100 mg) by mouth once daily at bedtime. 90 capsule 3    HumaLOG KwikPen Insulin 100 unit/mL injection INJECT 5 UNIT 3 times daily with meals 300 mL 3    hydroCHLOROthiazide (HYDRODiuril) 25 mg tablet Take 1 tablet (25 mg) by mouth once daily. 90 tablet 3    ipratropium-albuteroL (Duo-Neb) 0.5-2.5 mg/3 mL nebulizer solution USE 1 VIAL VIA NEBULIZER EVERY 8 HOURS Strength: 0.5-2.5  mg/3 mL 180 mL 3    latanoprost (Xalatan) 0.005 % ophthalmic solution Administer 1 drop into affected eye(s) once daily.      montelukast (Singulair) 10 mg tablet Take 1 tablet (10 mg) by mouth once daily in the evening. 90 tablet 3    nystatin (Mycostatin) 100,000 unit/gram powder Apply topically 2 times a day. 300 g 3    pantoprazole (ProtoNix) 40 mg EC tablet Take 1 tablet (40 mg) by mouth once daily in the morning. Take before meals. 90 tablet 3    potassium chloride CR 10 mEq ER tablet Take 1 tablet (10 mEq) by mouth once daily. 90 tablet 3    pravastatin (Pravachol) 40 mg tablet Take 1 tablet (40 mg) by mouth once daily. 90 tablet 3    spironolactone (Aldactone) 50 mg tablet Take 1 tablet (50 mg) by mouth once daily. 90 tablet 3    terazosin (Hytrin) 10 mg capsule Take 1 capsule (10 mg) by mouth once daily at bedtime. 90 capsule 3    torsemide (Demadex) 100 mg tablet Take 1 tablet (100 mg) by mouth once daily. 90 tablet 3    traZODone (Desyrel) 50 mg tablet Take 1 tablet (50 mg) by mouth as needed at bedtime for sleep. 90 tablet 0    umeclidinium-vilanteroL (Anoro Ellipta) 62.5-25 mcg/actuation blister with device Inhale 1 puff once daily. 3 each 3    zolpidem (Ambien) 10 mg tablet Take 1 tablet (10 mg) by mouth as needed at bedtime for sleep. 30 tablet 0    zolpidem (Ambien) 10 mg tablet Take 1 tablet (10 mg) by mouth as needed at bedtime for sleep. 90 tablet 0     No current facility-administered medications on file prior to visit.     DRUG INTERACTIONS  - No significant drug-drug interactions exist that require a change in therapy.     Assessment/Plan   Problem List Items Addressed This Visit    None  Visit Diagnoses       Acute on chronic congestive heart failure, unspecified heart failure type (CMS/HCC)        Chronic obstructive pulmonary disease, unspecified COPD type (CMS/[facility])        Type 2 diabetes mellitus with stage 3a chronic kidney disease, without long-term current use of insulin (CMS/[facility])               RECOMMENDATIONS/PLAN  1.  PAP: Application approved  - Advised patient will need to reapply next year to continue to receive benefits from  PAP; however will continue to receive Eliquis 2.5 mg BID for $0 copay through [facility] until June 2023.  - Sent prescription for Jardiance 10 mg daily to  [facility] to be added to patient's PAP benefit.  - Sent Freestyle Lynda II prescription to [facility]. Patient injecting insulin three times per day, therefore likely will be approved through insurance.    2. Medication Changes:  - Metoprolol tartrate was discontinued by cardiologist. Patient denies any concerns at this time.    Clinical Pharmacist follow up: 8/28/2023 @ 2:30 pm @ [phone] (Kacie - patient's daughter)    Thank you,  Sheryl Mayo, PharmD     Verbal consent to manage patient's drug therapy was obtained from the patient and an individual authorized to act on behalf of a patient. They were informed they may decline to participate or withdraw from participation in pharmacy services at any time.

## 2023-08-17 NOTE — TELEPHONE ENCOUNTER
Recent Visits  Date Type Provider Dept   08/03/23 Office Visit Jimi Orellana MD Do [id number] [facility]   05/03/23 Office Visit Jimi Orellana MD Do [id number] [facility]   04/04/23 Office Visit Jimi Orellana MD Do [id number] [facility]   Showing recent visits within past 540 days and meeting all other requirements  Future Appointments  Date Type Provider Dept   10/03/23 Appointment Jimi Orellana MD Do [id number] [facility]   Showing future appointments within next 180 days and meeting all other requirements

## 2023-08-28 NOTE — PROGRESS NOTES
Pharmacy Post-Discharge Visit  Magno Zimmer is a 83 y.o. male was referred to Clinical Pharmacy Team to complete a post-discharge medication optimization and monitoring visit. The patient was referred for their diabetes, COPD, and heart failure.    Referring Provider: Jimi Orellana MD    HISTORY OF PRESENT ILLNESS  Spoke with patient and patient's daughter, Kacie mancini. Patient reports the following concerns:    1) Diabetes - patient reporting hyperglycemia. Fasting readings: 177-185 mg/dL and lunch readings >250 mg/dL. Patient reports compliance with Humalog 5 units TID with meals.  2) Insomnia - patient's daughter concerned with trazodone, buspirone, and Ambien taken at night together, however patient still not sleeping. Reports taking trazodone, gets in bed by 10-11 pm, then taking Ambien. Patient falls asleep, then wakes up by 3 am and using restroom at least 3 times/night  3) Gabapentin - patient's daughter states he was taking for Shingles, and unsure if he still needs to take  4) Concurrent torsemide/bumetanide therapy - patient taking both medications; does not have follow-up appts with cardiology/urology/nephrology until Dec 2023/Jan. 2024  5) Freestyle CGM - awaiting response from [facility]    Subjective   Allergies   Allergen Reactions    Heparin Unknown       [facility] - [place], [place] - [address] [place], [place] [address] [facility] [address]  Phone: [phone] Fax: [phone]    [facility]/pharmacy #[id number] - [place], [place] - [address] [facility] [address] [facility] [address]  [address] [facility] [address]  Phone: [phone] Fax: [phone]    [facility] - [place], [place] - [address] [facility] [address]  [address]  [facility] [address]  Phone: [phone] Fax: [phone]    [facility] [place], [place] - [address] [facility]  [address] [facility] [address]  Phone: [phone] Fax: [phone]    [facility] - [place], [place]  - [address] [facility]  [address] [facility]  [place] [address]  Phone: [phone] Fax: [phone]    Social History     Social History Narrative    Not on file      Objective     There were no vitals taken for this visit.     LAB  Lab Results   Component Value Date    BILITOT 0.4 06/08/2023    CALCIUM 9.3 07/12/2023    CO2 29 07/12/2023     07/12/2023    CREATININE 1.76 (H) 07/12/2023    GLUCOSE 139 (H) 07/12/2023    ALKPHOS 66 06/08/2023    K 3.9 07/12/2023    PROT 6.2 (L) 06/08/2023     07/12/2023    AST 10 06/08/2023    ALT 10 06/08/2023    BUN 43 (H) 07/12/2023    ANIONGAP 11 07/12/2023    MG 2.68 (H) 06/08/2023    PHOS 3.7 07/12/2023    ALBUMIN 3.5 06/08/2023    LIPASE 41 01/14/2023     Lab Results   Component Value Date    TRIG 128 03/16/2023    CHOL 118 03/16/2023    HDL 29.5 (A) 03/16/2023     Lab Results   Component Value Date    HGBA1C 7.3 (A) 03/16/2023     Current Outpatient Medications on File Prior to Visit   Medication Sig Dispense Refill    acetaminophen (Tylenol) 500 mg tablet Take 2 tablets (1,000 mg) by mouth 3 times a day.      albuterol 90 mcg/actuation inhaler Inhale 2 puffs every 6 hours if needed for wheezing.      allopurinol (Zyloprim) 300 mg tablet Take 1 tablet (300 mg) by mouth once daily. 90 tablet 3    amLODIPine (Norvasc) 5 mg tablet TAKE 1 TABLET BY MOUTH EVERY DAY UPON ARISING 90 tablet 3    apixaban (Eliquis) 2.5 mg tablet Take 1 tablet (2.5 mg) by mouth 2 times a day. 180 tablet 3    azithromycin (Zithromax) 250 mg tablet 2 tablets today, then 1 tablet for four days 6 tablet 0    B complex-vitamin C-folic acid (Nephrocaps) 1 mg capsule Take 1 capsule by mouth once daily. 90 capsule 3    brimonidine (AlphaGAN P) 0.15 % ophthalmic solution Administer 1 drop into affected eye(s) 3 times a day.      bumetanide (Bumex) 2 mg tablet Take 1 tablet (2 mg) by mouth once daily. 90 tablet 1    busPIRone (Buspar) 10 mg tablet Take 1 tablet (10 mg) by mouth in the morning and 1 tablet (10  mg) before bedtime. 180 tablet 3    cyanocobalamin (Vitamin B-12) 1,000 mcg tablet Take 1 tablet (1,000 mcg) by mouth once daily.      empagliflozin (Jardiance) 10 mg Take 1 tablet (10 mg) by mouth once daily. 30 tablet 3    finasteride (Proscar) 5 mg tablet Take 1 tablet (5 mg) by mouth once daily. 90 tablet 3    FreeStyle Lynda reader (FreeStyle Lynda 2 [place]) misc Use as instructed 1 each 0    FreeStyle Lynda sensor system (FreeStyle Lynda 2 Sensor) kit Use as instructed to test blood sugar three times daily and as needed 2 each 5    gabapentin (Neurontin) 100 mg capsule Take 1 capsule (100 mg) by mouth once daily at bedtime. 90 capsule 3    hydroCHLOROthiazide (HYDRODiuril) 25 mg tablet Take 1 tablet (25 mg) by mouth once daily. 90 tablet 3    insulin lispro (HumaLOG) 100 unit/mL injection INJECT 5 UNIT 3 times daily with meals 15 mL 2    ipratropium-albuteroL (Duo-Neb) 0.5-2.5 mg/3 mL nebulizer solution USE 1 VIAL VIA NEBULIZER EVERY 8 HOURS Strength: 0.5-2.5 mg/3 mL 180 mL 3    latanoprost (Xalatan) 0.005 % ophthalmic solution Administer 1 drop into affected eye(s) once daily.      montelukast (Singulair) 10 mg tablet Take 1 tablet (10 mg) by mouth once daily in the evening. 90 tablet 3    nebulizer accessories kit Product SKU: [id number] 3 kit 2    nystatin (Mycostatin) 100,000 unit/gram powder Apply topically 2 times a day. 300 g 3    pantoprazole (ProtoNix) 40 mg EC tablet Take 1 tablet (40 mg) by mouth once daily in the morning. Take before meals. 90 tablet 3    potassium chloride CR 10 mEq ER tablet Take 1 tablet (10 mEq) by mouth once daily. 90 tablet 3    pravastatin (Pravachol) 40 mg tablet Take 1 tablet (40 mg) by mouth once daily. 90 tablet 3    spironolactone (Aldactone) 50 mg tablet Take 1 tablet (50 mg) by mouth once daily. 90 tablet 3    terazosin (Hytrin) 10 mg capsule Take 1 capsule (10 mg) by mouth once daily at bedtime. 90 capsule 3    torsemide (Demadex) 100 mg tablet Take 1  tablet (100 mg) by mouth once daily. 90 tablet 3    traZODone (Desyrel) 50 mg tablet Take 1 tablet (50 mg) by mouth as needed at bedtime for sleep. 90 tablet 0    umeclidinium-vilanteroL (Anoro Ellipta) 62.5-25 mcg/actuation blister with device Inhale 1 puff once daily. 3 each 3    zolpidem (Ambien) 10 mg tablet Take 1 tablet (10 mg) by mouth as needed at bedtime for sleep. 30 tablet 0    zolpidem (Ambien) 10 mg tablet Take 1 tablet (10 mg) by mouth as needed at bedtime for sleep. 90 tablet 0     No current facility-administered medications on file prior to visit.     Assessment/Plan   Problem List Items Addressed This Visit    None  Visit Diagnoses       Acute on chronic congestive heart failure, unspecified heart failure type (CMS/[facility])        Chronic obstructive pulmonary disease, unspecified COPD type (CMS/[facility])        Type 2 diabetes mellitus with stage 3a chronic kidney disease, without long-term current use of insulin (CMS/[facility])              RECOMMENDATIONS/PLAN  1. Diabetes   - INCREASE: Humalog 8 units TID with meals.   - Continue: Jardiance 10 mg daily  2. Insomnia - RPH to reach out to provider - can consider increasing trazodone  3. Gabapentin - [facility] to reach out to provider - recommending to discontinue  4. Concurrent torsemide/bumetanide dose - [facility] to reach out to provider for discontinuation of one agent  5. Freestyle CGM - awaiting response from [facility] to contact [facility] again    Clinical Pharmacist follow up: 9/5/2023 @ 2:30 pm @ [phone] (Kacie - patient's daughter)    Thank you,  Sheryl Mayo, PharmD     Verbal consent to manage patient's drug therapy was obtained from the patient and an individual authorized to act on behalf of a patient. They were informed they may decline to participate or withdraw from participation in pharmacy services at any time.

## 2023-08-28 NOTE — Clinical Note
Luigi Orellana - Patient reports insomnia, would you consider increasing trazodone to 100 mg at bedtime? Also, patient has been taking gabapentin since his shingles outbreak; may he discontinue therapy? Finally, patient is taking both torsemide and bumetanide - do you have a preference which medication he should discontinue? Thank you!

## 2023-08-30 NOTE — TELEPHONE ENCOUNTER
"Spoke with patient's daughter, Kacie today regarding medication changes.   Advised the following, per Dr. Orellana's response:    - Continue Humalog 8 units TID with meals, and Jardiance 10 mg daily  - Increase trazodone to 100 mg at bedtime (take two 50 mg tablets)  - Decrease gabapentin to 100 mg every other day for 2 weeks, then discontinue  - Stop bumetanide and continue torsemide. Advised to monitor edema/shortness of breath.    Patient also reports dark, black stool x2 days (3 bowel movements total). Denies increased bruising/bleeding and blood in urine, however states has been \"cold recently\". Denies fever. States continues to take Eliquis 2.5 mg BID. Recommended to go to ED.    Will plan to follow-up next week, and adjust insulin as needed.    Thank you,  Sheryl Mayo, PharmD  "

## 2023-09-05 NOTE — PROGRESS NOTES
Pharmacy Post-Discharge Visit  Magno Zimmer is a 83 y.o. male was referred to Clinical Pharmacy Team to complete a post-discharge medication optimization and monitoring visit. The patient was referred for their diabetes, COPD, and heart failure.    Referring Provider: Jimi Orellana MD    HISTORY OF PRESENT ILLNESS  Spoke with patient and patient's daughter, Kacie today. Since last visit, patient went to ED for black stool and was admitted to hospital. Scope was completed with various cauterized areas, and patient is to schedule colonoscopy in 2 weeks. Eliquis was discontinued, as well as Jardiance. Discharging provider stated patient should only be taking two water pills, however discharge instructions were vague and patient/caregiver uncertain which medications patient is to be taking. Also concerns of retaining water; patient weighed himself during appointment (269.2 pounds) and patient typically 266 pounds. Patient denies shortness of breath or difficulty breathing, however states increasing leg edema.    Subjective   Allergies   Allergen Reactions    Heparin Unknown       [facility] - [place], [place] - [facility] [facility]  [address] [address]  Phone: [phone] Fax: [phone]    [id number] - [place], [place] - [address] [address]  [address] [facility] [address]  Phone: [phone] Fax: [phone]    [facility] - [place], [place] - [address]  [address]  [place] [address]  Phone: [phone] Fax: [phone]    [facility] - [place], [place] - [address]  [address] [place] [address]  Phone: [phone] Fax: [phone]    [id number] - [place], [place] - [address] [address]  [address] [address] [place] [address]  Phone: [phone] Fax: [phone]    Social History     Social History Narrative    Not on file      Objective     There were no vitals taken  for this visit.     LAB  Lab Results   Component Value Date    BILITOT 0.4 08/30/2023    CALCIUM 9.3 09/01/2023    CO2 24 09/01/2023    CL 99 09/01/2023    CREATININE 2.41 (H) 09/01/2023    GLUCOSE 141 (H) 09/01/2023    ALKPHOS 88 08/30/2023    K 4.3 09/01/2023    PROT 6.4 09/01/2023     (L) 09/01/2023    AST 17 08/30/2023    ALT 15 08/30/2023    BUN 79 (H) 09/01/2023    ANIONGAP 14 09/01/2023    MG 2.21 09/01/2023    PHOS 4.1 09/01/2023    ALBUMIN 3.6 09/01/2023    LIPASE 50 08/30/2023     Lab Results   Component Value Date    TRIG 128 03/16/2023    CHOL 118 03/16/2023    HDL 29.5 (A) 03/16/2023     Lab Results   Component Value Date    HGBA1C 7.8 (A) 08/30/2023     Current Outpatient Medications on File Prior to Visit   Medication Sig Dispense Refill    acetaminophen (Tylenol) 500 mg tablet Take 2 tablets (1,000 mg) by mouth 3 times a day.      albuterol 90 mcg/actuation inhaler Inhale 2 puffs every 6 hours if needed for wheezing.      allopurinol (Zyloprim) 300 mg tablet Take 1 tablet (300 mg) by mouth once daily. 90 tablet 3    amLODIPine (Norvasc) 5 mg tablet TAKE 1 TABLET BY MOUTH EVERY DAY UPON ARISING 90 tablet 3    apixaban (Eliquis) 2.5 mg tablet Take 1 tablet (2.5 mg) by mouth 2 times a day. 180 tablet 3    azithromycin (Zithromax) 250 mg tablet 2 tablets today, then 1 tablet for four days 6 tablet 0    B complex-vitamin C-folic acid (Nephrocaps) 1 mg capsule Take 1 capsule by mouth once daily. 90 capsule 3    brimonidine (AlphaGAN P) 0.15 % ophthalmic solution Administer 1 drop into affected eye(s) 3 times a day.      bumetanide (Bumex) 2 mg tablet Take 1 tablet (2 mg) by mouth once daily. 90 tablet 1    busPIRone (Buspar) 10 mg tablet Take 1 tablet (10 mg) by mouth in the morning and 1 tablet (10 mg) before bedtime. 180 tablet 3    cyanocobalamin (Vitamin B-12) 1,000 mcg tablet Take 1 tablet (1,000 mcg) by mouth once daily.      empagliflozin (Jardiance) 10 mg Take 1 tablet (10 mg) by mouth once  daily. 30 tablet 3    finasteride (Proscar) 5 mg tablet Take 1 tablet (5 mg) by mouth once daily. 90 tablet 3    FreeStyle Lynda reader (FreeStyle Lynda 2 [place]) misc Use as instructed 1 each 0    FreeStyle Lynda sensor system (FreeStyle Lynda 2 Sensor) kit Use as instructed to test blood sugar three times daily and as needed 2 each 5    gabapentin (Neurontin) 100 mg capsule Take 1 capsule (100 mg) by mouth once daily at bedtime. 90 capsule 3    hydroCHLOROthiazide (HYDRODiuril) 25 mg tablet Take 1 tablet (25 mg) by mouth once daily. 90 tablet 3    insulin lispro (HumaLOG) 100 unit/mL injection INJECT 5 UNIT 3 times daily with meals 15 mL 2    ipratropium-albuteroL (Duo-Neb) 0.5-2.5 mg/3 mL nebulizer solution USE 1 VIAL VIA NEBULIZER EVERY 8 HOURS Strength: 0.5-2.5 mg/3 mL 180 mL 3    latanoprost (Xalatan) 0.005 % ophthalmic solution Administer 1 drop into affected eye(s) once daily.      montelukast (Singulair) 10 mg tablet Take 1 tablet (10 mg) by mouth once daily in the evening. 90 tablet 3    nebulizer accessories kit Product SKU: [id number] 3 kit 2    nystatin (Mycostatin) 100,000 unit/gram powder Apply topically 2 times a day. 300 g 3    pantoprazole (ProtoNix) 40 mg EC tablet Take 1 tablet (40 mg) by mouth once daily in the morning. Take before meals. 90 tablet 3    potassium chloride CR 10 mEq ER tablet Take 1 tablet (10 mEq) by mouth once daily. 90 tablet 3    pravastatin (Pravachol) 40 mg tablet Take 1 tablet (40 mg) by mouth once daily. 90 tablet 3    spironolactone (Aldactone) 50 mg tablet Take 1 tablet (50 mg) by mouth once daily. 90 tablet 3    terazosin (Hytrin) 10 mg capsule Take 1 capsule (10 mg) by mouth once daily at bedtime. 90 capsule 3    torsemide (Demadex) 100 mg tablet Take 1 tablet (100 mg) by mouth once daily. 90 tablet 3    traZODone (Desyrel) 50 mg tablet Take 1 tablet (50 mg) by mouth as needed at bedtime for sleep. 90 tablet 0    umeclidinium-vilanteroL (Anoro Ellipta)  62.5-25 mcg/actuation blister with device Inhale 1 puff once daily. 3 each 3    zolpidem (Ambien) 10 mg tablet Take 1 tablet (10 mg) by mouth as needed at bedtime for sleep. 30 tablet 0    zolpidem (Ambien) 10 mg tablet Take 1 tablet (10 mg) by mouth as needed at bedtime for sleep. 90 tablet 0     No current facility-administered medications on file prior to visit.     Assessment/Plan   Problem List Items Addressed This Visit       Chronic atrial fibrillation (CMS/[facility])    COPD, severe (CMS/[facility])    Type 2 diabetes mellitus with stage 4 chronic kidney disease, without long-term current use of insulin (CMS/[facility])     RECOMMENDATIONS/PLAN  1. Diabetes   - Continue: Humalog 8 units TID with meals.   - Discontinue: Jardiance 10 mg daily  2. Insomnia - Continue trazodone 100 mg daily  3. Edema:   - Continue: torsemide and hydrochlorothiazide, and start testing blood pressure daily. Advised to monitor edema/weight gain, and hold spironolactone for now.   4. Freestyle CGM - awaiting response from [facility] to contact [facility] again    Clinical Pharmacist follow up: 9/19/2023 @ 3:00 pm @ [phone] (Kacie - patient's daughter). Patient confirmed follow-up appts with PCP and Specialists    Thank you,  Sheryl Mayo, PharmD     Verbal consent to manage patient's drug therapy was obtained from the patient and an individual authorized to act on behalf of a patient. They were informed they may decline to participate or withdraw from participation in pharmacy services at any time.

## 2023-09-11 NOTE — TELEPHONE ENCOUNTER
Recent Visits  Date Type Provider Dept   08/03/23 Office Visit Jimi Orellana MD Do [id number] [facility]   05/03/23 Office Visit Jimi Orellana MD Do [id number]   04/04/23 Office Visit Jimi Orellana MD Do [id number] [facility]   Showing recent visits within past 540 days and meeting all other requirements  Future Appointments  Date Type Provider Dept   09/21/23 Appointment Jimi Orellana MD Do [id number] [facility]   Showing future appointments within next 180 days and meeting all other requirements

## 2023-09-18 PROBLEM — N28.9 ACUTE ON CHRONIC RENAL INSUFFICIENCY: Status: ACTIVE | Noted: 2023-01-01

## 2023-09-18 PROBLEM — R55 SYNCOPE: Status: ACTIVE | Noted: 2023-01-01

## 2023-09-18 PROBLEM — Z99.81 DEPENDENCE ON SUPPLEMENTAL OXYGEN: Status: ACTIVE | Noted: 2023-01-01

## 2023-09-18 PROBLEM — N30.00 ACUTE CYSTITIS WITHOUT HEMATURIA: Status: ACTIVE | Noted: 2023-01-01

## 2023-09-18 PROBLEM — K92.1 MELENA: Status: ACTIVE | Noted: 2023-01-01

## 2023-09-18 PROBLEM — J96.11 CHRONIC RESPIRATORY FAILURE WITH HYPOXIA (MULTI): Status: ACTIVE | Noted: 2023-01-01

## 2023-09-18 PROBLEM — K92.2 GI BLEED: Status: ACTIVE | Noted: 2023-01-01

## 2023-09-18 PROBLEM — E87.1 HYPONATREMIA: Status: ACTIVE | Noted: 2023-01-01

## 2023-09-18 PROBLEM — E86.0 DEHYDRATION: Status: ACTIVE | Noted: 2023-01-01

## 2023-09-18 PROBLEM — R57.9 SHOCK (MULTI): Status: ACTIVE | Noted: 2023-01-01

## 2023-09-18 PROBLEM — N18.9 ACUTE ON CHRONIC RENAL INSUFFICIENCY: Status: ACTIVE | Noted: 2023-01-01

## 2023-09-18 PROBLEM — E11.22 STAGE 4 CHRONIC KIDNEY DISEASE DUE TO DIABETES MELLITUS (MULTI): Status: ACTIVE | Noted: 2023-01-01

## 2023-09-18 PROBLEM — M54.2 CERVICALGIA: Status: ACTIVE | Noted: 2023-01-01

## 2023-09-18 PROBLEM — E66.9 OBESITY (BMI 35.0-39.9 WITHOUT COMORBIDITY): Status: ACTIVE | Noted: 2023-01-01

## 2023-09-18 PROBLEM — M54.2 NECK PAIN: Status: ACTIVE | Noted: 2023-01-01

## 2023-09-18 PROBLEM — R65.21 SEPTIC SHOCK (MULTI): Status: ACTIVE | Noted: 2023-01-01

## 2023-09-18 PROBLEM — I50.32 CHRONIC DIASTOLIC HEART FAILURE (MULTI): Status: ACTIVE | Noted: 2023-01-01

## 2023-09-18 PROBLEM — E87.20 LACTIC ACIDOSIS: Status: ACTIVE | Noted: 2023-01-01

## 2023-09-18 PROBLEM — A41.9 SEPTIC SHOCK (MULTI): Status: ACTIVE | Noted: 2023-01-01

## 2023-09-18 PROBLEM — A41.9 SEPSIS (MULTI): Status: ACTIVE | Noted: 2023-01-01

## 2023-09-18 PROBLEM — N18.4 STAGE 4 CHRONIC KIDNEY DISEASE DUE TO DIABETES MELLITUS (MULTI): Status: ACTIVE | Noted: 2023-01-01

## 2023-09-18 PROBLEM — I11.0 HYPERTENSIVE HEART DISEASE WITH HEART FAILURE (MULTI): Status: ACTIVE | Noted: 2023-01-01

## 2023-09-18 PROBLEM — R19.5 DARK STOOLS: Status: ACTIVE | Noted: 2023-01-01

## 2023-09-18 NOTE — PROGRESS NOTES
MRN:[id number]     Subjective   Magno Zimmer is a 83 y.o. male who is seen today for hospital follow-up.  The patient was admitted to [facility] on 9/12/2023 after a witnessed fall at his independent living facility.  He was also confused when EMS arrived.  In the ER his BP was 73/33 with heart rate in the 60s.  He received a total of 4 L IV fluids in the ER and then admitted to ICU for septic, and likely also hypovolemic, shock secondary to pneumonia and UTI. WBCc 11.7 and lactate 4.9. He was started on levophed and vasopressin.  His mentation improved with fluid replacement and improved blood pressures.  He was started on IV antibiotics and then converted to PO Levaquin for an additional 3 days at hospital discharge.  He was also found to have RAKESH on CKD stage IV with a creatinine of 3.33, but improved to his baseline Cr 2.12 and eGFR 30 by 9/178/23.  The patient was discharged to [facility] on 9/1/23 for PT/OT/SN.   Review of Systems   Constitutional:  Negative for chills and fever.   Respiratory:  Negative for cough, shortness of breath and wheezing.    Cardiovascular:  Positive for leg swelling. Negative for chest pain and palpitations.   Genitourinary:  Negative for dysuria.   Neurological:  Negative for tremors.      Objective   Lab Results   Component Value Date    GLUCOSE 96 09/17/2023    GLUCOSE 96 09/17/2023    CALCIUM 8.8 09/17/2023    CALCIUM 8.8 09/17/2023     09/17/2023     09/17/2023    K 4.2 09/17/2023    K 4.1 09/17/2023    CO2 24 09/17/2023    CO2 24 09/17/2023     09/17/2023     09/17/2023    BUN 62 (H) 09/17/2023    BUN 62 (H) 09/17/2023    CREATININE 2.14 (H) 09/17/2023    CREATININE 2.12 (H) 09/17/2023      Lab Results   Component Value Date    WBC 11.7 (H) 09/17/2023    HGB 9.2 (L) 09/17/2023    HCT 28.8 (L) 09/17/2023    MCV 98 09/17/2023     09/17/2023    Physical Exam  Constitutional:       Appearance: Normal appearance.   HENT:      Head:  Normocephalic.   Eyes:      Conjunctiva/sclera: Conjunctivae normal.   Cardiovascular:      Rate and Rhythm: Normal rate and regular rhythm.      Heart sounds: No murmur heard.  Pulmonary:      Effort: Pulmonary effort is normal. No respiratory distress.      Breath sounds: Examination of the left-lower field reveals rales. Rales present.      Comments: On R.A.  Abdominal:      General: Bowel sounds are normal.      Palpations: Abdomen is soft.   Musculoskeletal:      Cervical back: Normal range of motion and neck supple.      Right lower le+ Edema present.      Left lower le+ Edema present.   Neurological:      Mental Status: He is alert.     Assessment    Problem List Items Addressed This Visit       Chronic atrial fibrillation (CMS/HCC)     Currently in a regular rhythm.  He is not on anticoagulation due to recent GI bleed.  Rate is adequately controlled.  Not on beta-blocker.         COPD, severe (CMS/[facility])     Previously had been on 2 L O2 at home, but he is now on room air with O2 sat in the high 90s.  No respiratory distress.  Continue Anoro Ellipta and as needed DuoNebs.         Primary insomnia    Relevant Medications    zolpidem (Ambien) 5 mg tablet    Type 2 diabetes mellitus with stage 4 chronic kidney disease, without long-term current use of insulin (CMS/[facility])     Nursing will check blood sugars before meals and at bedtime.  Continue Humalog 8 units 3 times daily with meals.  He is not currently on any long-acting insulin, oral antihyperglycemics.  May consider Jardiance once baseline blood sugars are established given that he also has HFpEF.         Pneumonia of left lower lobe due to infectious organism    Stage 4 chronic kidney disease (CMS/[facility])    (HFpEF) heart failure with preserved ejection fraction (CMS/[facility])     He was significantly volume depleted on hospital admission, but received at least 4 L of IV fluids while there.  He now has.  2+ BLE edema and mild left basilar crackles.  He is  not currently on beta-blockers, ACE or ARB, SGLT2 inhibitor, or diuretics.  He is hypertensive.  We will add Jardiance 10 mg and lisinopril 2.5 mg.         Obesity (BMI 35.0-39.9 without comorbidity)    Septic shock (CMS/[facility]) - Primary     Treated with IV antibiotics, IV fluids, and vasopressors during this hospitalization.  He is now back to his previous baseline.  Continue oral Levaquin for 3 days.         Stage 4 chronic kidney disease due to diabetes mellitus (CMS/[facility])    Acute cystitis without hematuria     No longer symptomatic.  Continue oral Levaquin for total 3 days then DC.        Meds, orders, nursing notes reviewed. Hospital progress notes, labs, imaging results, and discharge summary reviewed. Discharge medications reconciled at receiving facility.  Nurse will monitor and update physician of any change: Discussed with patient and provided information.  Supportive care will be given.  Dr. Cruz will follow-up tomorrow.

## 2023-09-18 NOTE — TELEPHONE ENCOUNTER
LUCIUS CALLED STATING PATIENTS BP /75 AND HE HAS GAINED 3LBS. SHE IS WANTING TO KNOW IF HE SHOULD BE ON A DIURETIC   PLEASE ADVISE   LUCIUS CALL BACK [phone]

## 2023-09-18 NOTE — LETTER
Patient: Magno Zimmer  : 1940    Encounter Date: 2023    MRN:[id number]     Subjective  Magno Zimmer is a 83 y.o. male who is seen today for hospital follow-up.  The patient was admitted to [facility] on 2023 after a witnessed fall at his independent living facility.  He was also confused when EMS arrived.  In the ER his BP was 73/33 with heart rate in the 60s.  He received a total of 4 L IV fluids in the ER and then admitted to ICU for septic, and likely also hypovolemic, shock secondary to pneumonia and UTI. WBCc 11.7 and lactate 4.9. He was started on levophed and vasopressin.  His mentation improved with fluid replacement and improved blood pressures.  He was started on IV antibiotics and then converted to PO Levaquin for an additional 3 days at hospital discharge.  He was also found to have RAKESH on CKD stage IV with a creatinine of 3.33, but improved to his baseline Cr 2.12 and eGFR 30 by .  The patient was discharged to [facility] on 23 for PT/OT/SN.   Review of Systems   Constitutional:  Negative for chills and fever.   Respiratory:  Negative for cough, shortness of breath and wheezing.    Cardiovascular:  Positive for leg swelling. Negative for chest pain and palpitations.   Genitourinary:  Negative for dysuria.   Neurological:  Negative for tremors.      Objective  Lab Results   Component Value Date    GLUCOSE 96 2023    GLUCOSE 96 2023    CALCIUM 8.8 2023    CALCIUM 8.8 2023     2023     2023    K 4.2 2023    K 4.1 2023    CO2 24 2023    CO2 24 2023     2023     2023    BUN 62 (H) 2023    BUN 62 (H) 2023    CREATININE 2.14 (H) 2023    CREATININE 2.12 (H) 2023      Lab Results   Component Value Date    WBC 11.7 (H) 2023    HGB 9.2 (L) 2023    HCT 28.8 (L) 2023    MCV 98 2023     2023    Physical  Exam  Constitutional:       Appearance: Normal appearance.   HENT:      Head: Normocephalic.   Eyes:      Conjunctiva/sclera: Conjunctivae normal.   Cardiovascular:      Rate and Rhythm: Normal rate and regular rhythm.      Heart sounds: No murmur heard.  Pulmonary:      Effort: Pulmonary effort is normal. No respiratory distress.      Breath sounds: Examination of the left-lower field reveals rales. Rales present.      Comments: On R.A.  Abdominal:      General: Bowel sounds are normal.      Palpations: Abdomen is soft.   Musculoskeletal:      Cervical back: Normal range of motion and neck supple.      Right lower le+ Edema present.      Left lower le+ Edema present.   Neurological:      Mental Status: He is alert.     Assessment   Problem List Items Addressed This Visit       Chronic atrial fibrillation (CMS/HCC)     Currently in a regular rhythm.  He is not on anticoagulation due to recent GI bleed.  Rate is adequately controlled.  Not on beta-blocker.         COPD, severe (CMS/HCC)     Previously had been on 2 L O2 at home, but he is now on room air with O2 sat in the high 90s.  No respiratory distress.  Continue Anoro Ellipta and as needed DuoNebs.         Primary insomnia    Relevant Medications    zolpidem (Ambien) 5 mg tablet    Type 2 diabetes mellitus with stage 4 chronic kidney disease, without long-term current use of insulin (CMS/[facility])     Nursing will check blood sugars before meals and at bedtime.  Continue Humalog 8 units 3 times daily with meals.  He is not currently on any long-acting insulin, oral antihyperglycemics.  May consider Jardiance once baseline blood sugars are established given that he also has HFpEF.         Pneumonia of left lower lobe due to infectious organism    Stage 4 chronic kidney disease (CMS/HCC)    (HFpEF) heart failure with preserved ejection fraction (CMS/HCC)     He was significantly volume depleted on hospital admission, but received at least 4 L of IV fluids  while there.  He now has.  2+ BLE edema and mild left basilar crackles.  He is not currently on beta-blockers, ACE or ARB, SGLT2 inhibitor, or diuretics.  He is hypertensive.  We will add Jardiance 10 mg and lisinopril 2.5 mg.         Obesity (BMI 35.0-39.9 without comorbidity)    Septic shock (CMS/HCC) - Primary     Treated with IV antibiotics, IV fluids, and vasopressors during this hospitalization.  He is now back to his previous baseline.  Continue oral Levaquin for 3 days.         Stage 4 chronic kidney disease due to diabetes mellitus (CMS/[facility])    Acute cystitis without hematuria     No longer symptomatic.  Continue oral Levaquin for total 3 days then DC.        Meds, orders, nursing notes reviewed. Hospital progress notes, labs, imaging results, and discharge summary reviewed. Discharge medications reconciled at receiving facility.  Nurse will monitor and update physician of any change: Discussed with patient and provided information.  Supportive care will be given.  Dr. Cruz will follow-up tomorrow.      Electronically Signed By: FAITH Dallas   9/18/23  9:36 PM

## 2023-09-19 NOTE — ASSESSMENT & PLAN NOTE
He was significantly volume depleted on hospital admission, but received at least 4 L of IV fluids while there.  He now has.  2+ BLE edema and mild left basilar crackles.  He is not currently on beta-blockers, ACE or ARB, SGLT2 inhibitor, or diuretics.  He is hypertensive.  We will add Jardiance 10 mg and lisinopril 2.5 mg.

## 2023-09-19 NOTE — ASSESSMENT & PLAN NOTE
Previously had been on 2 L O2 at home, but he is now on room air with O2 sat in the high 90s.  No respiratory distress.  Continue Anoro Ellipta and as needed DuoNebs.

## 2023-09-19 NOTE — ASSESSMENT & PLAN NOTE
Currently in a regular rhythm.  He is not on anticoagulation due to recent GI bleed.  Rate is adequately controlled.  Not on beta-blocker.

## 2023-09-19 NOTE — ASSESSMENT & PLAN NOTE
Treated with IV antibiotics, IV fluids, and vasopressors during this hospitalization.  He is now back to his previous baseline.  Continue oral Levaquin for 3 days.

## 2023-09-19 NOTE — ASSESSMENT & PLAN NOTE
Nursing will check blood sugars before meals and at bedtime.  Continue Humalog 8 units 3 times daily with meals.  He is not currently on any long-acting insulin, oral antihyperglycemics.  May consider Jardiance once baseline blood sugars are established given that he also has HFpEF.

## 2023-09-20 PROBLEM — J18.9 PNEUMONIA OF LEFT LOWER LOBE DUE TO INFECTIOUS ORGANISM: Status: RESOLVED | Noted: 2023-01-01 | Resolved: 2023-01-01

## 2023-09-20 PROBLEM — N30.00 ACUTE CYSTITIS WITHOUT HEMATURIA: Status: RESOLVED | Noted: 2023-01-01 | Resolved: 2023-01-01

## 2023-09-20 PROBLEM — R19.5 DARK STOOLS: Status: RESOLVED | Noted: 2023-01-01 | Resolved: 2023-01-01

## 2023-09-20 PROBLEM — N18.9 ACUTE ON CHRONIC RENAL INSUFFICIENCY: Status: RESOLVED | Noted: 2023-01-01 | Resolved: 2023-01-01

## 2023-09-20 PROBLEM — A41.9 SEPTIC SHOCK (MULTI): Status: RESOLVED | Noted: 2023-01-01 | Resolved: 2023-01-01

## 2023-09-20 PROBLEM — E86.0 DEHYDRATION: Status: RESOLVED | Noted: 2023-01-01 | Resolved: 2023-01-01

## 2023-09-20 PROBLEM — I10 HTN (HYPERTENSION), BENIGN: Status: RESOLVED | Noted: 2023-01-01 | Resolved: 2023-01-01

## 2023-09-20 PROBLEM — R55 SYNCOPE: Status: RESOLVED | Noted: 2023-01-01 | Resolved: 2023-01-01

## 2023-09-20 PROBLEM — N28.9 ACUTE ON CHRONIC RENAL INSUFFICIENCY: Status: RESOLVED | Noted: 2023-01-01 | Resolved: 2023-01-01

## 2023-09-20 PROBLEM — K92.1 MELENA: Status: RESOLVED | Noted: 2023-01-01 | Resolved: 2023-01-01

## 2023-09-20 PROBLEM — E87.20 LACTIC ACIDOSIS: Status: RESOLVED | Noted: 2023-01-01 | Resolved: 2023-01-01

## 2023-09-20 PROBLEM — M79.2 NEUROPATHIC PAIN, LEG: Status: RESOLVED | Noted: 2023-01-01 | Resolved: 2023-01-01

## 2023-09-20 PROBLEM — E87.1 HYPONATREMIA: Status: RESOLVED | Noted: 2023-01-01 | Resolved: 2023-01-01

## 2023-09-20 PROBLEM — R25.1 TREMOR: Status: RESOLVED | Noted: 2023-01-01 | Resolved: 2023-01-01

## 2023-09-20 PROBLEM — M54.2 NECK PAIN: Status: RESOLVED | Noted: 2023-01-01 | Resolved: 2023-01-01

## 2023-09-20 PROBLEM — K92.2 GI BLEED: Status: RESOLVED | Noted: 2023-01-01 | Resolved: 2023-01-01

## 2023-09-20 PROBLEM — R65.21 SEPTIC SHOCK (MULTI): Status: RESOLVED | Noted: 2023-01-01 | Resolved: 2023-01-01

## 2023-09-20 PROBLEM — N18.4 STAGE 4 CHRONIC KIDNEY DISEASE (MULTI): Status: RESOLVED | Noted: 2023-01-01 | Resolved: 2023-01-01

## 2023-09-20 NOTE — LETTER
Patient: Magno Zimmer  : 1940    Encounter Date: 2023    Visit  Note   Subjective  Magno Zimmer is a 83 y.o. male who is being seen and evaluated for multiple medical problems. Nursing notes, vital signs, and labs were reviewed in the local facility chart.    HPI   Review of the chart indicates that this 83-year-old male was admitted to the hospital on 2023 with septicemia secondary to UTI and pneumonia.  He received IV fluids and managed meant in the intensive care unit.  He was started on IV antibiotics but then converted to oral Levaquin.  He additionally had acute on chronic kidney disease with a creatinine of 3.3 which improved to 2.12 by the time of discharge.  He comes here for ongoing PT/OT, supportive care, monitoring before his planned return to independent living. His wife is present and supplies much of the history.  Objective  There were no vitals taken for this visit.  Physical Exam  Constitutional:       Appearance: Normal appearance.   HENT:      Head: Normocephalic.   Eyes:      Conjunctiva/sclera: Conjunctivae normal.   Cardiovascular:      Rate and Rhythm: Normal rate and regular rhythm.   Pulmonary:      Effort: Pulmonary effort is normal.      Breath sounds: Normal breath sounds.   Musculoskeletal:      Cervical back: Neck supple.      Right lower leg: Edema present.      Left lower leg: Edema present.      Comments: 3+ pitting to knees   Skin:     General: Skin is warm and dry.   Neurological:      Mental Status: He is alert.       Assessment/Planthis 83-year-old male is admitted here after a hospitalization for  Septicemia secondary to UTI and pneumonia.  He was placed on IV fluids and antibiotics and recovered quickly being transition to oral Levaquin which we will complete today.  He has had a significant decrease in ability to perform ADLs and weakness which we will address by aggressive PT/OT while here.  He plans to return home where he lives independently with  his wife.    He was also treated for acute on chronic kidney injury which returned almost to baseline at the time of hospital discharge.  We will check numbers again next week to ensure that this is back to baseline and remaining there.  Problem List Items Addressed This Visit       Chronic atrial fibrillation (CMS/[facility])     Currently in a regular rhythm.  He is not on anticoagulation due to recent GI bleed.  Rate is adequately controlled.  Not on beta-blocker.         Primary insomnia     Continue ambien and trazadone but if his confusion does not improve rapidly I would recommend eliminating the ambien         Type 2 diabetes mellitus with stage 4 chronic kidney disease, without long-term current use of insulin (CMS/[facility])     Nursing will check blood sugars before meals and at bedtime.  Continue Humalog 8 units 3 times daily with meals.  He is not currently on any long-acting insulin, oral antihyperglycemics.  May consider Jardiance once baseline blood sugars are established given that he also has HFpEF. Recent A1C is at age adjusted goal.         RESOLVED: Pneumonia of left lower lobe due to infectious organism    Chronic diastolic heart failure (CMS/[facility])    RESOLVED: Acute on chronic renal insufficiency    Stage 4 chronic kidney disease due to diabetes mellitus (CMS/[facility])    RESOLVED: Acute cystitis without hematuria - Primary          Electronically Signed By: Aguilar Cruz MD   9/20/23 11:00 AM

## 2023-09-20 NOTE — ASSESSMENT & PLAN NOTE
Nursing will check blood sugars before meals and at bedtime.  Continue Humalog 8 units 3 times daily with meals.  He is not currently on any long-acting insulin, oral antihyperglycemics.  May consider Jardiance once baseline blood sugars are established given that he also has HFpEF. Recent A1C is at age adjusted goal.

## 2023-09-20 NOTE — ASSESSMENT & PLAN NOTE
Continue ambien and trazadone but if his confusion does not improve rapidly I would recommend eliminating the ambien

## 2023-09-20 NOTE — PROGRESS NOTES
Visit  Note   Subjective   Magno Zimmer is a 83 y.o. male who is being seen and evaluated for multiple medical problems. Nursing notes, vital signs, and labs were reviewed in the local facility chart.    HPI   Review of the chart indicates that this 83-year-old male was admitted to the hospital on 9/12/2023 with septicemia secondary to UTI and pneumonia.  He received IV fluids and managed meant in the intensive care unit.  He was started on IV antibiotics but then converted to oral Levaquin.  He additionally had acute on chronic kidney disease with a creatinine of 3.3 which improved to 2.12 by the time of discharge.  He comes here for ongoing PT/OT, supportive care, monitoring before his planned return to independent living. His wife is present and supplies much of the history.  Objective   There were no vitals taken for this visit.  Physical Exam  Constitutional:       Appearance: Normal appearance.   HENT:      Head: Normocephalic.   Eyes:      Conjunctiva/sclera: Conjunctivae normal.   Cardiovascular:      Rate and Rhythm: Normal rate and regular rhythm.   Pulmonary:      Effort: Pulmonary effort is normal.      Breath sounds: Normal breath sounds.   Musculoskeletal:      Cervical back: Neck supple.      Right lower leg: Edema present.      Left lower leg: Edema present.      Comments: 3+ pitting to knees   Skin:     General: Skin is warm and dry.   Neurological:      Mental Status: He is alert.       Assessment/Plan this 83-year-old male is admitted here after a hospitalization for  Septicemia secondary to UTI and pneumonia.  He was placed on IV fluids and antibiotics and recovered quickly being transition to oral Levaquin which we will complete today.  He has had a significant decrease in ability to perform ADLs and weakness which we will address by aggressive PT/OT while here.  He plans to return home where he lives independently with his wife.    He was also treated for acute on chronic kidney injury  which returned almost to baseline at the time of hospital discharge.  We will check numbers again next week to ensure that this is back to baseline and remaining there.  Problem List Items Addressed This Visit       Chronic atrial fibrillation (CMS/[facility])     Currently in a regular rhythm.  He is not on anticoagulation due to recent GI bleed.  Rate is adequately controlled.  Not on beta-blocker.         Primary insomnia     Continue ambien and trazadone but if his confusion does not improve rapidly I would recommend eliminating the ambien         Type 2 diabetes mellitus with stage 4 chronic kidney disease, without long-term current use of insulin (CMS/[facility])     Nursing will check blood sugars before meals and at bedtime.  Continue Humalog 8 units 3 times daily with meals.  He is not currently on any long-acting insulin, oral antihyperglycemics.  May consider Jardiance once baseline blood sugars are established given that he also has HFpEF. Recent A1C is at age adjusted goal.         RESOLVED: Pneumonia of left lower lobe due to infectious organism    Chronic diastolic heart failure (CMS/[facility])    RESOLVED: Acute on chronic renal insufficiency    Stage 4 chronic kidney disease due to diabetes mellitus (CMS/[facility])    RESOLVED: Acute cystitis without hematuria - Primary

## 2023-09-22 NOTE — TELEPHONE ENCOUNTER
JIMMIE Spoke w/ daughter Kacie Collazo (on HIPAA) she would like to confirm if patient's Nephrologist called in Lasix prior to asking if Esteban could fax an order to [facility] for Rx Bumex. She notes is his currently there for SN.

## 2023-09-27 NOTE — PROGRESS NOTES
Discharge Facility: [facility]  Discharge Diagnosis: Septic Shock secondary to pneumonia  Admission Date: 9/17/2023  Discharge Date: 9/26/2023    PCP Appointment Date: 9/28/2023  Specialist Appointment Date: D  Hospital Encounter and Summary: Linked  See discharge assessment below for further details  Engagement  Call Start Time: 1302 (9/27/2023  1:11 PM)    Medications  Medications reviewed with patient/caregiver?: Yes (new meds/changes discussed) (9/27/2023  1:11 PM)  Is the patient having any side effects they believe may be caused by any medication additions or changes?: No (9/27/2023  1:11 PM)  Does the patient have all medications ordered at discharge?: Yes (9/27/2023  1:11 PM)  Care Management Interventions: No intervention needed (9/27/2023  1:11 PM)  Prescription Comments: see med list (levofloxacin; pantoprazole) (9/27/2023  1:11 PM)  Is the patient taking all medications as directed (includes completed medication regime)?: Yes (9/27/2023  1:11 PM)  Care Management Interventions: Provided patient education (9/27/2023  1:11 PM)    Appointments  Does the patient have a primary care provider?: Yes (9/27/2023  1:11 PM)  Care Management Interventions: Verified appointment date/time/provider (9/27/2023  1:11 PM)  Has the patient kept scheduled appointments due by today?: Yes (9/27/2023  1:11 PM)  Care Management Interventions: Advised patient to keep appointment (9/27/2023  1:11 PM)    Self Management  What is the home health agency?: Daughter living with patient at time and helping care for them until she returns to [place] on Saturday (9/27/2023  1:11 PM)    Patient Teaching  Does the patient have access to their discharge instructions?: Yes (9/27/2023  1:11 PM)  Care Management Interventions: Reviewed instructions with patient (9/27/2023  1:11 PM)  What is the patient's perception of their health status since discharge?: Returned to baseline/stable (9/27/2023  1:11 PM)  Is the  patient/caregiver able to teach back the hierarchy of who to call/visit for symptoms/problems? PCP, Specialist, Home Health nurse, Urgent Care, ED, 911: Yes (9/27/2023  1:11 PM)  Patient/Caregiver Education Comments: Spoke to patient's daughter Kacie who states she is trying to get everything arranged before she leaves to go back to [place] on Saturday. Helped schedule with PCP as she wants to ensure medications are correct before she leaves. (9/27/2023  1:11 PM)

## 2023-09-28 PROBLEM — F33.41 RECURRENT MAJOR DEPRESSIVE DISORDER, IN PARTIAL REMISSION (CMS-HCC): Status: ACTIVE | Noted: 2023-01-01

## 2023-09-28 PROBLEM — K64.9 HEMORRHOIDS: Status: ACTIVE | Noted: 2023-01-01

## 2023-09-28 PROBLEM — N47.1 PHIMOSIS: Status: ACTIVE | Noted: 2023-01-01

## 2023-09-28 NOTE — TELEPHONE ENCOUNTER
Per Drug is covered by current benefit plan. No further PA activity needed   Preferred    Please deny rx.

## 2023-09-28 NOTE — TELEPHONE ENCOUNTER
Unable to get at [facility]. They recommend [facility]. [facility] to see if they have it in stock or if we have to use a mail out pharmacy.

## 2023-09-29 NOTE — PROGRESS NOTES
Pharmacy Post-Discharge Visit: Follow-Up  Magno Zimmer is a 83 y.o. male was referred to Clinical Pharmacy Team to complete a post-discharge medication optimization and monitoring visit.  The patient was referred for their Diabetes, COPD, and Congestive Heart Failure.    Referring Provider: Jimi Orellana MD    Subjective   Allergies   Allergen Reactions    Heparin Unknown       [facility] - [place], [place] - [address]  [address] [address]  Phone: [phone] Fax: [phone]    [id number] - [place], [place] - [address] [address]  [address] [address] [place] [address]  Phone: [phone] Fax: [phone]    [facility] - [place], [place] - [address]  [address]  [facility] [address]  Phone: [phone] Fax: [phone]    [facility] - [place], [place] - [address]  [address] [place] [facility] [address]  Phone: [phone] Fax: [phone]    [facility] - [place], [place] - [address]  [address] [address] [facility] [address]  Phone: [phone] Fax: [phone]    [place], [place] - [address]  [address] [address]  Phone: [phone] Fax: [phone]      Social History     Social History Narrative    Not on file      Spoke with patient's daughter, Kacie today. Since last visit, patient went to ED for black stool and was admitted to hospital. Scope was completed with various cauterized areas, and patient is to schedule colonoscopy in 2 weeks. Patient was then sent to acute care rehab facility and has recently returned back to his apartment. Eliquis and Jardiance were both restarted and Januvia was added onto patient's diabetes regimen. Patient has started Freestyle and is pleased with it so far.     After follow-up visits with nephrologist/cardiology, patient was placed on Lasix every other day on top of Bumex for  edema, and other medications (Amlodipine, Torsemide/hydrochlorothiazide and Spironolactone) were stopped. Discussion with pharmacy raised concerns about dehydration from excess fluid loss with both Lasix and Bumex on board, so patient is now only taking Bumex. Patient denies any shortness of breath and edema at the moment. BP and BG well within normal ranges.       Diabetes  He presents for his follow-up diabetic visit. He has type 2 diabetes mellitus. His disease course has been stable. There are no hypoglycemic associated symptoms. There are no diabetic associated symptoms. There are no hypoglycemic complications. Current diabetic treatment includes oral agent (dual therapy) and insulin injections (Humalog 8 units TID, Jardiance 10 mg daily, Januvia 25 mg daily). His weight is stable. There is no change in his home blood glucose trend. His overall blood glucose range is 110-130 mg/dl.     Objective     There were no vitals taken for this visit.     LAB  Lab Results   Component Value Date    BILITOT 0.4 09/17/2023    CALCIUM 8.8 09/17/2023    CALCIUM 8.8 09/17/2023    CO2 24 09/17/2023    CO2 24 09/17/2023     09/17/2023     09/17/2023    CREATININE 2.14 (H) 09/17/2023    CREATININE 2.12 (H) 09/17/2023    GLUCOSE 96 09/17/2023    GLUCOSE 96 09/17/2023    ALKPHOS 72 09/17/2023    K 4.2 09/17/2023    K 4.1 09/17/2023    PROT 6.1 (L) 09/17/2023     09/17/2023     09/17/2023    AST 14 09/17/2023    ALT 13 09/17/2023    BUN 62 (H) 09/17/2023    BUN 62 (H) 09/17/2023    ANIONGAP 13 09/17/2023    ANIONGAP 13 09/17/2023    MG 2.63 (H) 09/17/2023    PHOS 4.0 09/17/2023    PHOS 3.9 09/17/2023    ALBUMIN 3.5 09/17/2023    ALBUMIN 3.5 09/17/2023    LIPASE 33 09/12/2023     Lab Results   Component Value Date    TRIG 128 03/16/2023    CHOL 118 03/16/2023    HDL 29.5 (A) 03/16/2023     Lab Results   Component Value Date    HGBA1C 7.8 (A) 08/30/2023         Current Outpatient Medications on File Prior to  Visit   Medication Sig Dispense Refill    Coloplast Paste paste 1 EACH 2 TIMES A DAY AS NEEDED (ITCHING). 71 g 2    acetaminophen (Tylenol) 500 mg tablet Take 2 tablets (1,000 mg) by mouth 3 times a day.      albuterol 90 mcg/actuation inhaler Inhale 2 puffs every 6 hours if needed for wheezing.      allopurinol (Zyloprim) 300 mg tablet Take 1 tablet (300 mg) by mouth once daily. 90 tablet 3    amLODIPine (Norvasc) 5 mg tablet TAKE 1 TABLET BY MOUTH EVERY DAY UPON ARISING 90 tablet 3    apixaban (Eliquis) 2.5 mg tablet Take 1 tablet (2.5 mg) by mouth 2 times a day.      B complex-vitamin C-folic acid (Nephrocaps) 1 mg capsule Take 1 capsule by mouth once daily. 90 capsule 3    brimonidine (AlphaGAN P) 0.15 % ophthalmic solution Administer 1 drop into affected eye(s) 3 times a day.      bumetanide (Bumex) 2 mg tablet Take 1 tablet (2 mg) by mouth once daily. 90 tablet 1    busPIRone (Buspar) 10 mg tablet Take 1 tablet (10 mg) by mouth in the morning and 1 tablet (10 mg) before bedtime. 180 tablet 3    cephalexin (Keflex) 500 mg capsule TAKE 1 CAPSULE THREE TIMES A DAY FOR 10 DAYS 30 capsule 35    cyanocobalamin (Vitamin B-12) 1,000 mcg tablet Take 1 tablet (1,000 mcg) by mouth once daily.      doxycycline (Monodox) 100 mg capsule Take 1 capsule (100 mg) by mouth 2 times a day. Take with at least 8 ounces (large glass) of water, do not lie down for 30 minutes after      empagliflozin (Jardiance) 25 mg Take 1 tablet (25 mg) by mouth once daily.      escitalopram (Lexapro) 10 mg tablet Take 1 tablet (10 mg) by mouth once daily. 90 tablet 3    finasteride (Proscar) 5 mg tablet Take 1 tablet (5 mg) by mouth once daily. 90 tablet 3    FreeStyle Lynda reader (FreeStyle Lynda 2 [place]) misc Use as instructed 1 each 0    FreeStyle Lynda sensor system (FreeStyle Lynda 2 Sensor) kit Use as instructed to test blood sugar three times daily and as needed 2 each 5    furosemide (Lasix) 20 mg tablet 1 tablet every other day 45  tablet 3    furosemide (Lasix) 20 mg tablet 1 tablet every other day 15 tablet 0    hydroCHLOROthiazide (HYDRODiuril) 25 mg tablet Take 1 tablet (25 mg) by mouth once daily. 90 tablet 3    ipratropium-albuteroL (Duo-Neb) 0.5-2.5 mg/3 mL nebulizer solution USE 1 VIAL VIA NEBULIZER EVERY 8 HOURS Strength: 0.5-2.5 mg/3 mL 180 mL 3    Januvia 25 mg tablet TAKE 1 TABLET BY MOUTH EVERY DAY 30 tablet 0    latanoprost (Xalatan) 0.005 % ophthalmic solution Administer 1 drop into affected eye(s) once daily.      montelukast (Singulair) 10 mg tablet Take 1 tablet (10 mg) by mouth once daily in the evening. 90 tablet 3    nebulizer accessories kit Product SKU: [id number] 3 kit 2    nystatin (Mycostatin) 100,000 unit/gram powder Apply topically 2 times a day. 300 g 3    pantoprazole (ProtoNix) 40 mg EC tablet Take 1 tablet (40 mg) by mouth once daily in the morning. Take before meals. (Patient taking differently: Take 1 tablet (40 mg) by mouth 2 times a day.) 90 tablet 3    potassium chloride CR 10 mEq ER tablet Take 1 tablet (10 mEq) by mouth once daily. 90 tablet 3    pravastatin (Pravachol) 40 mg tablet Take 1 tablet (40 mg) by mouth once daily. 90 tablet 3    SITagliptin phosphate (Januvia) 25 mg tablet Take 1 tablet (25 mg) by mouth once daily. 90 tablet 3    spironolactone (Aldactone) 50 mg tablet Take 1 tablet (50 mg) by mouth once daily. 90 tablet 3    terazosin (Hytrin) 10 mg capsule Take 1 capsule (10 mg) by mouth once daily at bedtime. 90 capsule 3    torsemide (Demadex) 100 mg tablet Take 1 tablet (100 mg) by mouth once daily. 90 tablet 3    traZODone (Desyrel) 50 mg tablet Take 1 tablet (50 mg) by mouth as needed at bedtime for sleep. 90 tablet 0    umeclidinium-vilanteroL (Anoro Ellipta) 62.5-25 mcg/actuation blister with device Inhale 1 puff once daily. 3 each 3    vitamin B complex (B Complex-Vitamin B12) tablet Take 1 tablet by mouth once daily.      zolpidem (Ambien) 5 mg tablet Take 1 tablet (5 mg)  by mouth as needed at bedtime for sleep for up to 14 days. 14 tablet 0    [DISCONTINUED] azithromycin (Zithromax) 250 mg tablet 2 tablets today, then 1 tablet for four days (Patient not taking: Reported on 9/28/2023) 6 tablet 0    [DISCONTINUED] escitalopram (Lexapro) 10 mg tablet Take 1 tablet (10 mg) by mouth once daily.      [DISCONTINUED] insulin lispro (HumaLOG) 100 unit/mL injection INJECT 5 UNIT 3 times daily with meals (Patient not taking: Reported on 9/28/2023) 15 mL 2    [DISCONTINUED] levoFLOXacin (Levaquin) 750 mg tablet Take 1 tablet (750 mg) by mouth once daily.      [DISCONTINUED] ostomy supplies (Coloplast Paste) paste 1 each 2 times a day as needed (itching). 71 g 2    [DISCONTINUED] SITagliptin phosphate (Januvia) 25 mg tablet Take 1 tablet (25 mg) by mouth once daily.      [DISCONTINUED] SITagliptin phosphate (Januvia) 25 mg tablet Take 1 tablet (25 mg) by mouth once daily. 30 tablet 0     No current facility-administered medications on file prior to visit.      Assessment/Plan   Problem List Items Addressed This Visit       COPD, severe (CMS/[facility])    Type 2 diabetes mellitus with stage 4 chronic kidney disease, without long-term current use of insulin (CMS/[facility])     Other Visit Diagnoses       Chronic atrial fibrillation (CMS/[facility])        Sleep disturbances              RECOMMENDATIONS/PLAN  1. Diabetes   - Continue: Humalog 8 units TID with meals, Jardiance 10 mg daily, and Januvia 25 mg daiy  -Will look into getting Januvia through [facility] along with patient's other medications  2. Insomnia   - Continue: Trazodone 100 mg daily  3. Edema:   - Continue: Bumex 2 mg daily and testing blood pressure daily. Advised to monitor edema/weight gain, and continue holding Lasix for now.      Clinical Pharmacist follow up: 10/27/2023 @ 4:00 pm @ [phone] (Kacie - patient's daughter).      Verbal consent to manage patient's drug therapy was obtained from the patient and an individual authorized to act on  behalf of a patient. They were informed they may decline to participate or withdraw from participation in pharmacy services at any time.    Cassandra Tim PharmD  Clinical Ambulatory Care Pharmacist  Ph: [phone]    Verbal consent to manage patient's drug therapy was obtained from the patient. They were informed they may decline to participate or withdraw from participation in pharmacy services at any time.

## 2023-10-09 NOTE — TELEPHONE ENCOUNTER
After seeing dr. Orellana about two weeks ago lissa legs are sill super swollen and blistered and the lasix isnt helping she just wanted to let dr. Orellana know because they dont know what to do. Please advise

## 2023-10-11 NOTE — PROGRESS NOTES
Unable to reach patient for call back after patient's follow up appointment with PCP.   YASMANYM with call back number for patient to call if needed   If no voicemail available call attempts x 2 were made to contact the patient to assist with any questions or concerns patient may have.

## 2023-10-19 NOTE — TELEPHONE ENCOUNTER
Rx Refill Request     Name: Magno Zimmer  :  1940   Medication Name:  Furosemide (Lasix) 20 mg Tablet  Specific Pharmacy location:  [facility]   Date of last appointment:  2023   Date of next appointment:  2024   Best number to reach patient:  [phone]         RX PENDED to [facility] in [place] as instructed by the patient.

## 2023-10-23 NOTE — TELEPHONE ENCOUNTER
Recent Visits  Date Type Provider Dept   09/28/23 Office Visit Jimi Orellana MD Do [id number] [facility]   08/03/23 Office Visit Jimi Orellana MD Do [id number] [facility]   05/03/23 Office Visit Jimi Orellana MD Do [id number] [facility]   04/04/23 Office Visit Jimi Orellana MD Do [id number] [facility]   Showing recent visits within past 540 days and meeting all other requirements  Future Appointments  Date Type Provider Dept   01/02/24 Appointment Jimi Orellana MD Do [id number] [facility]   Showing future appointments within next 180 days and meeting all other requirements

## 2023-10-26 NOTE — TELEPHONE ENCOUNTER
Rx Refill Request     Name: Magno Zimmer  :  1940   Medication Name:  Trazodone (Desyrel) 50 mg Tablet  Specific Pharmacy location:  [facility]   Date of last appointment:  2023   Date of next appointment:  2024   Best number to reach patient:  [phone]         RX PENDED w 90 day supply as requested to [facility]

## 2023-10-27 NOTE — PROGRESS NOTES
Subjective   Patient ID: Magno Zimmer is a 83 y.o. male who presents for Diabetes, COPD, and Congestive Heart Failure.    Referring Provider: Jimi Orellana MD     Spoke with patient's daughter, Kacie today. Since last visit, patient has been overall been feeling a lot better. His BG has continued to improve, now staying consistently below 200; usually between 130-200 throughout the day. Only uses Humalog before meals if BG is >200. Has felt that CGM has helped a lot with BG control.     Patient recently had follow-up visit with nephrology. They discontinued patient's Lasix and increased Bumex to 2 mg TID to help with swelling. Patient denies any shortness of breath and edema at the moment. BP well within normal ranges, though tends to be on the lower end.      Objective     There were no vitals taken for this visit.     Labs  Lab Results   Component Value Date    BILITOT 0.3 10/16/2023    CALCIUM 8.5 (L) 10/16/2023    CO2 29 10/16/2023     10/16/2023    CREATININE 1.49 (H) 10/16/2023    GLUCOSE 149 (H) 10/16/2023    ALKPHOS 91 10/16/2023    K 4.1 10/16/2023    PROT 5.8 (L) 10/16/2023     10/16/2023    AST 14 10/16/2023    ALT 11 10/16/2023    BUN 29 (H) 10/16/2023    ANIONGAP 11 10/16/2023    MG 2.63 (H) 09/17/2023    PHOS 4.0 09/17/2023    PHOS 3.9 09/17/2023    ALBUMIN 3.4 10/16/2023    LIPASE 33 09/12/2023    GFRMALE 30 (A) 09/17/2023    GFRMALE 30 (A) 09/17/2023     Lab Results   Component Value Date    TRIG 83 10/16/2023    CHOL 133 10/16/2023    LDLCALC 77 10/16/2023    HDL 39.0 10/16/2023     Lab Results   Component Value Date    HGBA1C 6.9 (H) 10/16/2023       Current Outpatient Medications on File Prior to Visit   Medication Sig Dispense Refill    Coloplast Paste paste 1 EACH 2 TIMES A DAY AS NEEDED (ITCHING). 71 g 2    acetaminophen (Tylenol) 500 mg tablet Take 2 tablets (1,000 mg) by mouth 3 times a day.      albuterol 90 mcg/actuation inhaler Inhale 2 puffs every 6 hours if needed  for wheezing.      allopurinol (Zyloprim) 300 mg tablet Take 1 tablet (300 mg) by mouth once daily. 90 tablet 3    apixaban (Eliquis) 2.5 mg tablet Take 1 tablet (2.5 mg) by mouth 2 times a day.      B complex-vitamin C-folic acid (Nephrocaps) 1 mg capsule Take 1 capsule by mouth once daily. 90 capsule 3    brimonidine (AlphaGAN P) 0.15 % ophthalmic solution Administer 1 drop into affected eye(s) 3 times a day.      bumetanide (Bumex) 2 mg tablet TAKE 1 TABLET BY MOUTH TWICE A  tablet 3    busPIRone (Buspar) 10 mg tablet Take 1 tablet (10 mg) by mouth in the morning and 1 tablet (10 mg) before bedtime. 180 tablet 3    cephalexin (Keflex) 500 mg capsule TAKE 1 CAPSULE THREE TIMES A DAY FOR 10 DAYS 30 capsule 35    cyanocobalamin (Vitamin B-12) 1,000 mcg tablet Take 1 tablet (1,000 mcg) by mouth once daily.      doxycycline (Monodox) 100 mg capsule Take 1 capsule (100 mg) by mouth 2 times a day. Take with at least 8 ounces (large glass) of water, do not lie down for 30 minutes after      empagliflozin (Jardiance) 25 mg Take 1 tablet (25 mg) by mouth once daily.      escitalopram (Lexapro) 10 mg tablet Take 1 tablet (10 mg) by mouth once daily. 90 tablet 3    finasteride (Proscar) 5 mg tablet Take 1 tablet (5 mg) by mouth once daily. 90 tablet 3    FreeStyle Lynda reader (FreeStyle Lynda 2 [place]) misc Use as instructed 1 each 0    FreeStyle Lynda sensor system (FreeStyle Lynda 2 Sensor) kit Use as instructed to test blood sugar three times daily and as needed 2 each 5    furosemide (Lasix) 20 mg tablet TAKE 1 TABLET BY MOUTH EVERY OTHER DAY 15 tablet 0    hydroCHLOROthiazide (HYDRODiuril) 25 mg tablet Take 1 tablet (25 mg) by mouth once daily. (Patient not taking: Reported on 9/29/2023) 90 tablet 3    ipratropium-albuteroL (Duo-Neb) 0.5-2.5 mg/3 mL nebulizer solution USE 1 VIAL VIA NEBULIZER EVERY 8 HOURS Strength: 0.5-2.5 mg/3 mL 180 mL 3    Januvia 25 mg tablet TAKE 1 TABLET BY MOUTH EVERY DAY 30 tablet 0     latanoprost (Xalatan) 0.005 % ophthalmic solution Administer 1 drop into affected eye(s) once daily.      montelukast (Singulair) 10 mg tablet Take 1 tablet (10 mg) by mouth once daily in the evening. 90 tablet 3    nebulizer accessories kit Product SKU: [id number] 3 kit 2    nystatin (Mycostatin) 100,000 unit/gram powder Apply topically 2 times a day. 300 g 3    pantoprazole (ProtoNix) 40 mg EC tablet Take 1 tablet (40 mg) by mouth once daily in the morning. Take before meals. (Patient taking differently: Take 1 tablet (40 mg) by mouth 2 times a day.) 90 tablet 3    potassium chloride CR 10 mEq ER tablet Take 1 tablet (10 mEq) by mouth once daily. 90 tablet 3    pravastatin (Pravachol) 40 mg tablet Take 1 tablet (40 mg) by mouth once daily. 90 tablet 3    SITagliptin phosphate (Januvia) 25 mg tablet Take 1 tablet (25 mg) by mouth once daily. 90 tablet 3    spironolactone (Aldactone) 50 mg tablet Take 1 tablet (50 mg) by mouth once daily. (Patient not taking: Reported on 9/29/2023) 90 tablet 3    terazosin (Hytrin) 10 mg capsule Take 1 capsule (10 mg) by mouth once daily at bedtime. 90 capsule 3    torsemide (Demadex) 100 mg tablet Take 1 tablet (100 mg) by mouth once daily. (Patient not taking: Reported on 9/29/2023) 90 tablet 3    traZODone (Desyrel) 50 mg tablet Take 1 tablet (50 mg) by mouth once daily at bedtime. 90 tablet 0    umeclidinium-vilanteroL (Anoro Ellipta) 62.5-25 mcg/actuation blister with device Inhale 1 puff once daily. 3 each 3    vitamin B complex (B Complex-Vitamin B12) tablet Take 1 tablet by mouth once daily.      zolpidem (Ambien) 5 mg tablet Take 1 tablet (5 mg) by mouth as needed at bedtime for sleep for up to 14 days. 14 tablet 0    [DISCONTINUED] traZODone (Desyrel) 50 mg tablet Take 1 tablet (50 mg) by mouth as needed at bedtime for sleep. 90 tablet 0    [DISCONTINUED] traZODone (Desyrel) 50 mg tablet TAKE 1 TABLET (50 MG) BY MOUTH AS NEEDED AT BEDTIME FOR SLEEP. 90 tablet 0      No current facility-administered medications on file prior to visit.        Assessment/Plan   Problem List Items Addressed This Visit             ICD-10-CM    COPD, severe (CMS/[facility]) J44.9    Primary insomnia F51.01    Type 2 diabetes mellitus with stage 4 chronic kidney disease, without long-term current use of insulin (CMS/[facility]) E11.22, N18.4    (HFpEF) heart failure with preserved ejection fraction (CMS/[facility]) - Primary I50.30     RECOMMENDATIONS/PLAN  1. Diabetes   - Patient's most recent A1c is well controlled, down to 6.9% as opposed to 7.8% in August  - Continue: Humalog 8 units TID with meals, Jardiance 10 mg daily, and Januvia 25 mg daily  - Will send scripts for Januvia & Jardiance to [facility] for cost assistance through [facility]    2. Insomnia   - Continue: Trazodone 100 mg daily  - Script was sent to patient's mail order pharmacy    3. Edema:   - Recently saw nephrology and seeing some improvement with swelling with increased Bumex dose  - Continue: Bumex 2 mg TID and testing blood pressure daily.   - Advised to monitor edema/weight gain    4. Afib:  -Continue: Eliquis 2.5 mg BID  -Will send script to [facility] for cost assistance through [facility]    Cassandra Tim, RhinaD  Clinical Ambulatory Care Pharmacist  Ph: [phone]    Continue all meds under the continuation of care with the referring provider and clinical pharmacy team.    Clinical Pharmacist follow up: 12/1/23 or sooner as needed based on clinical intervention  Type of Encounter:  Telephone    Verbal consent to manage patient's drug therapy was obtained from the patient. They were informed they may decline to participate or withdraw from participation in pharmacy services at any time.

## 2023-10-30 NOTE — TELEPHONE ENCOUNTER
From: Magno Zimmer  To: Jimi Orellana MD  Sent: 10/27/2023 5:38 PM EDT  Subject: Kidney Dr. Follow up    Dad went to see Dr. Maddie Whalen in Nephrology yesterday, but for some reason her information is not listed on his my chart.    Her visit summary is:  Blood Pressure today 127/60 Heart rate 75  Stop taking Furosemide.   Increase Bumex to 3 times a day.   Hold on taking other water retention medication such as spirolactone due to blood pressure on the lower side and we need blood pressure to be above 110 to achieve good diuresis.  Fluid restriction of 1.5 L day  Strict Low Sodium diet 2 g  Change antibiotics to doxycycline 100 mg twice daily for 7 days.    She gave him a paper prescription for Bumex but nothing for Doxycycline.    He still has some Bumex to increase to 3 times a day, but will need you to change his prescription in [facility].  Please prescribe the Doxycycline through [facility] and also through [facility] if there will be refills.  Thank you. Kacie (Daughter)

## 2023-11-01 NOTE — PROGRESS NOTES
Magno presents as a new patient for an evaluation.  The patient’s EMR has been reviewed.  Lives in [place], [place].    SUBJECTIVE: HPI   TODAY (11/01/23)  Patient states he is circumcised.   However, c/o urinary issues 2/2 excess penile skin.   States his urine gets trapped and has post-void dribbling.   Otherwise, no recent UTI's, gross hematuria, fevers or chills.   IPSS - 7.     PMH of DM, AFIB (on Eliquis), COPD, CHF, LE edema.   Past abdominal surgery for small bowel rupture, per patient.   Denies FH of  malignancy.  Non-smoker.     Past medical, surgical, family and social history in the chart was reviewed and is accurate including any additions to what is in this HPI.    Review of Systems   Constitutional: denies any unintentional weight loss or change in strength.  Integumentary: denies any rashes or pruritus.  Eyes: denies any double vision or eye pain.  Ear/Nose/Mouth/Throat: denies any nosebleeds or gum bleeds.  Cardiovascular: denies any chest pain or syncope.  Respiratory: denies hemoptysis.  Gastrointestinal: denies nausea or vomiting.  Musculoskeletal: denies muscle cramping or weakness.  Neurologic: denies convulsions or seizures.  Hematologic/Lymphatic: denies bleeding tendencies.  Endocrine: denies heat/cold intolerance.  All other systems have been reviewed and are negative unless otherwise noted in the HPI.    OBJECTIVE:  Visit Vitals  /64   Pulse 70     Physical Exam   Constitutional: No obvious distress.  Eyes: Non-injected conjunctiva, sclera clear, EOMI.  Ears/Nose/Mouth/Throat: No obvious drainage per ears or nose.  Cardiovascular: Extremities are warm and well perfused. No edema, cyanosis or pallor.  Respiratory: No audible wheezing/stridor; respirations do not appear labored.  Gastrointestinal: Abdomen soft, not distended.  Musculoskeletal: Normal ROM of extremities.  Skin: No obvious rashes or open sores.  Neurologic: Alert and oriented, CN 2-12 grossly intact.  Psychiatric: Answers  questions appropriately with normal affect.  Hematologic/Lymphatic/Immunologic: No obvious bruises or sites of spontaneous bleeding.  Genitourinary: No CVA tenderness, bladder not palpable.   On exam, was able to retract foreskin.   No significant phimotic band.     Labs and imaging:  Lab Results   Component Value Date    WBC 10.2 10/16/2023    HGB 8.8 (L) 10/16/2023    HCT 28.3 (L) 10/16/2023     10/16/2023    CHOL 133 10/16/2023    TRIG 83 10/16/2023    HDL 39.0 10/16/2023    ALT 11 10/16/2023    AST 14 10/16/2023     10/16/2023    K 4.1 10/16/2023     10/16/2023    CREATININE 1.49 (H) 10/16/2023    BUN 29 (H) 10/16/2023    CO2 29 10/16/2023    TSH 6.38 (H) 08/30/2023    INR 1.0 09/12/2023    HGBA1C 6.9 (H) 10/16/2023     ASSESSMENT:  Problem List Items Addressed This Visit       Phimosis    Relevant Orders    POCT UA Automated manually resulted (Completed)      PLAN:  On exam, was able to retract foreskin. No significant phimotic band.   Proper hygienic practices and advised to retract foreskin with voids and in shower  No evident urologic concerns at this time.   May FU with me PRN.     All questions were answered to the patient’s satisfaction.  Patient agrees with the plan and wishes to proceed.    Scribed for Dr. Isaac Martin by Nas Flynn.  I, Dr. Isaac Martin have personally reviewed and agreed with the information entered by the Virtual Scribe. 11/01/23.

## 2023-11-11 NOTE — PROGRESS NOTES
REASON FOR VISIT:  History of gastric outlet obstruction, anemia    HPI:  Magno Zimmer is a 83 y.o. male  PMH of morbid obesity, HTN, CAD, Lower extremity venous stasis, who presents for above disease. He was seen by me on 01/2023 for GOO, underwent EGD showed two large duodenal diverticula, causing mild narrowing, otherwise no acute abnormalities. 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 His Hg stable around 8.5. He does not have any symptomatic bleed. Denies decrease appetite, weight loss, abdominal pain, hematemesis, melena or rectal bleed. He is following up with nephrology for history of CKDIII-IV.           REVIEW OF SYSTEMS  Review of Systems   Constitutional: Negative.  Negative for activity change, appetite change, chills, fatigue, fever and unexpected weight change.   HENT: Negative.     Respiratory: Negative.     Cardiovascular: Negative.  Negative for chest pain and palpitations.   Gastrointestinal: Negative.  Negative for abdominal distention, abdominal pain, anal bleeding, blood in stool, constipation, diarrhea, nausea, rectal pain and vomiting.   Skin: Negative.  Negative for color change and rash.   Neurological: Negative.  Negative for dizziness, tremors, seizures, weakness and headaches.   Psychiatric/Behavioral: Negative.  Negative for confusion.       Allergies   Allergen Reactions    Heparin Unknown       Past Medical History:   Diagnosis Date    Acute gastric ulcer with hemorrhage 01/20/2022    Acute gastric ulcer with hemorrhage    Cellulitis of left leg 02/12/2023    Cellulitis of left lower extremity    Cellulitis of left lower limb     Cellulitis of left lower extremity    Gastrointestinal hemorrhage, unspecified 01/20/2022    GI bleed requiring more than 4 units of blood in 24 hours, ICU, or surgery    Personal history of other endocrine, nutritional and metabolic disease 09/24/2019    History of hyperglycemia    Small bowel perforation  (CMS/[facility]) 02/12/2023       Past Surgical History:   Procedure Laterality Date    OTHER SURGICAL HISTORY  12/17/2019    Abscess incision and drainage    OTHER SURGICAL HISTORY  05/05/2021    Eye surgery       No family history on file.    Social History     Tobacco Use    Smoking status: Never    Smokeless tobacco: Never   Substance Use Topics    Alcohol use: Never       Current Outpatient Medications   Medication Sig Dispense Refill    acetaminophen (Tylenol) 500 mg tablet Take 2 tablets (1,000 mg) by mouth 3 times a day.      albuterol 90 mcg/actuation inhaler Inhale 2 puffs every 6 hours if needed for wheezing.      allopurinol (Zyloprim) 300 mg tablet Take 1 tablet (300 mg) by mouth once daily. 90 tablet 3    apixaban (Eliquis) 2.5 mg tablet Take 1 tablet (2.5 mg) by mouth 2 times a day. 180 tablet 2    B complex-vitamin C-folic acid (Nephrocaps) 1 mg capsule Take 1 capsule by mouth once daily. 90 capsule 3    brimonidine (AlphaGAN P) 0.15 % ophthalmic solution Administer 1 drop into affected eye(s) 3 times a day.      bumetanide (Bumex) 2 mg tablet Take 3 tablets (6 mg) by mouth once daily. 270 tablet 3    busPIRone (Buspar) 10 mg tablet Take 1 tablet (10 mg) by mouth in the morning and 1 tablet (10 mg) before bedtime. 180 tablet 3    cephalexin (Keflex) 500 mg capsule TAKE 1 CAPSULE THREE TIMES A DAY FOR 10 DAYS 30 capsule 35    Coloplast Paste paste 1 EACH 2 TIMES A DAY AS NEEDED (ITCHING). 71 g 2    cyanocobalamin (Vitamin B-12) 1,000 mcg tablet Take 1 tablet (1,000 mcg) by mouth once daily.      doxycycline (Monodox) 100 mg capsule Take 1 capsule (100 mg) by mouth 2 times a day. Take with at least 8 ounces (large glass) of water, do not lie down for 30 minutes after      empagliflozin (Jardiance) 25 mg Take 1 tablet (25 mg) by mouth once daily. 90 tablet 2    escitalopram (Lexapro) 10 mg tablet Take 1 tablet (10 mg) by mouth once daily. 90 tablet 3    finasteride (Proscar) 5 mg tablet Take 1 tablet (5 mg) by  mouth once daily. 90 tablet 3    FreeStyle Lynda reader (FreeStyle Lynda 2 [place]) misc Use as instructed 1 each 0    FreeStyle Lynda sensor system (FreeStyle Lynda 2 Sensor) kit Use as instructed to test blood sugar three times daily and as needed 2 each 5    hydroCHLOROthiazide (HYDRODiuril) 25 mg tablet Take 1 tablet (25 mg) by mouth once daily. 90 tablet 3    ipratropium-albuteroL (Duo-Neb) 0.5-2.5 mg/3 mL nebulizer solution USE 1 VIAL VIA NEBULIZER EVERY 8 HOURS Strength: 0.5-2.5 mg/3 mL 180 mL 3    Januvia 25 mg tablet TAKE 1 TABLET BY MOUTH EVERY DAY 30 tablet 0    latanoprost (Xalatan) 0.005 % ophthalmic solution Administer 1 drop into affected eye(s) once daily.      montelukast (Singulair) 10 mg tablet Take 1 tablet (10 mg) by mouth once daily in the evening. 90 tablet 3    nebulizer accessories kit Product SKU: [id number] 3 kit 2    nystatin (Mycostatin) 100,000 unit/gram powder Apply topically 2 times a day. 300 g 3    potassium chloride CR 10 mEq ER tablet Take 1 tablet (10 mEq) by mouth once daily. 90 tablet 3    pravastatin (Pravachol) 40 mg tablet Take 1 tablet (40 mg) by mouth once daily. 90 tablet 3    SITagliptin phosphate (Januvia) 25 mg tablet Take 1 tablet (25 mg) by mouth once daily. 90 tablet 2    terazosin (Hytrin) 10 mg capsule Take 1 capsule (10 mg) by mouth once daily at bedtime. 90 capsule 3    traZODone (Desyrel) 50 mg tablet Take 1 tablet (50 mg) by mouth once daily at bedtime. 90 tablet 0    umeclidinium-vilanteroL (Anoro Ellipta) 62.5-25 mcg/actuation blister with device Inhale 1 puff once daily. 3 each 3    vitamin B complex (B Complex-Vitamin B12) tablet Take 1 tablet by mouth once daily.      zolpidem (Ambien) 5 mg tablet Take 1 tablet (5 mg) by mouth as needed at bedtime for sleep for up to 14 days. 14 tablet 0     No current facility-administered medications for this visit.       PHYSICAL EXAM:  /72 (BP Location: Right arm, Patient Position: Sitting, BP Cuff  "Size: Large adult)   Pulse 72   Temp 36.5 °C (97.7 °F) (Temporal)   Resp 20   Ht 1.753 m (5' 9\")   Wt 140 kg (308 lb 3.2 oz)   SpO2 92%   BMI 45.51 kg/m²    General appearance: No acute distress, cooperative.  Eyes: Nonicteric, no redness or proptosis  Ears, nose, mouth, and throat: Moist mucous membranes, tongue normal  Neck: Supple, no lymphadenopathy or thyromegaly  Lungs: Clear to auscultation bilaterally  CV: Regular rate and rhythm, no murmur; no pitting edema in the lower extremities  Abd: soft, non-tender; non-distended; normal active bowel sounds; no scars  Skin: No rashes or lesions; no liver stigmata  MSK: No deformities or joint edema/redness/tenderness  Neuro: Alert and oriented ×3, normal gait, non-focal no asterixis      Lab Results   Component Value Date    WBC 10.2 10/16/2023    HGB 8.8 (L) 10/16/2023    HCT 28.3 (L) 10/16/2023     (H) 10/16/2023     10/16/2023       Lab Results   Component Value Date    ALT 11 10/16/2023    AST 14 10/16/2023    ALKPHOS 91 10/16/2023    BILITOT 0.3 10/16/2023     Lab Results   Component Value Date    INR 1.0 09/12/2023    INR 1.0 09/06/2023    INR 1.1 08/30/2023    PROTIME 11.5 09/12/2023    PROTIME 11.2 09/06/2023    PROTIME 12.7 08/30/2023       Lab Results   Component Value Date    IRON 48 09/05/2022    TIBC 215 (L) 09/05/2022    FERRITIN 1,036 (H) 09/04/2022          ASSESSMENT&Plan:  Hx of anemia, Previous history of gastric outlet obstruction. Most likely multifactorial. CORDELIA, anemia of chronic disease. Hg stable.  Patient does not have any nausea or vomiting, able to eat. Daily BM  Continue monitoring Hg and iron panel  No needs for endoscopic intervention at this time  Follow up as needed    Hx of morbid obesity  Advise about weight loss by diet and exercise    HTN, CKD, Afib, CAD  Following up with cardiology , PCP  Continue home meds    Consultation requested by Dr. Jimi Orellana MD.   My final recommendations will be communicated " back to the requesting physician by way of shared Medical record or letter to requesting physician via fax.          Signature: Catherine Whalen MD    Date: 11/11/2023  Time: 9:14 AM

## 2023-12-01 ENCOUNTER — APPOINTMENT (OUTPATIENT)
Dept: PHARMACY | Facility: HOSPITAL | Age: 83
End: 2023-12-01
Payer: MEDICARE

## 2024-01-02 ENCOUNTER — APPOINTMENT (OUTPATIENT)
Dept: PRIMARY CARE | Facility: CLINIC | Age: 84
End: 2024-01-02
Payer: MEDICARE

## 2024-01-03 ENCOUNTER — APPOINTMENT (OUTPATIENT)
Dept: NEUROLOGY | Facility: CLINIC | Age: 84
End: 2024-01-03
Payer: MEDICARE

## 2024-01-18 ENCOUNTER — APPOINTMENT (OUTPATIENT)
Dept: PRIMARY CARE | Facility: CLINIC | Age: 84
End: 2024-01-18
Payer: MEDICARE

## 2024-01-25 DIAGNOSIS — G47.9 SLEEP DISTURBANCES: ICD-10-CM

## 2024-01-25 RX ORDER — TRAZODONE HYDROCHLORIDE 50 MG/1
50 TABLET ORAL NIGHTLY PRN
Qty: 90 TABLET | Refills: 0 | OUTPATIENT
Start: 2024-01-25

## 2024-01-29 ENCOUNTER — APPOINTMENT (OUTPATIENT)
Dept: PRIMARY CARE | Facility: CLINIC | Age: 84
End: 2024-01-29
Payer: MEDICARE